# Patient Record
Sex: MALE | Race: WHITE | NOT HISPANIC OR LATINO | Employment: OTHER | URBAN - METROPOLITAN AREA
[De-identification: names, ages, dates, MRNs, and addresses within clinical notes are randomized per-mention and may not be internally consistent; named-entity substitution may affect disease eponyms.]

---

## 2017-02-03 ENCOUNTER — OFFICE VISIT (OUTPATIENT)
Dept: FAMILY MEDICINE | Facility: CLINIC | Age: 67
End: 2017-02-03
Payer: COMMERCIAL

## 2017-02-03 VITALS
BODY MASS INDEX: 35.81 KG/M2 | DIASTOLIC BLOOD PRESSURE: 72 MMHG | SYSTOLIC BLOOD PRESSURE: 130 MMHG | WEIGHT: 194.6 LBS | HEIGHT: 62 IN | TEMPERATURE: 97.1 F | HEART RATE: 72 BPM

## 2017-02-03 DIAGNOSIS — I10 BENIGN ESSENTIAL HYPERTENSION: Primary | ICD-10-CM

## 2017-02-03 DIAGNOSIS — R73.9 HYPERGLYCEMIA: ICD-10-CM

## 2017-02-03 DIAGNOSIS — R73.03 PRE-DIABETES: ICD-10-CM

## 2017-02-03 DIAGNOSIS — Z13.6 SCREENING FOR AAA (AORTIC ABDOMINAL ANEURYSM): ICD-10-CM

## 2017-02-03 LAB
ANION GAP SERPL CALCULATED.3IONS-SCNC: 5 MMOL/L (ref 3–14)
BUN SERPL-MCNC: 21 MG/DL (ref 7–30)
CALCIUM SERPL-MCNC: 9.5 MG/DL (ref 8.5–10.1)
CHLORIDE SERPL-SCNC: 102 MMOL/L (ref 94–109)
CO2 SERPL-SCNC: 30 MMOL/L (ref 20–32)
CREAT SERPL-MCNC: 0.94 MG/DL (ref 0.66–1.25)
GFR SERPL CREATININE-BSD FRML MDRD: 79 ML/MIN/1.7M2
GLUCOSE SERPL-MCNC: 130 MG/DL (ref 70–99)
HBA1C MFR BLD: 5.7 % (ref 4.3–6)
POTASSIUM SERPL-SCNC: 4.7 MMOL/L (ref 3.4–5.3)
SODIUM SERPL-SCNC: 137 MMOL/L (ref 133–144)

## 2017-02-03 PROCEDURE — 80048 BASIC METABOLIC PNL TOTAL CA: CPT | Performed by: FAMILY MEDICINE

## 2017-02-03 PROCEDURE — 36415 COLL VENOUS BLD VENIPUNCTURE: CPT | Performed by: FAMILY MEDICINE

## 2017-02-03 PROCEDURE — 99214 OFFICE O/P EST MOD 30 MIN: CPT | Performed by: FAMILY MEDICINE

## 2017-02-03 PROCEDURE — 83036 HEMOGLOBIN GLYCOSYLATED A1C: CPT | Performed by: FAMILY MEDICINE

## 2017-02-03 NOTE — PROGRESS NOTES
"  SUBJECTIVE:                                                    Bruno Baig is a 66 year old male who presents to clinic today for the following health issues:      Pre-Diabetes Follow-up      Patient is checking blood sugars: not at all    Diabetic concerns: None     Symptoms of hypoglycemia (low blood sugar): none     Paresthesias (numbness or burning in feet) or sores: No     Date of last diabetic eye exam: up to date     Hypertension Follow-up      Outpatient blood pressures are not being checked.    Low Salt Diet: no added salt  Patient presents for evaluation of hypertension.  He indicates that he is feeling well and denies any symptoms referable to his elevated blood pressure. Specifically denies chest pain, palpitations, dyspnea, orthopnea, PND or peripheral edema. Current medication regimen is as listed below. Patient denies any side effects of medication.         Amount of exercise or physical activity: None    Problems taking medications regularly: No    Medication side effects: none    Diet: low salt        Problem list and histories reviewed & adjusted, as indicated.  Additional history: as documented    Problem list, Medication list, Allergies, and Medical/Social/Surgical histories reviewed in EPIC and updated as appropriate.    ROS:  Constitutional, HEENT, cardiovascular, pulmonary, gi and gu systems are negative, except as otherwise noted.    OBJECTIVE:                                                    /72 mmHg  Pulse 72  Temp(Src) 97.1  F (36.2  C) (Tympanic)  Ht 5' 2\" (1.575 m)  Wt 194 lb 9.6 oz (88.27 kg)  BMI 35.58 kg/m2  Body mass index is 35.58 kg/(m^2).  GENERAL APPEARANCE: healthy, alert and no distress  RESP: lungs clear to auscultation - no rales, rhonchi or wheezes  CV: regular rates and rhythm, normal S1 S2, no S3 or S4 and no murmur, click or rub  SKIN: no suspicious lesions or rashes  PSYCH: mentation appears normal and affect normal/bright         ASSESSMENT/PLAN:      "                                               1. Benign essential hypertension  Stable no change in treatment plan.   - Basic metabolic panel    2. Pre-diabetes    3. Hyperglycemia  Has not really made any meaningful lifestyle changes   - Hemoglobin A1c    4. Screening for AAA (aortic abdominal aneurysm)    - US abdominal aorta limited; Future      Patient Instructions   Stay on all medications as you are taking them     Check Blood sugar 1-2 times per week 2 hours after your biggest meal <140 is goal     Labs today     Recheck in 6 months       Risks, benefits, side effects and rationale for treatment plan fully discussed with the patient and understanding expressed.     Charisse Lopez MD  [facility]

## 2017-02-03 NOTE — MR AVS SNAPSHOT
After Visit Summary   2/3/2017    Bruno Baig    MRN: [id number]           Patient Information     Date Of Birth          1950        Visit Information        Provider Department      2/3/2017 10:40 AM Charisse Lopez MD [facility]        Today's Diagnoses     Benign essential hypertension    -  1     Pre-diabetes         Hyperglycemia         Screening for AAA (aortic abdominal aneurysm)           Care Instructions    Stay on all medications as you are taking them     Check Blood sugar 1-2 times per week 2 hours after your biggest meal <140 is goal     Labs today     Recheck in 6 months         Follow-ups after your visit        Your next 10 appointments already scheduled     Feb 03, 2017 10:40 AM   Office Visit with Charisse Lopez MD   [facility]    [address] [place] [address] [phone]           Bring a current list of meds and any records pertaining to this visit.  For Physicals, please bring immunization records and any forms needing to be filled out.  Please arrive 10 minutes early to complete paperwork.              Future tests that were ordered for you today     Open Future Orders        Priority Expected Expires Ordered    US abdominal aorta limited Routine  2/3/2018 2/3/2017            Who to contact     If you have questions or need follow up information about today's clinic visit or your schedule please contact [facility] directly at [phone].  Normal or non-critical lab and imaging results will be communicated to you by MyChart, letter or phone within 4 business days after the clinic has received the results. If you do not hear from us within 7 days, please contact the clinic through MyChart or phone. If you have a critical or abnormal lab result, we will notify you by phone as soon as possible.  Submit refill requests through [employer] or call your  "pharmacy and they will forward the refill request to us. Please allow 3 business days for your refill to be completed.          Additional Information About Your Visit        MyChart Information     [employer] lets you send messages to your doctor, view your test results, renew your prescriptions, schedule appointments and more. To sign up, go to [email or web address] . Click on \"Log in\" on the left side of the screen, which will take you to the Welcome page. Then click on \"Sign up Now\" on the right side of the page.     You will be asked to enter the access code listed below, as well as some personal information. Please follow the directions to create your username and password.     Your access code is: [email or web address]  Expires: 2017 10:35 AM     Your access code will  in 90 days. If you need help or a new code, please call your [facility] clinic or [phone].        Care EveryWhere ID     This is your [facility] EveryWhere ID. This could be used by other organizations to access your [facility] medical records  [id number]        Your Vitals Were     Pulse Temperature Height BMI (Body Mass Index)          72 97.1  F (36.2  C) (Tympanic) 5' 2\" (1.575 m) 35.58 kg/m2         Blood Pressure from Last 3 Encounters:   17 130/72   10/18/16 138/60   10/04/16 142/60    Weight from Last 3 Encounters:   17 194 lb 9.6 oz (88.27 kg)   10/18/16 194 lb 9.6 oz (88.27 kg)   10/04/16 193 lb 3.2 oz (87.635 kg)              We Performed the Following     Basic metabolic panel     Hemoglobin A1c        Primary Care Provider Office Phone # Fax #    Charisse Lopez [phone]       [facility] [address] [address]        Thank you!     Thank you for choosing [facility]  for your care. Our goal is always to provide you with excellent care. Hearing back from our patients is one way we can continue to improve our services. Please take a few minutes to " complete the written survey that you may receive in the mail after your visit with us. Thank you!             Your Updated Medication List - Protect others around you: Learn how to safely use, store and throw away your medicines at www.disposemymeds.org.          This list is accurate as of: 2/3/17 10:35 AM.  Always use your most recent med list.                   Brand Name Dispense Instructions for use    aspirin 81 MG tablet      Take by mouth daily       blood glucose monitoring meter device kit    no brand specified    1 kit    1 kit by In Vitro route 2 times daily Use to test blood sugar 2 times daily or as directed.       blood glucose monitoring test strip    no brand specified    1 Box    Use to test blood sugars 2 times daily or as directed       fish oil-omega-3 fatty acids 1000 MG capsule      Take 2 g by mouth daily       glucosamine-chondroitin 500-400 MG Caps per capsule      Take 2 capsules by mouth daily       losartan-hydrochlorothiazide 100-12.5 MG per tablet    HYZAAR    90 tablet    Take 1 tablet by mouth daily       Vitamin D-3 1000 UNITS Caps

## 2017-02-03 NOTE — PATIENT INSTRUCTIONS
Stay on all medications as you are taking them     Check Blood sugar 1-2 times per week 2 hours after your biggest meal <140 is goal     Labs today     Recheck in 6 months

## 2017-02-03 NOTE — NURSING NOTE
"Chief Complaint   Patient presents with     Diabetes     pre diabetes       Initial /78 mmHg  Pulse 72  Temp(Src) 97.1  F (36.2  C) (Tympanic)  Ht 5' 2\" (1.575 m)  Wt 194 lb 9.6 oz (88.27 kg)  BMI 35.58 kg/m2 Estimated body mass index is 35.58 kg/(m^2) as calculated from the following:    Height as of this encounter: 5' 2\" (1.575 m).    Weight as of this encounter: 194 lb 9.6 oz (88.27 kg).  BP completed using cuff size: large    "

## 2017-02-27 ENCOUNTER — HOSPITAL ENCOUNTER (OUTPATIENT)
Dept: ULTRASOUND IMAGING | Facility: CLINIC | Age: 67
Discharge: HOME | End: 2017-02-27
Attending: FAMILY MEDICINE | Admitting: FAMILY MEDICINE
Payer: MEDICARE

## 2017-02-27 DIAGNOSIS — Z13.6 SCREENING FOR AAA (AORTIC ABDOMINAL ANEURYSM): ICD-10-CM

## 2017-02-27 PROCEDURE — 76775 US EXAM ABDO BACK WALL LIM: CPT

## 2017-03-10 PROBLEM — I71.40 ABDOMINAL AORTIC ANEURYSM (AAA) WITHOUT RUPTURE (H): Status: ACTIVE | Noted: 2017-03-10

## 2017-07-18 ENCOUNTER — TELEPHONE (OUTPATIENT)
Dept: FAMILY MEDICINE | Facility: CLINIC | Age: 67
End: 2017-07-18

## 2017-07-24 ENCOUNTER — OFFICE VISIT (OUTPATIENT)
Dept: FAMILY MEDICINE | Facility: CLINIC | Age: 67
End: 2017-07-24
Payer: COMMERCIAL

## 2017-07-24 VITALS
SYSTOLIC BLOOD PRESSURE: 130 MMHG | HEIGHT: 62 IN | TEMPERATURE: 97.4 F | WEIGHT: 190 LBS | BODY MASS INDEX: 34.96 KG/M2 | DIASTOLIC BLOOD PRESSURE: 70 MMHG | HEART RATE: 72 BPM

## 2017-07-24 DIAGNOSIS — E78.5 HYPERLIPIDEMIA LDL GOAL <100: ICD-10-CM

## 2017-07-24 DIAGNOSIS — I10 BENIGN ESSENTIAL HYPERTENSION: Primary | ICD-10-CM

## 2017-07-24 DIAGNOSIS — Z71.89 ADVANCED DIRECTIVES, COUNSELING/DISCUSSION: ICD-10-CM

## 2017-07-24 DIAGNOSIS — I71.40 ABDOMINAL AORTIC ANEURYSM (AAA) WITHOUT RUPTURE (H): ICD-10-CM

## 2017-07-24 DIAGNOSIS — Z12.11 SPECIAL SCREENING FOR MALIGNANT NEOPLASMS, COLON: ICD-10-CM

## 2017-07-24 DIAGNOSIS — R73.03 PRE-DIABETES: ICD-10-CM

## 2017-07-24 DIAGNOSIS — Z72.0 TOBACCO ABUSE: ICD-10-CM

## 2017-07-24 PROBLEM — Z28.21 REFUSES TETANUS, DIPHTHERIA, AND ACELLULAR PERTUSSIS (TDAP) VACCINATION: Status: ACTIVE | Noted: 2017-07-24

## 2017-07-24 PROBLEM — Z28.21 PNEUMOCOCCAL VACCINATION DECLINED BY PATIENT: Status: ACTIVE | Noted: 2017-07-24

## 2017-07-24 LAB
ALBUMIN SERPL-MCNC: 3.9 G/DL (ref 3.4–5)
ALP SERPL-CCNC: 61 U/L (ref 40–150)
ALT SERPL W P-5'-P-CCNC: 29 U/L (ref 0–70)
ANION GAP SERPL CALCULATED.3IONS-SCNC: 11 MMOL/L (ref 3–14)
AST SERPL W P-5'-P-CCNC: 31 U/L (ref 0–45)
BASOPHILS # BLD AUTO: 0 10E9/L (ref 0–0.2)
BASOPHILS NFR BLD AUTO: 0.4 %
BILIRUB DIRECT SERPL-MCNC: <0.1 MG/DL (ref 0–0.2)
BILIRUB SERPL-MCNC: 0.2 MG/DL (ref 0.2–1.3)
BUN SERPL-MCNC: 25 MG/DL (ref 7–30)
CALCIUM SERPL-MCNC: 9.6 MG/DL (ref 8.5–10.1)
CHLORIDE SERPL-SCNC: 104 MMOL/L (ref 94–109)
CHOLEST SERPL-MCNC: 225 MG/DL
CO2 SERPL-SCNC: 23 MMOL/L (ref 20–32)
CREAT SERPL-MCNC: 1.14 MG/DL (ref 0.66–1.25)
DIFFERENTIAL METHOD BLD: ABNORMAL
EOSINOPHIL # BLD AUTO: 0.3 10E9/L (ref 0–0.7)
EOSINOPHIL NFR BLD AUTO: 4.8 %
ERYTHROCYTE [DISTWIDTH] IN BLOOD BY AUTOMATED COUNT: 11.7 % (ref 10–15)
GFR SERPL CREATININE-BSD FRML MDRD: 64 ML/MIN/1.7M2
GLUCOSE SERPL-MCNC: 141 MG/DL (ref 70–99)
HBA1C MFR BLD: 5.6 % (ref 4.3–6)
HCT VFR BLD AUTO: 41.6 % (ref 40–53)
HDLC SERPL-MCNC: 28 MG/DL
HGB BLD-MCNC: 14.7 G/DL (ref 13.3–17.7)
LDLC SERPL CALC-MCNC: ABNORMAL MG/DL
LDLC SERPL DIRECT ASSAY-MCNC: 79 MG/DL
LYMPHOCYTES # BLD AUTO: 2 10E9/L (ref 0.8–5.3)
LYMPHOCYTES NFR BLD AUTO: 29.9 %
MCH RBC QN AUTO: 33.4 PG (ref 26.5–33)
MCHC RBC AUTO-ENTMCNC: 35.3 G/DL (ref 31.5–36.5)
MCV RBC AUTO: 95 FL (ref 78–100)
MONOCYTES # BLD AUTO: 0.8 10E9/L (ref 0–1.3)
MONOCYTES NFR BLD AUTO: 11.7 %
NEUTROPHILS # BLD AUTO: 3.6 10E9/L (ref 1.6–8.3)
NEUTROPHILS NFR BLD AUTO: 53.2 %
NONHDLC SERPL-MCNC: 197 MG/DL
PLATELET # BLD AUTO: 242 10E9/L (ref 150–450)
POTASSIUM SERPL-SCNC: 4.6 MMOL/L (ref 3.4–5.3)
PROT SERPL-MCNC: 7.8 G/DL (ref 6.8–8.8)
RBC # BLD AUTO: 4.4 10E12/L (ref 4.4–5.9)
SODIUM SERPL-SCNC: 138 MMOL/L (ref 133–144)
TRIGL SERPL-MCNC: 1404 MG/DL
TSH SERPL DL<=0.005 MIU/L-ACNC: 1.66 MU/L (ref 0.4–4)
WBC # BLD AUTO: 6.7 10E9/L (ref 4–11)

## 2017-07-24 PROCEDURE — 99214 OFFICE O/P EST MOD 30 MIN: CPT | Performed by: FAMILY MEDICINE

## 2017-07-24 PROCEDURE — 83036 HEMOGLOBIN GLYCOSYLATED A1C: CPT | Performed by: FAMILY MEDICINE

## 2017-07-24 PROCEDURE — 83721 ASSAY OF BLOOD LIPOPROTEIN: CPT | Mod: 59 | Performed by: FAMILY MEDICINE

## 2017-07-24 PROCEDURE — 80076 HEPATIC FUNCTION PANEL: CPT | Performed by: FAMILY MEDICINE

## 2017-07-24 PROCEDURE — 80061 LIPID PANEL: CPT | Performed by: FAMILY MEDICINE

## 2017-07-24 PROCEDURE — 84443 ASSAY THYROID STIM HORMONE: CPT | Performed by: FAMILY MEDICINE

## 2017-07-24 PROCEDURE — 85025 COMPLETE CBC W/AUTO DIFF WBC: CPT | Performed by: FAMILY MEDICINE

## 2017-07-24 PROCEDURE — 36415 COLL VENOUS BLD VENIPUNCTURE: CPT | Performed by: FAMILY MEDICINE

## 2017-07-24 PROCEDURE — 80048 BASIC METABOLIC PNL TOTAL CA: CPT | Performed by: FAMILY MEDICINE

## 2017-07-24 NOTE — PROGRESS NOTES
SUBJECTIVE:                                                    Bruno Baig is a 66 year old male who presents to clinic today for the following health issues:    Pre -Diabetes Follow-up      Patient is checking blood sugars: once a week, ranges from 125-155    Diabetic concerns: None     Symptoms of hypoglycemia (low blood sugar): none     Paresthesias (numbness or burning in feet) or sores: No     Date of last diabetic eye exam: about one year ago    Hypertension Follow-up      Outpatient blood pressures are not being checked.    Low Salt Diet: not monitoring salt  Patient presents for evaluation of hypertension.  He indicates that he is feeling well and denies any symptoms referable to his elevated blood pressure. Specifically denies chest pain, palpitations, dyspnea, orthopnea, PND or peripheral edema. Current medication regimen is as listed below. Patient denies any side effects of medication.              Amount of exercise or physical activity: a couple days a week- yardwork, stretching exercises    Problems taking medications regularly: No    Medication side effects: none  Diet: regular (no restrictions)      Hyperlipidemia Follow-Up      Rate your low fat/cholesterol diet?: good    Taking statin?  Yes, no muscle aches from statin    Other lipid medications/supplements?:  none    Tobacco abuse  Not ready/willing to quit has in the past and just is not wanting to now    AAA on screening US this winter plan to repeat in Sept to assess         Problem list and histories reviewed & adjusted, as indicated.  Additional history: as documented    Labs reviewed in EPIC    Reviewed and updated as needed this visit by clinical staffTobacco  Allergies  Meds  Problems  Med Hx  Surg Hx  Fam Hx  Soc Hx        Reviewed and updated as needed this visit by Provider  Allergies  Meds  Problems         ROS:  Constitutional, HEENT, cardiovascular, pulmonary, gi and gu systems are negative, except as otherwise  "noted.      OBJECTIVE:                                                    /70  Pulse 72  Temp 97.4  F (36.3  C) (Tympanic)  Ht 5' 2\" (1.575 m)  Wt 190 lb (86.2 kg)  BMI 34.75 kg/m2  Body mass index is 34.75 kg/(m^2).  GENERAL APPEARANCE: healthy, alert and no distress  NECK: no adenopathy, no asymmetry, masses, or scars and thyroid normal to palpation  RESP: lungs clear to auscultation - no rales, rhonchi or wheezes  CV: regular rates and rhythm, normal S1 S2, no S3 or S4 and no murmur, click or rub  MS: extremities normal- no gross deformities noted  PSYCH: mentation appears normal and affect normal/bright         ASSESSMENT/PLAN:                                                    1. Benign essential hypertension  Stable no change in treatment plan.   - Lipid panel reflex to direct LDL  - Basic metabolic panel  - CBC with platelets differential  - Hepatic panel  - TSH with free T4 reflex    2. Pre-diabetes    - Hemoglobin A1c    3. Abdominal aortic aneurysm (AAA) without rupture (H)  Will recheck in Sept   -  abdominal aorta limited; Future    4. Hyperlipidemia LDL goal <100  Adjust meds as indicated by above labs.     5. Special screening for malignant neoplasms, colon    - GASTROENTEROLOGY ADULT REF PROCEDURE ONLY    6. Advanced directives, counseling/discussion      7. Tobacco abuse  Not wanting to quit now offered help       Patient Instructions   Labs today     Set up US in September     Set up colonoscopy     See me in 6 months for recheck of BP     Continue to check BS a couple of times a week and if >170 let me know     Risks, benefits, side effects and rationale for treatment plan fully discussed with the patient and understanding expressed.   Charisse Lopez MD  [facility]  "

## 2017-07-24 NOTE — MR AVS SNAPSHOT
After Visit Summary   7/24/2017    Bruno Baig    MRN: [id number]           Patient Information     Date Of Birth          1950        Visit Information        Provider Department      7/24/2017 10:00 AM Charisse Lopez MD [facility]        Today's Diagnoses     Benign essential hypertension    -  1    Pre-diabetes        Abdominal aortic aneurysm (AAA) without rupture (H)        Hyperlipidemia LDL goal <100        Special screening for malignant neoplasms, colon        Advanced directives, counseling/discussion          Care Instructions    Labs today     Set up US in September     Set up colonoscopy     See me in 6 months for recheck of BP     Continue to check BS a couple of times a week and if >170 let me know           Follow-ups after your visit        Additional Services     GASTROENTEROLOGY ADULT REF PROCEDURE ONLY       Last Lab Result: Creatinine (mg/dL)       Date                     Value                 02/03/2017               0.94             ----------  Body mass index is 34.75 kg/(m^2).      Patient will be contacted to schedule procedure.     Please be aware that coverage of these services is subject to the terms and limitations of your health insurance plan.  Call member services at your health plan with any benefit or coverage questions.  Any procedures must be performed at a [facility] facility OR coordinated by your clinic's referral office.    Please bring the following with you to your appointment:    (1) Any X-Rays, CTs or MRIs which have been performed.  Contact the facility where they were done to arrange for  prior to your scheduled appointment.    (2) List of current medications   (3) This referral request   (4) Any documents/labs given to you for this referral                  Future tests that were ordered for you today     Open Future Orders        Priority Expected Expires Ordered    US abdominal aorta  "limited Routine  7/24/2018 7/24/2017            Who to contact     If you have questions or need follow up information about today's clinic visit or your schedule please contact [facility] directly at [phone].  Normal or non-critical lab and imaging results will be communicated to you by [employer], letter or phone within 4 business days after the clinic has received the results. If you do not hear from us within 7 days, please contact the clinic through [employer] or phone. If you have a critical or abnormal lab result, we will notify you by phone as soon as possible.  Submit refill requests through [employer] or call your pharmacy and they will forward the refill request to us. Please allow 3 business days for your refill to be completed.          Additional Information About Your Visit        [employer] Information     [employer] gives you secure access to your electronic health record. If you see a primary care provider, you can also send messages to your care team and make appointments. If you have questions, please call your primary care clinic.  If you do not have a primary care provider, please call [phone] and they will assist you.        Care EveryWhere ID     This is your Care EveryWhere ID. This could be used by other organizations to access your [facility] medical records  [id number]        Your Vitals Were     Pulse Temperature Height BMI (Body Mass Index)          72 97.4  F (36.3  C) (Tympanic) 5' 2\" (1.575 m) 34.75 kg/m2         Blood Pressure from Last 3 Encounters:   07/24/17 144/72   02/03/17 130/72   10/18/16 138/60    Weight from Last 3 Encounters:   07/24/17 190 lb (86.2 kg)   02/03/17 194 lb 9.6 oz (88.3 kg)   10/18/16 194 lb 9.6 oz (88.3 kg)              We Performed the Following     Basic metabolic panel     CBC with platelets differential     GASTROENTEROLOGY ADULT REF PROCEDURE ONLY     Hemoglobin A1c     Hepatic panel     Lipid panel reflex to direct LDL     TSH with free " T4 reflex        Primary Care Provider Office Phone # Fax #    Charisse Lopez [phone]       [facility] [address] [facility] [address]        Equal Access to Services     TIFFANI REED : Brayan aida atkinson diane Soshey, waaxda luqadaha, qaybta kaalmada adeegyada, handy lopez laDimaaleena gallardo. So [facility] [phone].    ATENCIÓN: Si habla español, tiene a mcintyre disposición servicios gratuitos de asistencia lingüística. Llame al [phone].    We comply with applicable federal civil rights laws and [place] laws. We do not discriminate on the basis of race, color, national origin, age, disability sex, sexual orientation or gender identity.            Thank you!     Thank you for choosing [facility]  for your care. Our goal is always to provide you with excellent care. Hearing back from our patients is one way we can continue to improve our services. Please take a few minutes to complete the written survey that you may receive in the mail after your visit with us. Thank you!             Your Updated Medication List - Protect others around you: Learn how to safely use, store and throw away your medicines at www.disposemymeds.org.          This list is accurate as of: 7/24/17 10:26 AM.  Always use your most recent med list.                   Brand Name Dispense Instructions for use Diagnosis    aspirin 81 MG tablet      Take by mouth daily        blood glucose monitoring meter device kit    no brand specified    1 kit    1 kit by In Vitro route 2 times daily Use to test blood sugar 2 times daily or as directed.    Prediabetes       blood glucose monitoring test strip    no brand specified    1 Box    Use to test blood sugars 2 times daily or as directed    Prediabetes       fish oil-omega-3 fatty acids 1000 MG capsule      Take 2 g by mouth daily        glucosamine-chondroitin 500-400 MG Caps per capsule      Take 2 capsules by mouth daily         losartan-hydrochlorothiazide 100-12.5 MG per tablet    HYZAAR    90 tablet    Take 1 tablet by mouth daily    Benign essential hypertension       Vitamin D-3 1000 UNITS Caps

## 2017-07-24 NOTE — NURSING NOTE
"Chief Complaint   Patient presents with     Chronic Disease Management       Initial /72 (BP Location: Right arm, Cuff Size: Adult Large)  Pulse 72  Temp 97.4  F (36.3  C) (Tympanic)  Ht 5' 2\" (1.575 m)  Wt 190 lb (86.2 kg)  BMI 34.75 kg/m2 Estimated body mass index is 34.75 kg/(m^2) as calculated from the following:    Height as of this encounter: 5' 2\" (1.575 m).    Weight as of this encounter: 190 lb (86.2 kg).  Medication Reconciliation: complete    Health Maintenance that is potentially due pending provider review:  Colon cancer screening, Tdap, pneumonia vaccines    Possibly completing today per provider review.    Is there anyone who you would like to be able to receive your results? No  If yes have patient fill out RADHA    Radha Corona MA     "

## 2017-07-24 NOTE — PATIENT INSTRUCTIONS
Labs today     Set up US in September     Set up colonoscopy     See me in 6 months for recheck of BP     Continue to check BS a couple of times a week and if >170 let me know

## 2017-07-27 ENCOUNTER — TELEPHONE (OUTPATIENT)
Dept: FAMILY MEDICINE | Facility: CLINIC | Age: 67
End: 2017-07-27

## 2017-07-27 DIAGNOSIS — E78.5 HYPERLIPIDEMIA LDL GOAL <100: Primary | ICD-10-CM

## 2017-07-27 NOTE — TELEPHONE ENCOUNTER
Please call pt and verify that this was fasting. His TG are stacey high and this is mainly reflective of diet and activity. High tg put him at significant risk of pancreatitis and DM worsening as well as stroke and heart attacks. He must cut down on alcohol consumption this will help.  If yes this was fasting then we need to start a statin med lipitor 20 mg daily and repeat FLP in 2 months. If no then we need to repeat flp.     Patient notified and states understands results and advice. Patient states he was not fasting.  Orders in epic.  Malgorzata Chamorro CMA

## 2017-08-01 DIAGNOSIS — E78.5 HYPERLIPIDEMIA LDL GOAL <100: ICD-10-CM

## 2017-08-01 LAB
CHOLEST SERPL-MCNC: 222 MG/DL
HDLC SERPL-MCNC: 33 MG/DL
LDLC SERPL CALC-MCNC: ABNORMAL MG/DL
LDLC SERPL DIRECT ASSAY-MCNC: 100 MG/DL
NONHDLC SERPL-MCNC: 189 MG/DL
TRIGL SERPL-MCNC: 823 MG/DL

## 2017-08-01 PROCEDURE — 83721 ASSAY OF BLOOD LIPOPROTEIN: CPT | Mod: 59 | Performed by: FAMILY MEDICINE

## 2017-08-01 PROCEDURE — 36415 COLL VENOUS BLD VENIPUNCTURE: CPT | Performed by: FAMILY MEDICINE

## 2017-08-01 PROCEDURE — 80061 LIPID PANEL: CPT | Performed by: FAMILY MEDICINE

## 2017-08-07 ENCOUNTER — TELEPHONE (OUTPATIENT)
Dept: FAMILY MEDICINE | Facility: CLINIC | Age: 67
End: 2017-08-07

## 2017-08-07 DIAGNOSIS — E78.5 HYPERLIPIDEMIA LDL GOAL <100: Primary | ICD-10-CM

## 2017-08-07 RX ORDER — GEMFIBROZIL 600 MG/1
600 TABLET, FILM COATED ORAL 2 TIMES DAILY
Qty: 60 TABLET | Refills: 1 | COMMUNITY
Start: 2017-08-07 | End: 2017-10-04

## 2017-08-07 NOTE — TELEPHONE ENCOUNTER
chol even fasting is elevated. Particularly his triglycerides. We need to start a medication for these we should start lopid 600 mg bid and repeat fasting chol panel in 2 months. The reason we need to lower these is he is at significant risk of pancreatitis if his TG remain this high. That is why I would also recommend consideration of rechecking with dietician to work on diet and cut out alcohol.    Patient notified and states understands results and advice. Orders in epic. Rx called to pharmacy  Malgorzata Chamorro CMA

## 2017-08-22 ENCOUNTER — DOCUMENTATION ONLY (OUTPATIENT)
Dept: LAB | Facility: CLINIC | Age: 67
End: 2017-08-22

## 2017-08-22 ENCOUNTER — TELEPHONE (OUTPATIENT)
Dept: FAMILY MEDICINE | Facility: CLINIC | Age: 67
End: 2017-08-22

## 2017-08-22 DIAGNOSIS — M10.9 GOUT: Primary | ICD-10-CM

## 2017-08-22 RX ORDER — PREDNISONE 20 MG/1
40 TABLET ORAL DAILY
Qty: 10 TABLET | Refills: 0 | Status: SHIPPED | OUTPATIENT
Start: 2017-08-22 | End: 2017-08-27

## 2017-08-22 NOTE — TELEPHONE ENCOUNTER
Reason for call:  Patient reporting a symptom    Symptom or request: Bruno says he has gout in his left great toe for about a week. He is asking to talk to Dr Lopez's nurse for advice. Please call    Duration (how long have symptoms been present): one week    Have you been treated for this before? Yes    Additional comments: none    Phone Number patient can be reached at:  Home number on file [phone] (home)    Best Time:  anytime    Can we leave a detailed message on this number:  YES    Call taken on 8/22/2017 at 9:20 AM by Lisa Wang

## 2017-08-22 NOTE — TELEPHONE ENCOUNTER
Has been having gout pain for about a week. He has had several episodes over the past year but it usually goes away within 3 days. This time pain is lingering and he is wondering what you would recommend, prednisone?

## 2017-08-22 NOTE — TELEPHONE ENCOUNTER
Lets get a prednisone burst and 40 mg daily for 5 days also would like him to come in and get a uric acid level

## 2017-08-23 DIAGNOSIS — M10.9 GOUT: ICD-10-CM

## 2017-08-23 LAB — URATE SERPL-MCNC: 7.7 MG/DL (ref 3.5–7.2)

## 2017-08-23 PROCEDURE — 36415 COLL VENOUS BLD VENIPUNCTURE: CPT | Performed by: FAMILY MEDICINE

## 2017-08-23 PROCEDURE — 84550 ASSAY OF BLOOD/URIC ACID: CPT | Performed by: FAMILY MEDICINE

## 2017-08-24 ENCOUNTER — TELEPHONE (OUTPATIENT)
Dept: FAMILY MEDICINE | Facility: CLINIC | Age: 67
End: 2017-08-24

## 2017-08-24 DIAGNOSIS — M10.9 ACUTE GOUT, UNSPECIFIED CAUSE, UNSPECIFIED SITE: Primary | ICD-10-CM

## 2017-08-24 RX ORDER — ALLOPURINOL 100 MG/1
100 TABLET ORAL DAILY
Qty: 30 TABLET | Refills: 1 | COMMUNITY
Start: 2017-08-24 | End: 2017-10-30

## 2017-08-24 NOTE — TELEPHONE ENCOUNTER
he is in the process of treating and acute gout flare with prednsione when the gout flare is treated so in 1-2 weeks and he has no more pain and no redness or swelling he needs to start allopurinol 100 mg 1 tab daina to lower he uric acid and prevent gout flares. After he starts this needs to check uric acid level in 4 weeks. Orders in epic.  Rx called to pharmacy.      Patient notified and states understands results and advice  Malgorzata Chamorro CMA

## 2017-09-11 ENCOUNTER — HOSPITAL ENCOUNTER (OUTPATIENT)
Dept: ULTRASOUND IMAGING | Facility: CLINIC | Age: 67
Discharge: HOME | End: 2017-09-11
Attending: FAMILY MEDICINE | Admitting: FAMILY MEDICINE
Payer: MEDICARE

## 2017-09-11 DIAGNOSIS — I71.40 ABDOMINAL AORTIC ANEURYSM (AAA) WITHOUT RUPTURE (H): ICD-10-CM

## 2017-09-11 PROCEDURE — 76775 US EXAM ABDO BACK WALL LIM: CPT

## 2017-10-02 DIAGNOSIS — M10.9 ACUTE GOUT, UNSPECIFIED CAUSE, UNSPECIFIED SITE: ICD-10-CM

## 2017-10-02 DIAGNOSIS — E78.5 HYPERLIPIDEMIA LDL GOAL <100: ICD-10-CM

## 2017-10-02 LAB
CHOLEST SERPL-MCNC: 236 MG/DL
HDLC SERPL-MCNC: 41 MG/DL
LDLC SERPL CALC-MCNC: ABNORMAL MG/DL
LDLC SERPL DIRECT ASSAY-MCNC: 133 MG/DL
NONHDLC SERPL-MCNC: 195 MG/DL
TRIGL SERPL-MCNC: 485 MG/DL
URATE SERPL-MCNC: 6.1 MG/DL (ref 3.5–7.2)

## 2017-10-02 PROCEDURE — 83721 ASSAY OF BLOOD LIPOPROTEIN: CPT | Mod: 59 | Performed by: FAMILY MEDICINE

## 2017-10-02 PROCEDURE — 36415 COLL VENOUS BLD VENIPUNCTURE: CPT | Performed by: FAMILY MEDICINE

## 2017-10-02 PROCEDURE — 80061 LIPID PANEL: CPT | Performed by: FAMILY MEDICINE

## 2017-10-02 PROCEDURE — 84550 ASSAY OF BLOOD/URIC ACID: CPT | Performed by: FAMILY MEDICINE

## 2017-10-04 ENCOUNTER — TELEPHONE (OUTPATIENT)
Dept: FAMILY MEDICINE | Facility: CLINIC | Age: 67
End: 2017-10-04

## 2017-10-04 DIAGNOSIS — M10.9 ACUTE GOUT, UNSPECIFIED CAUSE, UNSPECIFIED SITE: ICD-10-CM

## 2017-10-04 DIAGNOSIS — I10 BENIGN ESSENTIAL HYPERTENSION: ICD-10-CM

## 2017-10-04 DIAGNOSIS — E78.5 HYPERLIPIDEMIA LDL GOAL <100: Primary | ICD-10-CM

## 2017-10-04 NOTE — TELEPHONE ENCOUNTER
Gemfibozil 600 mg Tablet    Last Written Prescription Date: 08/17/2017  Last Fill Quantity:60 , # refills:1   Last Office Visit with G, P or [facility] prescribing provider:  07/24/21017  Future Office Visit:      Cholesterol   Date Value Ref Range Status   10/02/2017 236 (H) <200 mg/dL Final     Comment:     Desirable:       <200 mg/dl     HDL Cholesterol   Date Value Ref Range Status   10/02/2017 41 >39 mg/dL Final     LDL Cholesterol Calculated   Date Value Ref Range Status   10/02/2017  <100 mg/dL Final    Cannot estimate LDL when triglyceride exceeds 400 mg/dL     LDL Cholesterol Direct   Date Value Ref Range Status   10/02/2017 133 (H) <100 mg/dL Final     Comment:     Above desirable:  100-129 mg/dl  Borderline High:  130-159 mg/dL  High:             160-189 mg/dL  Very high:       >189 mg/dl       Triglycerides   Date Value Ref Range Status   10/02/2017 485 (H) <150 mg/dL Final     Comment:     Borderline high:  150-199 mg/dl  High:             200-499 mg/dl  Very high:       >499 mg/dl       ALT   Date Value Ref Range Status   07/24/2017 29 0 - 70 U/L Final

## 2017-10-04 NOTE — TELEPHONE ENCOUNTER
Patient notified of lab results.  Please put in future orders:    Notes Recorded by Charisse Lopez MD on 10/3/2017 at 1:47 PM  Please call anjali. Labs much better on meds. I would not change anything now other than continue to work on diet and more activity and repeat flp and uric acid in 3 months just to be sure this is continuing to go down

## 2017-10-05 DIAGNOSIS — I10 BENIGN ESSENTIAL HYPERTENSION: ICD-10-CM

## 2017-10-05 RX ORDER — GEMFIBROZIL 600 MG/1
600 TABLET, FILM COATED ORAL 2 TIMES DAILY
Qty: 60 TABLET | Refills: 3 | Status: SHIPPED | OUTPATIENT
Start: 2017-10-05 | End: 2017-10-05

## 2017-10-05 RX ORDER — LOSARTAN POTASSIUM AND HYDROCHLOROTHIAZIDE 12.5; 1 MG/1; MG/1
1 TABLET ORAL DAILY
Qty: 90 TABLET | Refills: 3 | Status: SHIPPED | OUTPATIENT
Start: 2017-10-05 | End: 2018-06-18

## 2017-10-05 RX ORDER — GEMFIBROZIL 600 MG/1
600 TABLET, FILM COATED ORAL 2 TIMES DAILY
Qty: 60 TABLET | Refills: 0 | Status: SHIPPED | OUTPATIENT
Start: 2017-10-05 | End: 2018-02-11

## 2017-10-05 NOTE — TELEPHONE ENCOUNTER
Losartan-HCTZ 100-12.5      Last Written Prescription Date: 10/4/16  Last Fill Quantity: 90, # refills: 3  Last Office Visit with [facility] [place] [place], [facility] or [facility] prescribing provider: 7/24/17       Potassium   Date Value Ref Range Status   07/24/2017 4.6 3.4 - 5.3 mmol/L Final     Creatinine   Date Value Ref Range Status   07/24/2017 1.14 0.66 - 1.25 mg/dL Final     BP Readings from Last 3 Encounters:   07/24/17 130/70   02/03/17 130/72   10/18/16 138/60

## 2017-10-05 NOTE — TELEPHONE ENCOUNTER
Gemfibrozil 600 mg tab       Last Written Prescription Date: 10/5/17  Last Fill Quantity: 60, # refills: 3    Last Office Visit with [facility], P or [facility] prescribing provider:  7/24/17   Future Office Visit:      Cholesterol   Date Value Ref Range Status   10/02/2017 236 (H) <200 mg/dL Final     Comment:     Desirable:       <200 mg/dl     HDL Cholesterol   Date Value Ref Range Status   10/02/2017 41 >39 mg/dL Final     LDL Cholesterol Calculated   Date Value Ref Range Status   10/02/2017  <100 mg/dL Final    Cannot estimate LDL when triglyceride exceeds 400 mg/dL     LDL Cholesterol Direct   Date Value Ref Range Status   10/02/2017 133 (H) <100 mg/dL Final     Comment:     Above desirable:  100-129 mg/dl  Borderline High:  130-159 mg/dL  High:             160-189 mg/dL  Very high:       >189 mg/dl       Triglycerides   Date Value Ref Range Status   10/02/2017 485 (H) <150 mg/dL Final     Comment:     Borderline high:  150-199 mg/dl  High:             200-499 mg/dl  Very high:       >499 mg/dl       ALT   Date Value Ref Range Status   07/24/2017 29 0 - 70 U/L Final

## 2017-10-30 ENCOUNTER — TELEPHONE (OUTPATIENT)
Dept: FAMILY MEDICINE | Facility: CLINIC | Age: 67
End: 2017-10-30

## 2017-10-30 DIAGNOSIS — M10.9 ACUTE GOUT, UNSPECIFIED CAUSE, UNSPECIFIED SITE: Primary | ICD-10-CM

## 2017-10-30 RX ORDER — ALLOPURINOL 100 MG/1
100 TABLET ORAL DAILY
Qty: 30 TABLET | Refills: 1 | Status: SHIPPED | OUTPATIENT
Start: 2017-10-30 | End: 2017-12-11

## 2017-10-30 NOTE — TELEPHONE ENCOUNTER
Yes he should stay on this and we should check uric acid level but he should stay on this indefinately for prevention of gout attacks

## 2017-10-30 NOTE — TELEPHONE ENCOUNTER
Reason for call:  Patient reporting a symptom    Symptom or request: Gout- Pt was given Allopurinol for his Gout x 1 month. Pt completed medication and should he stay on this medication     Duration (how long have symptoms been present): See OV notes    Have you been treated for this before? No      Phone Number patient can be reached at:  Home number on file [phone] (home)    Best Time:  Any Time      Can we leave a detailed message on this number:  YES    Call taken on 10/30/2017 at 10:15 AM by Octavia López

## 2017-11-08 ENCOUNTER — ANESTHESIA EVENT (OUTPATIENT)
Dept: GASTROENTEROLOGY | Facility: CLINIC | Age: 67
End: 2017-11-08
Payer: MEDICARE

## 2017-11-08 ASSESSMENT — LIFESTYLE VARIABLES: TOBACCO_USE: 1

## 2017-11-08 NOTE — ANESTHESIA PREPROCEDURE EVALUATION
Anesthesia Evaluation     . Pt has not had prior anesthetic            ROS/MED HX    ENT/Pulmonary:     (+)tobacco use, Current use .5 packs/day  , . .    Neurologic:  - neg neurologic ROS     Cardiovascular:     (+) Dyslipidemia, hypertension----. : . . . :. .       METS/Exercise Tolerance:     Hematologic:  - neg hematologic  ROS       Musculoskeletal:   (+) , , other musculoskeletal-       GI/Hepatic:  - neg GI/hepatic ROS       Renal/Genitourinary:  - ROS Renal section negative       Endo:     (+) Other Endocrine Disorder prediabetes.      Psychiatric:  - neg psychiatric ROS       Infectious Disease:  - neg infectious disease ROS       Malignancy:      - no malignancy   Other:    - neg other ROS                 Physical Exam  Normal systems: cardiovascular and pulmonary    Airway   Mallampati: II  TM distance: >3 FB  Neck ROM: full    Dental   (+) upper dentures    Cardiovascular       Pulmonary                     Anesthesia Plan      History & Physical Review  History and physical reviewed and following examination; no interval change.    ASA Status:  3 .    NPO Status:  > 4 hours    Plan for MAC Reason for MAC:  Deep or markedly invasive procedure (G8)         Postoperative Care      Consents  Anesthetic plan, risks, benefits and alternatives discussed with:  Patient..                          .

## 2017-11-09 ENCOUNTER — HOSPITAL ENCOUNTER (OUTPATIENT)
Facility: CLINIC | Age: 67
Discharge: HOME | End: 2017-11-09
Attending: SURGERY | Admitting: SURGERY
Payer: MEDICARE

## 2017-11-09 ENCOUNTER — ANESTHESIA (OUTPATIENT)
Dept: GASTROENTEROLOGY | Facility: CLINIC | Age: 67
End: 2017-11-09
Payer: MEDICARE

## 2017-11-09 ENCOUNTER — SURGERY (OUTPATIENT)
Age: 67
End: 2017-11-09

## 2017-11-09 VITALS
HEART RATE: 86 BPM | HEIGHT: 68 IN | DIASTOLIC BLOOD PRESSURE: 68 MMHG | RESPIRATION RATE: 14 BRPM | WEIGHT: 180 LBS | TEMPERATURE: 98.3 F | BODY MASS INDEX: 27.28 KG/M2 | SYSTOLIC BLOOD PRESSURE: 131 MMHG | OXYGEN SATURATION: 94 %

## 2017-11-09 LAB
COLONOSCOPY: NORMAL
GLUCOSE BLDC GLUCOMTR-MCNC: 121 MG/DL (ref 70–99)

## 2017-11-09 PROCEDURE — G0121 COLON CA SCRN NOT HI RSK IND: HCPCS | Performed by: SURGERY

## 2017-11-09 PROCEDURE — 82962 GLUCOSE BLOOD TEST: CPT

## 2017-11-09 PROCEDURE — 37000008 ZZH ANESTHESIA TECHNICAL FEE, 1ST 30 MIN: Performed by: SURGERY

## 2017-11-09 PROCEDURE — 45378 DIAGNOSTIC COLONOSCOPY: CPT | Performed by: SURGERY

## 2017-11-09 PROCEDURE — 25000128 H RX IP 250 OP 636: Performed by: NURSE ANESTHETIST, CERTIFIED REGISTERED

## 2017-11-09 PROCEDURE — 25000125 ZZHC RX 250: Performed by: NURSE ANESTHETIST, CERTIFIED REGISTERED

## 2017-11-09 PROCEDURE — 25000125 ZZHC RX 250: Performed by: SURGERY

## 2017-11-09 PROCEDURE — 25000128 H RX IP 250 OP 636: Performed by: SURGERY

## 2017-11-09 RX ORDER — PROPOFOL 10 MG/ML
INJECTION, EMULSION INTRAVENOUS PRN
Status: DISCONTINUED | OUTPATIENT
Start: 2017-11-09 | End: 2017-11-09

## 2017-11-09 RX ORDER — SODIUM CHLORIDE, SODIUM LACTATE, POTASSIUM CHLORIDE, CALCIUM CHLORIDE 600; 310; 30; 20 MG/100ML; MG/100ML; MG/100ML; MG/100ML
INJECTION, SOLUTION INTRAVENOUS CONTINUOUS
Status: DISCONTINUED | OUTPATIENT
Start: 2017-11-09 | End: 2017-11-09 | Stop reason: HOSPADM

## 2017-11-09 RX ORDER — LIDOCAINE HYDROCHLORIDE 10 MG/ML
INJECTION, SOLUTION INFILTRATION; PERINEURAL PRN
Status: DISCONTINUED | OUTPATIENT
Start: 2017-11-09 | End: 2017-11-09

## 2017-11-09 RX ORDER — GLYCOPYRROLATE 0.2 MG/ML
INJECTION, SOLUTION INTRAMUSCULAR; INTRAVENOUS PRN
Status: DISCONTINUED | OUTPATIENT
Start: 2017-11-09 | End: 2017-11-09

## 2017-11-09 RX ORDER — LIDOCAINE 40 MG/G
CREAM TOPICAL
Status: DISCONTINUED | OUTPATIENT
Start: 2017-11-09 | End: 2017-11-09 | Stop reason: HOSPADM

## 2017-11-09 RX ORDER — PROPOFOL 10 MG/ML
INJECTION, EMULSION INTRAVENOUS CONTINUOUS PRN
Status: DISCONTINUED | OUTPATIENT
Start: 2017-11-09 | End: 2017-11-09

## 2017-11-09 RX ORDER — ONDANSETRON 2 MG/ML
4 INJECTION INTRAMUSCULAR; INTRAVENOUS
Status: DISCONTINUED | OUTPATIENT
Start: 2017-11-09 | End: 2017-11-09 | Stop reason: HOSPADM

## 2017-11-09 RX ADMIN — PROPOFOL 50 MG: 10 INJECTION, EMULSION INTRAVENOUS at 10:39

## 2017-11-09 RX ADMIN — PROPOFOL 100 MG: 10 INJECTION, EMULSION INTRAVENOUS at 10:34

## 2017-11-09 RX ADMIN — GLYCOPYRROLATE 0.2 MG: 0.2 INJECTION, SOLUTION INTRAMUSCULAR; INTRAVENOUS at 10:32

## 2017-11-09 RX ADMIN — SODIUM CHLORIDE, POTASSIUM CHLORIDE, SODIUM LACTATE AND CALCIUM CHLORIDE: 600; 310; 30; 20 INJECTION, SOLUTION INTRAVENOUS at 10:06

## 2017-11-09 RX ADMIN — LIDOCAINE HYDROCHLORIDE 1 ML: 10 INJECTION, SOLUTION EPIDURAL; INFILTRATION; INTRACAUDAL; PERINEURAL at 10:07

## 2017-11-09 RX ADMIN — LIDOCAINE HYDROCHLORIDE 50 MG: 10 INJECTION, SOLUTION INFILTRATION; PERINEURAL at 10:34

## 2017-11-09 RX ADMIN — PROPOFOL 150 MCG/KG/MIN: 10 INJECTION, EMULSION INTRAVENOUS at 10:35

## 2017-11-09 NOTE — H&P
"66 year old year old male here for colonoscopy for screening.    Patient Active Problem List   Diagnosis     Acute gout, unspecified cause, unspecified site     Benign essential hypertension     Pre-diabetes     Abdominal aortic aneurysm (AAA) without rupture (H)     Advanced directives, counseling/discussion     Hyperlipidemia LDL goal <100     Tobacco abuse     Refuses tetanus, diphtheria, and acellular pertussis (TDaP) vaccination     Pneumococcal vaccination declined by patient       History reviewed. No pertinent past medical history.    History reviewed. No pertinent surgical history.    [facility]    No current outpatient prescriptions on file.       No Known Allergies    Pt reports that he has been smoking Cigarettes.  He has smoked for the past 25.00 years. He has never used smokeless tobacco. He reports that he drinks alcohol. He reports that he does not use illicit drugs.    Exam:  /70  Temp 98.3  F (36.8  C) (Oral)  Resp 18  Ht 1.727 m (5' 8\")  Wt 81.6 kg (180 lb)  SpO2 99%  BMI 27.37 kg/m2    Awake, Alert OX3  Lungs - CTA bilaterally  CV - RRR, no murmurs, distal pulses intact  Abd - soft, non-distended, non-tender, +BS  Extr - No cyanosis or edema    A/P 66 year old year old male in need of colonoscopy for screening. Risks, benefits, alternatives, and complications were discussed including the possibility of perforation and the patient agreed to proceed    Justice Horner MD     "

## 2017-11-09 NOTE — ANESTHESIA POSTPROCEDURE EVALUATION
Patient: Bruno Baig    Procedure(s):  colonoscopy - Wound Class: II-Clean Contaminated    Diagnosis:screening  Diagnosis Additional Information: No value filed.    Anesthesia Type:  MAC    Note:  Anesthesia Post Evaluation    Patient location during evaluation: Phase 2  Patient participation: Able to fully participate in evaluation  Level of consciousness: awake  Pain management: adequate  Airway patency: patent  Cardiovascular status: acceptable  Respiratory status: acceptable  Hydration status: acceptable  PONV: none     Anesthetic complications: None          Last vitals:  Vitals:    11/09/17 0942   BP: 125/70   Resp: 18   Temp: 36.8  C (98.3  F)   SpO2: 99%         Electronically Signed By: ILENE Hassan CRNA  November 9, 2017  10:48 AM

## 2017-11-09 NOTE — ANESTHESIA CARE TRANSFER NOTE
Patient: Bruno Baig    Procedure(s):  colonoscopy - Wound Class: II-Clean Contaminated    Diagnosis: screening  Diagnosis Additional Information: No value filed.    Anesthesia Type:   MAC     Note:  Airway :Room Air  Patient transferred to:Phase II  Handoff Report: Identifed the Patient, Identified the Reponsible Provider, Reviewed the pertinent medical history, Discussed the surgical course, Reviewed Intra-OP anesthesia mangement and issues during anesthesia, Set expectations for post-procedure period and Allowed opportunity for questions and acknowledgement of understanding      Vitals: (Last set prior to Anesthesia Care Transfer)    CRNA VITALS  11/9/2017 1014 - 11/9/2017 1045      11/9/2017             Pulse: 81    SpO2: 98 %                Electronically Signed By: ILENE Hassan CRNA  November 9, 2017  10:45 AM

## 2017-12-11 DIAGNOSIS — M10.9 ACUTE GOUT, UNSPECIFIED CAUSE, UNSPECIFIED SITE: ICD-10-CM

## 2017-12-11 RX ORDER — ALLOPURINOL 100 MG/1
100 TABLET ORAL DAILY
Qty: 30 TABLET | Refills: 0 | Status: SHIPPED | OUTPATIENT
Start: 2017-12-11 | End: 2018-01-02

## 2017-12-11 NOTE — TELEPHONE ENCOUNTER
Allopurinol       Last Written Prescription Date:  10/30/17  Last Fill Quantity: 30,   # refills: 1  Last Office Visit: 7/24/17  Future Office visit:       Routing refill request to provider for review/approval because:  Drug not on the G, P or [facility] refill protocol or controlled substance

## 2017-12-27 DIAGNOSIS — M10.9 ACUTE GOUT, UNSPECIFIED CAUSE, UNSPECIFIED SITE: ICD-10-CM

## 2017-12-27 DIAGNOSIS — E78.5 HYPERLIPIDEMIA LDL GOAL <100: ICD-10-CM

## 2017-12-27 LAB
CHOLEST SERPL-MCNC: 234 MG/DL
HDLC SERPL-MCNC: 37 MG/DL
LDLC SERPL CALC-MCNC: ABNORMAL MG/DL
LDLC SERPL DIRECT ASSAY-MCNC: 129 MG/DL
NONHDLC SERPL-MCNC: 197 MG/DL
TRIGL SERPL-MCNC: 469 MG/DL
URATE SERPL-MCNC: 5.7 MG/DL (ref 3.5–7.2)

## 2017-12-27 PROCEDURE — 83721 ASSAY OF BLOOD LIPOPROTEIN: CPT | Performed by: FAMILY MEDICINE

## 2017-12-27 PROCEDURE — 80061 LIPID PANEL: CPT | Performed by: FAMILY MEDICINE

## 2017-12-27 PROCEDURE — 36415 COLL VENOUS BLD VENIPUNCTURE: CPT | Performed by: FAMILY MEDICINE

## 2017-12-27 PROCEDURE — 84550 ASSAY OF BLOOD/URIC ACID: CPT | Performed by: FAMILY MEDICINE

## 2018-01-02 DIAGNOSIS — M10.9 ACUTE GOUT, UNSPECIFIED CAUSE, UNSPECIFIED SITE: ICD-10-CM

## 2018-01-02 RX ORDER — ALLOPURINOL 100 MG/1
100 TABLET ORAL DAILY
Qty: 90 TABLET | Refills: 3 | Status: SHIPPED | OUTPATIENT
Start: 2018-01-02 | End: 2018-12-22

## 2018-02-11 DIAGNOSIS — I10 BENIGN ESSENTIAL HYPERTENSION: ICD-10-CM

## 2018-02-11 NOTE — TELEPHONE ENCOUNTER
Requested Prescriptions   Pending Prescriptions Disp Refills     gemfibrozil (LOPID) 600 MG tablet  Last Written Prescription Date:  11/16/17  Last Fill Quantity: 60,  # refills: 3   Last office visit: 7/24/2017 with prescribing provider:  DANIELLE Lopez   Future Office Visit:     60 tablet 0     Sig: Take 1 tablet (600 mg) by mouth 2 times daily    There is no refill protocol information for this order

## 2018-02-12 RX ORDER — GEMFIBROZIL 600 MG/1
600 TABLET, FILM COATED ORAL 2 TIMES DAILY
Qty: 60 TABLET | Refills: 1 | Status: SHIPPED | OUTPATIENT
Start: 2018-02-12 | End: 2018-04-30

## 2018-04-30 DIAGNOSIS — I10 BENIGN ESSENTIAL HYPERTENSION: ICD-10-CM

## 2018-04-30 RX ORDER — GEMFIBROZIL 600 MG/1
600 TABLET, FILM COATED ORAL 2 TIMES DAILY
Qty: 60 TABLET | Refills: 5 | Status: SHIPPED | OUTPATIENT
Start: 2018-04-30 | End: 2018-11-26

## 2018-04-30 NOTE — TELEPHONE ENCOUNTER
"Requested Prescriptions   Pending Prescriptions Disp Refills     gemfibrozil (LOPID) 600 MG tablet 60 tablet 1     Sig: Take 1 tablet (600 mg) by mouth 2 times daily    Fibrates Passed    4/30/2018  8:03 AM       Passed - Lipid panel on file in past 12 months    Recent Labs   Lab Test  12/27/17   0904   CHOL  234*   TRIG  469*   HDL  37*   LDL  Cannot estimate LDL when triglyceride exceeds 400 mg/dL  129*   NHDL  197*              Passed - No abnormal creatine kinase in past 12 months    No lab results found.            Passed - Recent (12 mo) or future (30 days) visit within the authorizing provider's specialty    Patient had office visit in the last 12 months or has a visit in the next 30 days with authorizing provider or within the authorizing provider's specialty.  See \"Patient Info\" tab in inbasket, or \"Choose Columns\" in Meds & Orders section of the refill encounter.           Passed - Patient is age 18 or older        Last Written Prescription Date:  2/12/18  Last Fill Quantity: 60,  # refills: 1   Last office visit: 7/24/2017 with prescribing provider:  John   Future Office Visit:      "

## 2018-06-05 ENCOUNTER — TELEPHONE (OUTPATIENT)
Dept: FAMILY MEDICINE | Facility: CLINIC | Age: 68
End: 2018-06-05

## 2018-06-05 DIAGNOSIS — M10.9 ACUTE GOUTY ARTHRITIS: Primary | ICD-10-CM

## 2018-06-05 RX ORDER — INDOMETHACIN 50 MG/1
50 CAPSULE ORAL
Qty: 21 CAPSULE | Refills: 1 | Status: SHIPPED | OUTPATIENT
Start: 2018-06-05 | End: 2018-06-18

## 2018-06-05 NOTE — TELEPHONE ENCOUNTER
Reason for call:  Patient reporting a symptom    Symptom or request: Pt says he is on Allopurinol 100 mg daily and has a break through of GOUT in his left great toe.     Duration (how long have symptoms been present): Started yesterday    Have you been treated for this before? Yes    Additional comments: [facility] [place]    Phone Number patient can be reached at:  Home number on file [phone] (home)    Best Time:  anytime    Can we leave a detailed message on this number:  YES    Call taken on 6/5/2018 at 8:58 AM by Lisa Wang

## 2018-06-18 ENCOUNTER — OFFICE VISIT (OUTPATIENT)
Dept: FAMILY MEDICINE | Facility: CLINIC | Age: 68
End: 2018-06-18
Payer: COMMERCIAL

## 2018-06-18 VITALS
DIASTOLIC BLOOD PRESSURE: 66 MMHG | SYSTOLIC BLOOD PRESSURE: 138 MMHG | TEMPERATURE: 97.4 F | HEIGHT: 67 IN | HEART RATE: 72 BPM | WEIGHT: 181.4 LBS | BODY MASS INDEX: 28.47 KG/M2

## 2018-06-18 DIAGNOSIS — Z00.00 ENCOUNTER FOR WELL ADULT EXAM WITHOUT ABNORMAL FINDINGS: Primary | ICD-10-CM

## 2018-06-18 DIAGNOSIS — M10.9 ACUTE GOUT, UNSPECIFIED CAUSE, UNSPECIFIED SITE: ICD-10-CM

## 2018-06-18 DIAGNOSIS — I10 BENIGN ESSENTIAL HYPERTENSION: ICD-10-CM

## 2018-06-18 DIAGNOSIS — Z87.891 PERSONAL HISTORY OF TOBACCO USE: ICD-10-CM

## 2018-06-18 DIAGNOSIS — E78.5 HYPERLIPIDEMIA LDL GOAL <100: ICD-10-CM

## 2018-06-18 DIAGNOSIS — I71.40 ABDOMINAL AORTIC ANEURYSM (AAA) WITHOUT RUPTURE (H): ICD-10-CM

## 2018-06-18 DIAGNOSIS — Z72.0 TOBACCO ABUSE: ICD-10-CM

## 2018-06-18 LAB
ALBUMIN SERPL-MCNC: 3.6 G/DL (ref 3.4–5)
ALP SERPL-CCNC: 65 U/L (ref 40–150)
ALT SERPL W P-5'-P-CCNC: 14 U/L (ref 0–70)
ANION GAP SERPL CALCULATED.3IONS-SCNC: 7 MMOL/L (ref 3–14)
AST SERPL W P-5'-P-CCNC: 18 U/L (ref 0–45)
BASOPHILS # BLD AUTO: 0.1 10E9/L (ref 0–0.2)
BASOPHILS NFR BLD AUTO: 0.9 %
BILIRUB DIRECT SERPL-MCNC: 0.1 MG/DL (ref 0–0.2)
BILIRUB SERPL-MCNC: 0.2 MG/DL (ref 0.2–1.3)
BUN SERPL-MCNC: 27 MG/DL (ref 7–30)
CALCIUM SERPL-MCNC: 9.8 MG/DL (ref 8.5–10.1)
CHLORIDE SERPL-SCNC: 104 MMOL/L (ref 94–109)
CHOLEST SERPL-MCNC: 204 MG/DL
CO2 SERPL-SCNC: 25 MMOL/L (ref 20–32)
CREAT SERPL-MCNC: 1.2 MG/DL (ref 0.66–1.25)
DIFFERENTIAL METHOD BLD: ABNORMAL
EOSINOPHIL # BLD AUTO: 0.3 10E9/L (ref 0–0.7)
EOSINOPHIL NFR BLD AUTO: 4.8 %
ERYTHROCYTE [DISTWIDTH] IN BLOOD BY AUTOMATED COUNT: 11.7 % (ref 10–15)
GFR SERPL CREATININE-BSD FRML MDRD: 60 ML/MIN/1.7M2
GLUCOSE SERPL-MCNC: 110 MG/DL (ref 70–99)
HCT VFR BLD AUTO: 34.8 % (ref 40–53)
HDLC SERPL-MCNC: 34 MG/DL
HGB BLD-MCNC: 11.6 G/DL (ref 13.3–17.7)
LDLC SERPL CALC-MCNC: 134 MG/DL
LYMPHOCYTES # BLD AUTO: 2.2 10E9/L (ref 0.8–5.3)
LYMPHOCYTES NFR BLD AUTO: 32 %
MCH RBC QN AUTO: 32.2 PG (ref 26.5–33)
MCHC RBC AUTO-ENTMCNC: 33.3 G/DL (ref 31.5–36.5)
MCV RBC AUTO: 97 FL (ref 78–100)
MONOCYTES # BLD AUTO: 0.8 10E9/L (ref 0–1.3)
MONOCYTES NFR BLD AUTO: 12 %
NEUTROPHILS # BLD AUTO: 3.5 10E9/L (ref 1.6–8.3)
NEUTROPHILS NFR BLD AUTO: 50.3 %
NONHDLC SERPL-MCNC: 170 MG/DL
PLATELET # BLD AUTO: 487 10E9/L (ref 150–450)
POTASSIUM SERPL-SCNC: 4.6 MMOL/L (ref 3.4–5.3)
PROT SERPL-MCNC: 8.3 G/DL (ref 6.8–8.8)
RBC # BLD AUTO: 3.6 10E12/L (ref 4.4–5.9)
SODIUM SERPL-SCNC: 136 MMOL/L (ref 133–144)
TRIGL SERPL-MCNC: 180 MG/DL
TSH SERPL DL<=0.005 MIU/L-ACNC: 1.47 MU/L (ref 0.4–4)
URATE SERPL-MCNC: 5.4 MG/DL (ref 3.5–7.2)
WBC # BLD AUTO: 6.9 10E9/L (ref 4–11)

## 2018-06-18 PROCEDURE — 84550 ASSAY OF BLOOD/URIC ACID: CPT | Performed by: FAMILY MEDICINE

## 2018-06-18 PROCEDURE — 80061 LIPID PANEL: CPT | Performed by: FAMILY MEDICINE

## 2018-06-18 PROCEDURE — 85025 COMPLETE CBC W/AUTO DIFF WBC: CPT | Performed by: FAMILY MEDICINE

## 2018-06-18 PROCEDURE — 80048 BASIC METABOLIC PNL TOTAL CA: CPT | Performed by: FAMILY MEDICINE

## 2018-06-18 PROCEDURE — G0296 VISIT TO DETERM LDCT ELIG: HCPCS | Performed by: FAMILY MEDICINE

## 2018-06-18 PROCEDURE — 80076 HEPATIC FUNCTION PANEL: CPT | Performed by: FAMILY MEDICINE

## 2018-06-18 PROCEDURE — 84443 ASSAY THYROID STIM HORMONE: CPT | Performed by: FAMILY MEDICINE

## 2018-06-18 PROCEDURE — 36415 COLL VENOUS BLD VENIPUNCTURE: CPT | Performed by: FAMILY MEDICINE

## 2018-06-18 PROCEDURE — G0438 PPPS, INITIAL VISIT: HCPCS | Performed by: FAMILY MEDICINE

## 2018-06-18 RX ORDER — CHLORAL HYDRATE 500 MG
1 CAPSULE ORAL EVERY MORNING
COMMUNITY
Start: 2018-06-18 | End: 2023-07-11 | Stop reason: ALTCHOICE

## 2018-06-18 RX ORDER — ACETAMINOPHEN 160 MG
1 TABLET,DISINTEGRATING ORAL EVERY MORNING
COMMUNITY

## 2018-06-18 RX ORDER — LOSARTAN POTASSIUM AND HYDROCHLOROTHIAZIDE 12.5; 1 MG/1; MG/1
1 TABLET ORAL DAILY
Qty: 90 TABLET | Refills: 3 | Status: SHIPPED | OUTPATIENT
Start: 2018-06-18 | End: 2019-07-01

## 2018-06-18 ASSESSMENT — ACTIVITIES OF DAILY LIVING (ADL)
I_NEED_ASSISTANCE_FOR_THE_FOLLOWING_DAILY_ACTIVITIES:: NO ASSISTANCE IS NEEDED
CURRENT_FUNCTION: NO ASSISTANCE NEEDED

## 2018-06-18 NOTE — MR AVS SNAPSHOT
After Visit Summary   6/18/2018    Bruno Baig    MRN: [id number]           Patient Information     Date Of Birth          1950        Visit Information        Provider Department      6/18/2018 9:00 AM Charisse Lopez MD [facility]        Today's Diagnoses     Encounter for well adult exam without abnormal findings    -  1    Benign essential hypertension        Hyperlipidemia LDL goal <100        Tobacco abuse        Abdominal aortic aneurysm (AAA) without rupture (H)        Acute gout, unspecified cause, unspecified site          Care Instructions      Preventive Health Recommendations:   Male Ages 65 and over    Yearly exam:             See your health care provider every year in order to  o   Review health changes.   o   Discuss preventive care.    o   Review your medicines if your doctor has prescribed any.    Talk with your health care provider about whether you should have a test to screen for prostate cancer (PSA).    Every 3 years, have a diabetes test (fasting glucose). If you are at risk for diabetes, you should have this test more often.    Every 5 years, have a cholesterol test. Have this test more often if you are at risk for high cholesterol or heart disease.     Every 10 years, have a colonoscopy. Or, have a yearly FIT test (stool test). These exams will check for colon cancer.    Talk to with your health care provider about screening for Abdominal Aortic Aneurysm if you have a family history of AAA or have a history of smoking.    Shots:     Get a flu shot each year.     Get a tetanus shot every 10 years.     Talk to your doctor about your pneumonia vaccines. There are now two you should receive - Pneumovax (PPSV 23) and Prevnar (PCV 13).     Talk to your doctor about a shingles vaccine.     Talk to your doctor about the hepatitis B vaccine.  Nutrition:     Eat at least 5 servings of fruits and vegetables each day.     Eat whole-grain bread,  whole-wheat pasta and brown rice instead of white grains and rice.     Talk to your provider about Calcium and Vitamin D.   Lifestyle    Exercise for at least 150 minutes a week (30 minutes a day, 5 days a week). This will help you control your weight and prevent disease.     Limit alcohol to one drink per day.     No smoking.     Wear sunscreen to prevent skin cancer.     See your dentist every six months for an exam and cleaning.     See your eye doctor every 1 to 2 years to screen for conditions such as glaucoma, macular degeneration, cataracts, etc       Labs today   Medications refilled   Recheck in 6 months   You are eligible for lung cancer screening and can set up CT scan      Set up US to assess aorta, this is follow up from September           Follow-ups after your visit        Future tests that were ordered for you today     Open Future Orders        Priority Expected Expires Ordered    US abdominal aorta limited Routine  6/18/2019 6/18/2018            Who to contact     If you have questions or need follow up information about today's clinic visit or your schedule please contact [facility] directly at [phone].  Normal or non-critical lab and imaging results will be communicated to you by Agnitushart, letter or phone within 4 business days after the clinic has received the results. If you do not hear from us within 7 days, please contact the clinic through EnteGreat[employer] or phone. If you have a critical or abnormal lab result, we will notify you by phone as soon as possible.  Submit refill requests through [employer] or call your pharmacy and they will forward the refill request to us. Please allow 3 business days for your refill to be completed.          Additional Information About Your Visit        [employer] Information     [employer] gives you secure access to your electronic health record. If you see a primary care provider, you can also send messages to your care team and make  "appointments. If you have questions, please call your primary care clinic.  If you do not have a primary care provider, please call [phone] and they will assist you.        Care EveryWhere ID     This is your Care EveryWhere ID. This could be used by other organizations to access your [place] medical records  [id number]        Your Vitals Were     Pulse Temperature Height BMI (Body Mass Index)          72 97.4  F (36.3  C) (Tympanic) 5' 7.25\" (1.708 m) 28.2 kg/m2         Blood Pressure from Last 3 Encounters:   06/18/18 138/66   11/09/17 131/68   07/24/17 130/70    Weight from Last 3 Encounters:   06/18/18 181 lb 6.4 oz (82.3 kg)   11/09/17 180 lb (81.6 kg)   07/24/17 190 lb (86.2 kg)              We Performed the Following     Basic metabolic panel     CBC with platelets differential     Hepatic panel     Lipid panel reflex to direct LDL Fasting     TSH with free T4 reflex     Uric acid          Today's Medication Changes          These changes are accurate as of 6/18/18  9:39 AM.  If you have any questions, ask your nurse or doctor.               These medicines have changed or have updated prescriptions.        Dose/Directions    fish oil-omega-3 fatty acids 1000 MG capsule   This may have changed:  how much to take   Changed by:  Charisse Lopez MD        Dose:  1 g   Take 1 capsule (1 g) by mouth daily   Refills:  0       vitamin D3 2000 units Caps   This may have changed:  Another medication with the same name was removed. Continue taking this medication, and follow the directions you see here.   Changed by:  Charisse Lopez MD        Refills:  0            Where to get your medicines      These medications were sent to [facility] [id number] [place] [address] [address]    Hours:  M-F 9-7 SAT 9-6 SUN 11-3 Phone:  [phone]     losartan-hydrochlorothiazide 100-12.5 MG per tablet                Primary Care Provider Office Phone # " Fax #    Charisse Pedro Pablo Lopez [phone] [address] [place] [id number]        Equal Access to Services     TIFFANI REED : Brayan aida atkinson diane House, gera farheenroman, leeta kaayanda diane, handy dukegeneva paulina. So [facility] [phone].    ATENCIÓN: Si habla español, tiene a mcintyre disposición servicios gratuitos de asistencia lingüística. Llame al [phone].    We comply with applicable federal civil rights laws and [place] laws. We do not discriminate on the basis of race, color, national origin, age, disability, sex, sexual orientation, or gender identity.            Thank you!     Thank you for choosing [facility]  for your care. Our goal is always to provide you with excellent care. Hearing back from our patients is one way we can continue to improve our services. Please take a few minutes to complete the written survey that you may receive in the mail after your visit with us. Thank you!             Your Updated Medication List - Protect others around you: Learn how to safely use, store and throw away your medicines at www.disposemymeds.org.          This list is accurate as of 6/18/18  9:39 AM.  Always use your most recent med list.                   Brand Name Dispense Instructions for use Diagnosis    allopurinol 100 MG tablet    ZYLOPRIM    90 tablet    Take 1 tablet (100 mg) by mouth daily    Acute gout, unspecified cause, unspecified site       aspirin 81 MG tablet      Take by mouth daily        blood glucose monitoring meter device kit    no brand specified    1 kit    1 kit by In [employer] route 2 times daily Use to test blood sugar 2 times daily or as directed.    Prediabetes       blood glucose monitoring test strip    no brand specified    1 Box    Use to test blood sugars 2 times daily or as directed    Prediabetes       fish oil-omega-3 fatty acids 1000 MG capsule      Take 1 capsule (1 g) by mouth daily        gemfibrozil  600 MG tablet    LOPID    60 tablet    Take 1 tablet (600 mg) by mouth 2 times daily    Benign essential hypertension       glucosamine-chondroitin 500-400 MG Caps per capsule      Take 2 capsules by mouth daily        losartan-hydrochlorothiazide 100-12.5 MG per tablet    HYZAAR    90 tablet    Take 1 tablet by mouth daily    Benign essential hypertension       vitamin D3 2000 units Caps

## 2018-06-18 NOTE — PATIENT INSTRUCTIONS
Preventive Health Recommendations:   Male Ages 65 and over    Yearly exam:             See your health care provider every year in order to  o   Review health changes.   o   Discuss preventive care.    o   Review your medicines if your doctor has prescribed any.    Talk with your health care provider about whether you should have a test to screen for prostate cancer (PSA).    Every 3 years, have a diabetes test (fasting glucose). If you are at risk for diabetes, you should have this test more often.    Every 5 years, have a cholesterol test. Have this test more often if you are at risk for high cholesterol or heart disease.     Every 10 years, have a colonoscopy. Or, have a yearly FIT test (stool test). These exams will check for colon cancer.    Talk to with your health care provider about screening for Abdominal Aortic Aneurysm if you have a family history of AAA or have a history of smoking.    Shots:     Get a flu shot each year.     Get a tetanus shot every 10 years.     Talk to your doctor about your pneumonia vaccines. There are now two you should receive - Pneumovax (PPSV 23) and Prevnar (PCV 13).     Talk to your doctor about a shingles vaccine.     Talk to your doctor about the hepatitis B vaccine.  Nutrition:     Eat at least 5 servings of fruits and vegetables each day.     Eat whole-grain bread, whole-wheat pasta and brown rice instead of white grains and rice.     Talk to your provider about Calcium and Vitamin D.   Lifestyle    Exercise for at least 150 minutes a week (30 minutes a day, 5 days a week). This will help you control your weight and prevent disease.     Limit alcohol to one drink per day.     No smoking.     Wear sunscreen to prevent skin cancer.     See your dentist every six months for an exam and cleaning.     See your eye doctor every 1 to 2 years to screen for conditions such as glaucoma, macular degeneration, cataracts, etc       Labs today   Medications refilled   Recheck in 6  months   You are eligible for lung cancer screening and can set up CT scan      Set up US to assess aorta, this is follow up from September     Lung Cancer Screening   Frequently Asked Questions  If you are at high-risk for lung cancer, getting screened with low-dose computed tomography (LDCT) every year can help save your life. This handout offers answers to some of the most common questions about lung cancer screening. If you have other questions, please call [phone] ([phone]).     What is it?  Lung cancer screening uses special X-ray technology to create an image of your lung tissue. The exam is quick and easy and takes less than 10 seconds. We don t give you any medicine or use any needles. You can eat before and after the exam. You don t need to change your clothes as long as the clothing on your chest doesn t contain metal. But, you do need to be able to hold your breath for at least 6 seconds during the exam.    What is the goal of lung cancer screening?  The goal of lung cancer screening is to save lives. Many times, lung cancer is not found until a person starts having physical symptoms. Lung cancer screening can help detect lung cancer in the earliest stages when it may be easier to treat.    Who should be screened for lung cancer?  We suggest lung cancer screening for anyone who is at high-risk for lung cancer. You are in the high-risk group if you:      are between the ages of 55 and 79, and    have smoked at least 1 pack of cigarettes a day for 30 or more years, and    still smoke or have quit within the past 15 years.    However, if you have a new cough or shortness of breath, you should talk to your doctor before being screened.    Some national lung health advocacy groups also recommend screening for people ages 50 to 79 who have smoked an average of 1 pack of cigarettes a day for 20 years. They must also have at least 1 other risk factor for lung cancer, not including  exposure to secondhand smoke. Other risk factors are having had cancer in the past, emphysema, pulmonary fibrosis, COPD, a family history of lung cancer, or exposure to certain materials such as arsenic, asbestos, beryllium, cadmium, chromium, diesel fumes, nickel, radon or silica. Your care team can help you know if you have one of these risk factors.     Why does it matter if I have symptoms?  Certain symptoms can be a sign that you have a condition in your lungs that should be checked and treated by your doctor. These symptoms include fever, chest pain, a new or changing cough, shortness of breath that you have never felt before, coughing up blood or unexplained weight loss. Having any of these symptoms can greatly affect the results of lung cancer screening.       Should all smokers get an LDCT lung cancer screening exam?  It depends. Lung cancer screening is for a very specific group of men and women who have a history of heavy smoking over a long period of time (see  Who should be screened for lung cancer  above).  I am in the high-risk group, but have been diagnosed with cancer in the past. Is LDCT lung cancer screening right for me?  In some cases, you should not have LDCT lung screening, such as when your doctor is already following your cancer with CT scan studies. Your doctor will help you decide if LDCT lung screening is right for you.  Do I need to have a screening exam every year?  Yes. If you are in the high-risk group described earlier, you should get an LDCT lung cancer screening exam every year until you are 79, or are no longer willing or able to undergo screening and possible procedures to diagnose and treat lung cancer.  How effective is LDCT at preventing death from lung cancer?  Studies have shown that LDCT lung cancer screening can lower the risk of death from lung cancer by 20 percent in people who are at high-risk.  What are the risks?  There are some risks and limitations of LDCT lung  cancer screening. We want to make sure you understand the risks and benefits, so please let us know if you have any questions. Your doctor may want to talk with you more about these risks.    Radiation exposure: As with any exam that uses radiation, there is a very small increased risk of cancer. The amount of radiation in LDCT is small about the same amount a person would get from a mammogram. Your doctor orders the exam when he or she feels the potential benefits outweigh the risks.    False negatives: No test is perfect, including LDCT. It is possible that you may have a medical condition, including lung cancer, that is not found during your exam. This is called a false negative result.    False positives and more testing: LDCT very often finds something in the lung that could be cancer, but in fact is not. This is called a false positive result. False positive tests often cause anxiety. To make sure these findings are not cancer, you may need to have more tests. These tests will be done only if you give us permission. Sometimes patients need a treatment that can have side effects, such as a biopsy. For more information on false positives, see  What can I expect from the results?     Findings not related to lung cancer: Your LDCT exam also takes pictures of areas of your body next to your lungs. In a very small number of cases, the CT scan will show an abnormal finding in one of these areas, such as your kidneys, adrenal glands, liver or thyroid. This finding may not be serious, but you may need more tests. Your doctor can help you decide what other tests you may need, if any.  What can I expect from the results?  About 1 out of 4 LDCT exams will find something that may need more tests. Most of the time, these findings are lung nodules. Lung nodules are very small collections of tissue in the lung. These nodules are very common, and the vast majority more than 97 percent are not cancer (benign). Most are normal  lymph nodes or small areas of scarring from past infections.  But, if a small lung nodule is found to be cancer, the cancer can be cured more than 90 percent of the time. To know if the nodule is cancer, we may need to get more images before your next yearly screening exam. If the nodule has suspicious features (for example, it is large, has an odd shape or grows over time), we will refer you to a specialist for further testing.  Will my doctor also get the results?  Yes. Your doctor will get a copy of your results.  Is it okay to keep smoking now that there s a cancer screening exam?  No. Tobacco is one of the strongest cancer-causing agents. It causes not only lung cancer, but other cancers and cardiovascular (heart) diseases as well. The damage caused by smoking builds over time. This means that the longer you smoke, the higher your risk of disease. While it is never too late to quit, the sooner you quit, the better.  Where can I find help to quit smoking?  The best way to prevent lung cancer is to stop smoking. If you have already quit smoking, congratulations and keep it up! For help on quitting smoking, please call [employer] at 0-603-407-MEIT ([phone]) or the American Cancer Society at 1-346.772.7137 to find local resources near you.  One-on-one health coaching:  If you d prefer to work individually with a health care provider on tobacco cessation, we offer:      Medication Therapy Management:  Our specially trained pharmacists work closely with you and your doctor to help you quit smoking.  Call [phone] or [phone] (toll free).     Can Do: Health coaching offered by [facility].  [email or web address]

## 2018-06-18 NOTE — NURSING NOTE
"Chief Complaint   Patient presents with     Physical       Initial /66 (BP Location: Right arm, Patient Position: Chair, Cuff Size: Adult Large)  Pulse 72  Temp 97.4  F (36.3  C) (Tympanic)  Ht 5' 7.25\" (1.708 m)  Wt 181 lb 6.4 oz (82.3 kg)  BMI 28.2 kg/m2 Estimated body mass index is 28.2 kg/(m^2) as calculated from the following:    Height as of this encounter: 5' 7.25\" (1.708 m).    Weight as of this encounter: 181 lb 6.4 oz (82.3 kg).      Health Maintenance that is potentially due pending provider review:  NONE    n/a    Is there anyone who you would like to be able to receive your results? No  If yes have patient fill out [facility]    "

## 2018-06-18 NOTE — PROGRESS NOTES
Lung Cancer Screening Shared Decision Making Visit     Bruno Baig is eligible for lung cancer screening on the basis of the information provided in my signed lung cancer screening order.     I have discussed with patient the risks and benefits of screening for lung cancer with low-dose CT.     The risks include:   radiation exposure: one low dose chest CT has as much ionizing radiation as  about 15 chest x-rays or 6 months of background radiation living in [place]     false positives: 96% of positive findings/nodules are NOT cancer, but some  might still require additional diagnostic evaluation, including biopsy   over-diagnosis: some slow growing cancers that might never have been clinically  significant will be detected and treated unnecessarily     The benefit of early detection of lung cancer is contingent upon adherence to annual screening or more frequent follow up if indicated.     Furthermore, reaping the benefits of screening requires Bruno Baig to be willing and physically able to undergo diagnostic procedures, if indicated. Although no specific guide is available for determining severity of comorbidities, it is reasonable to withhold screening in patients who have greater mortality risk from other diseases.     We did discuss that the only way to prevent lung cancer is to not smoke. Smoking cessation assistance was offered.    I did not offer risk estimation using a calculator such as this one:    ShouldIScreen

## 2018-06-18 NOTE — PROGRESS NOTES
SUBJECTIVE:   Bruno Baig is a 67 year old male who presents for Preventive Visit.      Are you in the first 12 months of your Medicare Part B coverage?  No    Answers for HPI/ROS submitted by the patient on 6/18/2018   Annual Exam:  Getting at least 3 servings of Calcium per day:: NO  Bi-annual eye exam:: Yes  Dental care twice a year:: Yes  Sleep apnea or symptoms of sleep apnea:: None  Diet:: Regular (no restrictions)  Frequency of exercise:: 4-5 days/week  Taking medications regularly:: Yes  Medication side effects:: Not applicable  Additional concerns today:: No  Activities of Daily Living: no assistance needed  Home safety: lack of grab bars in the bathroom  Hearing Impairment:: difficulty following a conversation in a noisy restaurant or crowded room  PHQ-2 Score: 0  Duration of exercise:: Less than 15 minutes        Fall risk:  Fallen 2 or more times in the past year?: No  Any fall with injury in the past year?: No        COGNITIVE SCREEN  1) Repeat 3 items (Banana, Sunrise, Chair)    2) Clock draw: NORMAL  3) 3 item recall: Recalls 3 objects  Results: 3 items recalled: COGNITIVE IMPAIRMENT LESS LIKELY    Mini-CogTM Copyright [employer]. Licensed by the author for use in [employer]; reprinted with permission (nina@.[facility]). All rights reserved.            Hyperlipidemia Follow-Up      Rate your low fat/cholesterol diet?: good    Taking statin?  Yes, no muscle aches from statin    Other lipid medications/supplements?:  none    Hypertension Follow-up      Outpatient blood pressures are not being checked.    Low Salt Diet: no added salt    Patient presents for evaluation of hypertension.  He indicates that he is feeling well and denies any symptoms referable to his elevated blood pressure. Specifically denies chest pain, palpitations, dyspnea, orthopnea, PND or peripheral edema. Current medication regimen is as listed below. Patient denies any side effects of medication.          Reviewed and  updated as needed this visit by clinical staff  Tobacco  Allergies  Meds  Problems  Med Hx  Surg Hx  Fam Hx  Soc Hx          Reviewed and updated as needed this visit by Provider  Allergies  Meds  Problems        Social History   Substance Use Topics     Smoking status: Current Every Day Smoker     Packs/day: 1.00     Years: 30.00     Types: Cigarettes     Smokeless tobacco: Never Used     Alcohol use Yes      Comment: 5-6 daily       If you drink alcohol do you typically have >3 drinks per day or >7 drinks per week? Yes - AUDIT SCORE:  10  AUDIT - Alcohol Use Disorders Identification Test - Reproduced from the World Health Organization Audit 2001 (Second Edition) 6/18/2018   1.  How often do you have a drink containing alcohol? 4 or more times a week   2.  How many drinks containing alcohol do you have on a typical day when you are drinking? 5 or 6   3.  How often do you have five or more drinks on one occasion? Daily or almost daily   4.  How often during the last year have you found that you were not able to stop drinking once you had started? Never   5.  How often during the last year have you failed to do what was normally expected of you because of drinking? Never   6.  How often during the last year have you needed a first drink in the morning to get yourself going after a heavy drinking session? Never   7.  How often during the last year have you had a feeling of guilt or remorse after drinking? Never   8.  How often during the last year have you been unable to remember what happened the night before because of your drinking? Never   9.  Have you or someone else been injured because of your drinking? No   10. Has a relative, friend, doctor or other health care worker been concerned about your drinking or suggested you cut down? No   TOTAL SCORE 10         Discussed with pt and he is not interested in changing ETOH use                         Today's PHQ-2 Score:   PHQ-2 ( 1999 [employer]) 6/18/2018  "8/16/2016   Q1: Little interest or pleasure in doing things 0 0   Q2: Feeling down, depressed or hopeless 0 0   PHQ-2 Score 0 0   Q1: Little interest or pleasure in doing things Not at all -   Q2: Feeling down, depressed or hopeless Not at all -   PHQ-2 Score 0 -       Do you feel safe in your environment - Yes    Do you have a Health Care Directive?: No: Advance care planning was reviewed with patient; patient declined at this time.    Current providers sharing in care for this patient include:   Patient Care Team:  Charisse Lopez MD as PCP - General (Family Practice)    The following health maintenance items are reviewed in Epic and correct as of today:  Health Maintenance   Topic Date Due     TETANUS IMMUNIZATION (SYSTEM ASSIGNED)  11/19/1966     PNEUMOCOCCAL (1 of 2 - PCV13) 11/19/2013     INFLUENZA VACCINE (1) 09/01/2018     FALL RISK ASSESSMENT  06/18/2019     PHQ-2 Q1 YR  06/18/2019     LUNG CANCER SCREENING ANNUAL  06/27/2019     ADVANCE DIRECTIVE PLANNING Q5 YRS  07/24/2022     LIPID SCREEN Q5 YR MALE (SYSTEM ASSIGNED)  06/18/2023     COLON CANCER SCREEN (SYSTEM ASSIGNED)  11/09/2027     AORTIC ANEURYSM SCREENING (SYSTEM ASSIGNED)  Completed     HEPATITIS C SCREENING  Completed     Labs reviewed in EPIC    Pneumonia Vaccine:Adults age 65+ who received Pneumovax (PPSV23) at 65 years or older: Should be given PCV13 > 1 year after their most recent PPSV23    ROS:  Constitutional, HEENT, cardiovascular, pulmonary, GI, , musculoskeletal, neuro, skin, endocrine and psych systems are negative, except as otherwise noted.    OBJECTIVE:   /66 (BP Location: Right arm, Patient Position: Chair, Cuff Size: Adult Large)  Pulse 72  Temp 97.4  F (36.3  C) (Tympanic)  Ht 5' 7.25\" (1.708 m)  Wt 181 lb 6.4 oz (82.3 kg)  BMI 28.2 kg/m2 Estimated body mass index is 28.2 kg/(m^2) as calculated from the following:    Height as of this encounter: 5' 7.25\" (1.708 m).    Weight as of this encounter: 181 lb 6.4 oz " (82.3 kg).  EXAM:   GENERAL: healthy, alert and no distress  EYES: Eyes grossly normal to inspection, PERRL and conjunctivae and sclerae normal  HENT: ear canals and TM's normal, nose and mouth without ulcers or lesions  NECK: no adenopathy, no asymmetry, masses, or scars and thyroid normal to palpation  RESP: lungs clear to auscultation - no rales, rhonchi or wheezes  CV: regular rate and rhythm, normal S1 S2, no S3 or S4, no murmur, click or rub, no peripheral edema and peripheral pulses strong  ABDOMEN: soft, nontender, no hepatosplenomegaly, no masses and bowel sounds normal  MS: no gross musculoskeletal defects noted, no edema  SKIN: no suspicious lesions or rashes  NEURO: Normal strength and tone, mentation intact and speech normal  PSYCH: mentation appears normal, affect normal/bright    ASSESSMENT / PLAN:   1. Encounter for well adult exam without abnormal findings      2. Benign essential hypertension  Stable no change in treatment plan.   - losartan-hydrochlorothiazide (HYZAAR) 100-12.5 MG per tablet; Take 1 tablet by mouth daily  Dispense: 90 tablet; Refill: 3  - Hepatic panel  - Basic metabolic panel  - CBC with platelets differential  - TSH with free T4 reflex  - Lipid panel reflex to direct LDL Fasting    3. Hyperlipidemia LDL goal <100  Stable no change in treatment plan.     5. Abdominal aortic aneurysm (AAA) without rupture (H)    - US abdominal aorta limited; Future    6. Acute gout, unspecified cause, unspecified site    - Uric acid    7. Personal history of tobacco use    - Prof fee: Shared Decisionmaking for Lung Cancer Screening  - CT Chest Lung Cancer Scrn Low Dose wo; Future  - Okay for Smoking Cessation Study (PLUTO) to Contact Patient    End of Life Planning:  Patient currently has an advanced directive: Yes.  Practitioner is supportive of decision.    COUNSELING:  Reviewed preventive health counseling, as reflected in patient instructions        Estimated body mass index is 28.2 kg/(m^2)  "as calculated from the following:    Height as of this encounter: 5' 7.25\" (1.708 m).    Weight as of this encounter: 181 lb 6.4 oz (82.3 kg).       reports that he has been smoking Cigarettes.  He has a 30.00 pack-year smoking history. He has never used smokeless tobacco.  Tobacco Cessation Action Plan: Information offered: Patient not interested at this time    Appropriate preventive services were discussed with this patient, including applicable screening as appropriate for cardiovascular disease, diabetes, osteopenia/osteoporosis, and glaucoma.  As appropriate for age/gender, discussed screening for colorectal cancer, prostate cancer, breast cancer, and cervical cancer. Checklist reviewing preventive services available has been given to the patient.    Reviewed patients plan of care and provided an AVS. The Basic Care Plan (routine screening as documented in Health Maintenance) for Bruno meets the Care Plan requirement. This Care Plan has been established and reviewed with the Patient.    Counseling Resources:  ATP IV Guidelines  Pooled Cohorts Equation Calculator  Breast Cancer Risk Calculator  FRAX Risk Assessment  ICSI Preventive Guidelines  Dietary Guidelines for Americans, 2010  USDA's MyPlate  ASA Prophylaxis  Lung CA Screening    Charisse Lopez MD  [facility]  "

## 2018-06-19 DIAGNOSIS — D64.9 LOW HEMOGLOBIN: Primary | ICD-10-CM

## 2018-06-20 DIAGNOSIS — D64.9 LOW HEMOGLOBIN: ICD-10-CM

## 2018-06-20 LAB
BASOPHILS # BLD AUTO: 0 10E9/L (ref 0–0.2)
BASOPHILS NFR BLD AUTO: 0.6 %
DIFFERENTIAL METHOD BLD: ABNORMAL
EOSINOPHIL # BLD AUTO: 0.3 10E9/L (ref 0–0.7)
EOSINOPHIL NFR BLD AUTO: 4.2 %
ERYTHROCYTE [DISTWIDTH] IN BLOOD BY AUTOMATED COUNT: 11.8 % (ref 10–15)
FERRITIN SERPL-MCNC: 674 NG/ML (ref 26–388)
HCT VFR BLD AUTO: 34.6 % (ref 40–53)
HGB BLD-MCNC: 11.4 G/DL (ref 13.3–17.7)
IRON SATN MFR SERPL: 26 % (ref 15–46)
IRON SERPL-MCNC: 95 UG/DL (ref 35–180)
LYMPHOCYTES # BLD AUTO: 2.2 10E9/L (ref 0.8–5.3)
LYMPHOCYTES NFR BLD AUTO: 35.3 %
MCH RBC QN AUTO: 31.8 PG (ref 26.5–33)
MCHC RBC AUTO-ENTMCNC: 32.9 G/DL (ref 31.5–36.5)
MCV RBC AUTO: 96 FL (ref 78–100)
MONOCYTES # BLD AUTO: 0.7 10E9/L (ref 0–1.3)
MONOCYTES NFR BLD AUTO: 11.9 %
NEUTROPHILS # BLD AUTO: 3 10E9/L (ref 1.6–8.3)
NEUTROPHILS NFR BLD AUTO: 48 %
PLATELET # BLD AUTO: 446 10E9/L (ref 150–450)
RBC # BLD AUTO: 3.59 10E12/L (ref 4.4–5.9)
RETICS # AUTO: 55.4 10E9/L (ref 25–95)
RETICS/RBC NFR AUTO: 1.6 % (ref 0.5–2)
TIBC SERPL-MCNC: 366 UG/DL (ref 240–430)
WBC # BLD AUTO: 6.2 10E9/L (ref 4–11)

## 2018-06-20 PROCEDURE — 83550 IRON BINDING TEST: CPT | Performed by: FAMILY MEDICINE

## 2018-06-20 PROCEDURE — 82728 ASSAY OF FERRITIN: CPT | Performed by: FAMILY MEDICINE

## 2018-06-20 PROCEDURE — 85060 BLOOD SMEAR INTERPRETATION: CPT | Performed by: FAMILY MEDICINE

## 2018-06-20 PROCEDURE — 83540 ASSAY OF IRON: CPT | Performed by: FAMILY MEDICINE

## 2018-06-20 PROCEDURE — 85025 COMPLETE CBC W/AUTO DIFF WBC: CPT | Performed by: FAMILY MEDICINE

## 2018-06-20 PROCEDURE — 85045 AUTOMATED RETICULOCYTE COUNT: CPT | Performed by: FAMILY MEDICINE

## 2018-06-20 PROCEDURE — 36415 COLL VENOUS BLD VENIPUNCTURE: CPT | Performed by: FAMILY MEDICINE

## 2018-06-24 LAB — COPATH REPORT: NORMAL

## 2018-06-27 ENCOUNTER — HOSPITAL ENCOUNTER (OUTPATIENT)
Dept: CT IMAGING | Facility: CLINIC | Age: 68
End: 2018-06-27
Attending: FAMILY MEDICINE
Payer: MEDICARE

## 2018-06-27 ENCOUNTER — HOSPITAL ENCOUNTER (OUTPATIENT)
Dept: ULTRASOUND IMAGING | Facility: CLINIC | Age: 68
Discharge: HOME | End: 2018-06-27
Attending: FAMILY MEDICINE | Admitting: FAMILY MEDICINE
Payer: MEDICARE

## 2018-06-27 DIAGNOSIS — Z87.891 PERSONAL HISTORY OF TOBACCO USE: ICD-10-CM

## 2018-06-27 DIAGNOSIS — I71.40 ABDOMINAL AORTIC ANEURYSM (AAA) WITHOUT RUPTURE (H): ICD-10-CM

## 2018-06-27 PROCEDURE — G0297 LDCT FOR LUNG CA SCREEN: HCPCS

## 2018-06-27 PROCEDURE — 76775 US EXAM ABDO BACK WALL LIM: CPT

## 2018-06-29 DIAGNOSIS — D64.9 LOW HEMOGLOBIN: Primary | ICD-10-CM

## 2018-07-05 ENCOUNTER — OFFICE VISIT (OUTPATIENT)
Dept: FAMILY MEDICINE | Facility: CLINIC | Age: 68
End: 2018-07-05
Payer: COMMERCIAL

## 2018-07-05 VITALS
SYSTOLIC BLOOD PRESSURE: 112 MMHG | RESPIRATION RATE: 18 BRPM | WEIGHT: 182 LBS | BODY MASS INDEX: 28.29 KG/M2 | TEMPERATURE: 97.5 F | HEART RATE: 80 BPM | DIASTOLIC BLOOD PRESSURE: 66 MMHG

## 2018-07-05 DIAGNOSIS — K92.1 MELENA: Primary | ICD-10-CM

## 2018-07-05 LAB
ERYTHROCYTE [DISTWIDTH] IN BLOOD BY AUTOMATED COUNT: 11.6 % (ref 10–15)
HCT VFR BLD AUTO: 32.8 % (ref 40–53)
HGB BLD-MCNC: 10.9 G/DL (ref 13.3–17.7)
MCH RBC QN AUTO: 32 PG (ref 26.5–33)
MCHC RBC AUTO-ENTMCNC: 33.2 G/DL (ref 31.5–36.5)
MCV RBC AUTO: 96 FL (ref 78–100)
PLATELET # BLD AUTO: 453 10E9/L (ref 150–450)
RBC # BLD AUTO: 3.41 10E12/L (ref 4.4–5.9)
WBC # BLD AUTO: 6.6 10E9/L (ref 4–11)

## 2018-07-05 PROCEDURE — 99213 OFFICE O/P EST LOW 20 MIN: CPT | Performed by: PHYSICIAN ASSISTANT

## 2018-07-05 PROCEDURE — 36415 COLL VENOUS BLD VENIPUNCTURE: CPT | Performed by: PHYSICIAN ASSISTANT

## 2018-07-05 PROCEDURE — 85027 COMPLETE CBC AUTOMATED: CPT | Performed by: PHYSICIAN ASSISTANT

## 2018-07-05 ASSESSMENT — ENCOUNTER SYMPTOMS
FOCAL WEAKNESS: 0
DIZZINESS: 0
BLURRED VISION: 0
CHILLS: 0
HEMATURIA: 0
DYSURIA: 0
EYE PAIN: 0
SORE THROAT: 0
ABDOMINAL PAIN: 0
PALPITATIONS: 0
WHEEZING: 0
TINGLING: 0
SPUTUM PRODUCTION: 0
SHORTNESS OF BREATH: 0
NAUSEA: 0
HEADACHES: 0
FREQUENCY: 0
WEAKNESS: 0
FEVER: 0
DOUBLE VISION: 0
COUGH: 0
VOMITING: 0
DIARRHEA: 0
SENSORY CHANGE: 0

## 2018-07-05 ASSESSMENT — PAIN SCALES - GENERAL: PAINLEVEL: NO PAIN (0)

## 2018-07-05 NOTE — MR AVS SNAPSHOT
After Visit Summary   7/5/2018    Bruno Baig    MRN: [id number]           Patient Information     Date Of Birth          1950        Visit Information        Provider Department      7/5/2018 9:40 AM Joey Uriarte PA-C [facility]        Today's Diagnoses     Melena    -  1       Follow-ups after your visit        Follow-up notes from your care team     Return if symptoms worsen or fail to improve.      Your next 10 appointments already scheduled     Jul 12, 2018  1:00 PM CDT   New Visit with Guera Arnold MD   [facility] ([facility])    [facility]  [address]  [facility] [address] [phone]              Who to contact     If you have questions or need follow up information about today's clinic visit or your schedule please contact [facility] directly at [phone].  Normal or non-critical lab and imaging results will be communicated to you by MyChart, letter or phone within 4 business days after the clinic has received the results. If you do not hear from us within 7 days, please contact the clinic through MyChart or phone. If you have a critical or abnormal lab result, we will notify you by phone as soon as possible.  Submit refill requests through [employer] or call your pharmacy and they will forward the refill request to us. Please allow 3 business days for your refill to be completed.          Additional Information About Your Visit        MyChart Information     [employer] gives you secure access to your electronic health record. If you see a primary care provider, you can also send messages to your care team and make appointments. If you have questions, please call your primary care clinic.  If you do not have a primary care provider, please call [phone] and they will assist you.        Care EveryWhere ID     This is your Care EveryWhere ID. This could be used by other  organizations to access your [place] medical records  [id number]        Your Vitals Were     Pulse Temperature Respirations BMI (Body Mass Index)          80 97.5  F (36.4  C) (Tympanic) 18 28.29 kg/m2         Blood Pressure from Last 3 Encounters:   07/05/18 112/66   06/18/18 138/66   11/09/17 131/68    Weight from Last 3 Encounters:   07/05/18 182 lb (82.6 kg)   06/18/18 181 lb 6.4 oz (82.3 kg)   11/09/17 180 lb (81.6 kg)              We Performed the Following     CBC with platelets        Primary Care Provider Office Phone # Fax #    Charisse Lopez [phone] [address] [place] [address]        Equal Access to Services     TIFFANI REED : Brayan contreras Soshey, waaxda luqadaha, qaybta kaalmada wenceslaoyademarcus, handy majano . So [facility] [phone].    ATENCIÓN: Si habla español, tiene a mcintyre disposición servicios gratuitos de asistencia lingüística. Llame al [phone].    We comply with applicable federal civil rights laws and [place] laws. We do not discriminate on the basis of race, color, national origin, age, disability, sex, sexual orientation, or gender identity.            Thank you!     Thank you for choosing [facility]  for your care. Our goal is always to provide you with excellent care. Hearing back from our patients is one way we can continue to improve our services. Please take a few minutes to complete the written survey that you may receive in the mail after your visit with us. Thank you!             Your Updated Medication List - Protect others around you: Learn how to safely use, store and throw away your medicines at www.disposemymeds.org.          This list is accurate as of 7/5/18 12:27 PM.  Always use your most recent med list.                   Brand Name Dispense Instructions for use Diagnosis    allopurinol 100 MG tablet    ZYLOPRIM    90 tablet    Take 1 tablet (100 mg) by mouth daily    Acute  gout, unspecified cause, unspecified site       aspirin 81 MG tablet      Take by mouth daily        blood glucose monitoring meter device kit    no brand specified    1 kit    1 kit by In Vitro route 2 times daily Use to test blood sugar 2 times daily or as directed.    Prediabetes       blood glucose monitoring test strip    no brand specified    1 Box    Use to test blood sugars 2 times daily or as directed    Prediabetes       fish oil-omega-3 fatty acids 1000 MG capsule      Take 1 capsule (1 g) by mouth daily        gemfibrozil 600 MG tablet    LOPID    60 tablet    Take 1 tablet (600 mg) by mouth 2 times daily    Benign essential hypertension       glucosamine-chondroitin 500-400 MG Caps per capsule      Take 2 capsules by mouth daily        losartan-hydrochlorothiazide 100-12.5 MG per tablet    HYZAAR    90 tablet    Take 1 tablet by mouth daily    Benign essential hypertension       vitamin D3 2000 units Caps

## 2018-07-05 NOTE — PROGRESS NOTES
HPI  SUBJECTIVE:   Bruno Baig is a 67 year old male who presents to clinic today for dark black stools on 6/29/18 and 6/30/18. No family history of colon cancer. He did have mild cramping abdominal discomfort for 2 days leading up to the black stools. He has noticed some weight loss, 20 pounds in the last 1.5 years. No changes in diet or exercise. No fever or chills, no fatigue. Last Colonoscopy was done in fall of 2017. No history of hemorrhoids or GI ulcers. Patient is a smoker. He is seeing hematology on 7/12/18 for recent drop in hemoglobin.       Concern - Blood in Stool  Onset: 7/1/18    Description:   Noticed some blood in his stool over the weekend    Intensity: mild    Progression of Symptoms:  same    Accompanying Signs & Symptoms:  Dark Stool with blood, no issues with constipation. Did recently have a drop in hemoglobin on 6/20/18. Is currently scheduled with hematology on 7/12/18    Previous history of similar problem:   no    Precipitating factors:   Worsened by: NA    Alleviating factors:  Improved by: NA    Colonoscopy 11/9/17    Therapies Tried and outcome: none        Problem list and histories reviewed & adjusted, as indicated.  Additional history: as documented    Patient Active Problem List   Diagnosis     Acute gout, unspecified cause, unspecified site     Benign essential hypertension     Pre-diabetes     Abdominal aortic aneurysm (AAA) without rupture (H)     Advanced directives, counseling/discussion     Hyperlipidemia LDL goal <100     Tobacco abuse     Refuses tetanus, diphtheria, and acellular pertussis (TDaP) vaccination     Pneumococcal vaccination declined by patient     Past Surgical History:   Procedure Laterality Date     COLONOSCOPY N/A 11/9/2017    Procedure: COLONOSCOPY;  colonoscopy;  Surgeon: Justice Horner MD;  Location: [facility]       Social History   Substance Use Topics     Smoking status: Current Every Day Smoker     Packs/day: 1.00     Years: 30.00     Types: Cigarettes      Smokeless tobacco: Never Used     Alcohol use Yes      Comment: 5-6 daily     Family History   Problem Relation Age of Onset     Other Cancer Mother      lung     Heart Failure Father      Lung Cancer Sister          Current Outpatient Prescriptions   Medication Sig Dispense Refill     allopurinol (ZYLOPRIM) 100 MG tablet Take 1 tablet (100 mg) by mouth daily 90 tablet 3     aspirin 81 MG tablet Take by mouth daily       blood glucose monitoring (NO BRAND SPECIFIED) meter device kit 1 kit by In Vitro route 2 times daily Use to test blood sugar 2 times daily or as directed. 1 kit 0     blood glucose monitoring (NO BRAND SPECIFIED) test strip Use to test blood sugars 2 times daily or as directed 1 Box 1     Cholecalciferol (VITAMIN D3) 2000 units CAPS        fish oil-omega-3 fatty acids 1000 MG capsule Take 1 capsule (1 g) by mouth daily       gemfibrozil (LOPID) 600 MG tablet Take 1 tablet (600 mg) by mouth 2 times daily 60 tablet 5     glucosamine-chondroitin 500-400 MG CAPS Take 2 capsules by mouth daily       losartan-hydrochlorothiazide (HYZAAR) 100-12.5 MG per tablet Take 1 tablet by mouth daily 90 tablet 3     No Known Allergies    Reviewed and updated as needed this visit by clinical staff       Reviewed and updated as needed this visit by Provider          Review of Systems   Constitutional: Negative for chills, fever and malaise/fatigue. Weight loss:  lost 20 pounds in 1.5 years.   HENT: Negative for hearing loss, sore throat and tinnitus.    Eyes: Negative for blurred vision, double vision and pain.   Respiratory: Negative for cough, sputum production, shortness of breath and wheezing.    Cardiovascular: Negative for chest pain, palpitations and leg swelling.   Gastrointestinal: Negative for abdominal pain, diarrhea, nausea and vomiting.   Genitourinary: Negative for dysuria, frequency, hematuria and urgency.   Skin: Negative for itching and rash.   Neurological: Negative for dizziness, tingling,  sensory change, focal weakness, weakness and headaches.       OBJECTIVE:    /66 (BP Location: Right arm, Patient Position: Chair, Cuff Size: Adult Regular)  Pulse 80  Temp 97.5  F (36.4  C) (Tympanic)  Resp 18  Wt 182 lb (82.6 kg)  BMI 28.29 kg/m2      Physical Exam   Constitutional: He is well-developed, well-nourished, and in no distress. No distress.   HENT:   Head: Normocephalic and atraumatic.   Right Ear: External ear normal.   Left Ear: External ear normal.   Nose: Nose normal.   Eyes: Conjunctivae are normal. Pupils are equal, round, and reactive to light. Right eye exhibits no discharge. Left eye exhibits no discharge. No scleral icterus.   Neck: Neck supple. No JVD present. No tracheal deviation present.   Cardiovascular: Normal rate, regular rhythm, normal heart sounds and intact distal pulses.  Exam reveals no gallop and no friction rub.    No murmur heard.  Pulmonary/Chest: Effort normal and breath sounds normal. No respiratory distress. He has no wheezes. He has no rales. He exhibits no tenderness.   Abdominal: Soft. Bowel sounds are normal.   Neurological: He is alert. Gait normal.   Skin: Skin is warm and dry. He is not diaphoretic.     ASSESSMENT/PLAN:    (K92.1) Melena  (primary encounter diagnosis)  Plan: CBC with platelets    Discussed need for upper endoscopy. Patient refused for now, but will consider if he has continuing symptoms. He does have an upcoming hematology appointment next week for his low hemoglobin, and will discuss it then as well. Return to clinic if symptoms do not resolve or any new symptoms develop.       ROSARIO SawantS  July 5, 2018      Joey Uriarte PA-C  [facility]

## 2018-07-12 ENCOUNTER — HOSPITAL ENCOUNTER (OUTPATIENT)
Dept: LAB | Facility: CLINIC | Age: 68
Discharge: HOME | End: 2018-07-12
Attending: INTERNAL MEDICINE | Admitting: INTERNAL MEDICINE
Payer: MEDICARE

## 2018-07-12 ENCOUNTER — ONCOLOGY VISIT (OUTPATIENT)
Dept: ONCOLOGY | Facility: CLINIC | Age: 68
End: 2018-07-12
Attending: INTERNAL MEDICINE
Payer: COMMERCIAL

## 2018-07-12 VITALS
HEIGHT: 67 IN | TEMPERATURE: 97 F | DIASTOLIC BLOOD PRESSURE: 90 MMHG | SYSTOLIC BLOOD PRESSURE: 133 MMHG | BODY MASS INDEX: 28.39 KG/M2 | RESPIRATION RATE: 16 BRPM | HEART RATE: 60 BPM | WEIGHT: 180.9 LBS | OXYGEN SATURATION: 96 %

## 2018-07-12 DIAGNOSIS — Z72.0 TOBACCO ABUSE: ICD-10-CM

## 2018-07-12 DIAGNOSIS — E78.5 HYPERLIPIDEMIA LDL GOAL <100: ICD-10-CM

## 2018-07-12 DIAGNOSIS — D64.9 ANEMIA, UNSPECIFIED TYPE: ICD-10-CM

## 2018-07-12 DIAGNOSIS — M10.9 ACUTE GOUT, UNSPECIFIED CAUSE, UNSPECIFIED SITE: ICD-10-CM

## 2018-07-12 DIAGNOSIS — I10 BENIGN ESSENTIAL HYPERTENSION: Primary | ICD-10-CM

## 2018-07-12 LAB
BASOPHILS # BLD AUTO: 0 10E9/L (ref 0–0.2)
BASOPHILS NFR BLD AUTO: 0.5 %
DIFFERENTIAL METHOD BLD: ABNORMAL
EOSINOPHIL # BLD AUTO: 0.3 10E9/L (ref 0–0.7)
EOSINOPHIL NFR BLD AUTO: 4.1 %
ERYTHROCYTE [DISTWIDTH] IN BLOOD BY AUTOMATED COUNT: 12.6 % (ref 10–15)
FOLATE SERPL-MCNC: 13.3 NG/ML
HCT VFR BLD AUTO: 31.9 % (ref 40–53)
HGB BLD-MCNC: 10.6 G/DL (ref 13.3–17.7)
IMM GRANULOCYTES # BLD: 0 10E9/L (ref 0–0.4)
IMM GRANULOCYTES NFR BLD: 0.5 %
LDH SERPL L TO P-CCNC: 136 U/L (ref 85–227)
LYMPHOCYTES # BLD AUTO: 2.3 10E9/L (ref 0.8–5.3)
LYMPHOCYTES NFR BLD AUTO: 37.1 %
MCH RBC QN AUTO: 31.9 PG (ref 26.5–33)
MCHC RBC AUTO-ENTMCNC: 33.2 G/DL (ref 31.5–36.5)
MCV RBC AUTO: 96 FL (ref 78–100)
MONOCYTES # BLD AUTO: 0.8 10E9/L (ref 0–1.3)
MONOCYTES NFR BLD AUTO: 13 %
NEUTROPHILS # BLD AUTO: 2.7 10E9/L (ref 1.6–8.3)
NEUTROPHILS NFR BLD AUTO: 44.8 %
NRBC # BLD AUTO: 0 10*3/UL
NRBC BLD AUTO-RTO: 0 /100
PLATELET # BLD AUTO: 346 10E9/L (ref 150–450)
RBC # BLD AUTO: 3.32 10E12/L (ref 4.4–5.9)
VIT B12 SERPL-MCNC: 190 PG/ML (ref 193–986)
WBC # BLD AUTO: 6.1 10E9/L (ref 4–11)

## 2018-07-12 PROCEDURE — 82668 ASSAY OF ERYTHROPOIETIN: CPT | Performed by: INTERNAL MEDICINE

## 2018-07-12 PROCEDURE — 82746 ASSAY OF FOLIC ACID SERUM: CPT | Performed by: INTERNAL MEDICINE

## 2018-07-12 PROCEDURE — 82607 VITAMIN B-12: CPT | Performed by: INTERNAL MEDICINE

## 2018-07-12 PROCEDURE — 85025 COMPLETE CBC W/AUTO DIFF WBC: CPT | Performed by: INTERNAL MEDICINE

## 2018-07-12 PROCEDURE — 36415 COLL VENOUS BLD VENIPUNCTURE: CPT | Performed by: INTERNAL MEDICINE

## 2018-07-12 PROCEDURE — G0463 HOSPITAL OUTPT CLINIC VISIT: HCPCS

## 2018-07-12 PROCEDURE — 84165 PROTEIN E-PHORESIS SERUM: CPT | Performed by: INTERNAL MEDICINE

## 2018-07-12 PROCEDURE — 99204 OFFICE O/P NEW MOD 45 MIN: CPT | Performed by: INTERNAL MEDICINE

## 2018-07-12 PROCEDURE — 00000402 ZZHCL STATISTIC TOTAL PROTEIN: Performed by: INTERNAL MEDICINE

## 2018-07-12 PROCEDURE — 83615 LACTATE (LD) (LDH) ENZYME: CPT | Performed by: INTERNAL MEDICINE

## 2018-07-12 ASSESSMENT — PAIN SCALES - GENERAL: PAINLEVEL: NO PAIN (0)

## 2018-07-12 NOTE — PROGRESS NOTES
DATE OF VISIT: Jul 12, 2018    REASON FOR REFERRAL: Management of anemia    CHIEF COMPLAINT:   Chief Complaint   Patient presents with     Hematology     New Patient - Low hemoglobin       HISTORY OF PRESENT ILLNESS:   67-year-old male patient was on routine blood work found to have mild anemia.  Sequently workup was done showing hemoglobin of 10.9 with a normal white count and mildly elevated platelet count at 453.  Ferritin level of 674 serum iron of 25 with iron saturation index of 26.  TSH was 1.47.  Creatinine was normal at 1.2 liver function tests were all normal.  Blood smear showed mild isolated normochromic normocytic anemia without any evidence of hemolysis.  The patient had a colonoscopy in November of last November which came back was diverticulosis with no active bleeding.  Patient continues to feel well without any complaints of shortness of breath or cough or wheezing.  Denies any bruising or bleeding.  He denies any bone aches or pains.    REVIEW OF SYSTEMS:   Constitutional: Negative for fever, chills, and night sweats.  Skin: negative.  Eyes: negative.  Ears/Nose/Throat: negative.  Respiratory: No shortness of breath, dyspnea on exertion, cough, or hemoptysis.  Cardiovascular: negative.  Gastrointestinal: negative.  Genitourinary: negative.  Musculoskeletal: negative.  Neurologic: negative.  Psychiatric: negative.  Hematologic/Lymphatic/Immunologic: negative.  Endocrine: negative.    PAST MEDICAL HISTORY:   No past medical history on file.    PAST SURGICAL HISTORY:   Past Surgical History:   Procedure Laterality Date     COLONOSCOPY N/A 11/9/2017    Procedure: COLONOSCOPY;  colonoscopy;  Surgeon: Justice Horner MD;  Location: [facility]       ALLERGIES:   Allergies as of 07/12/2018     (No Known Allergies)       MEDICATIONS:   Current Outpatient Prescriptions   Medication Sig Dispense Refill     allopurinol (ZYLOPRIM) 100 MG tablet Take 1 tablet (100 mg) by mouth daily 90 tablet 3     aspirin 81 MG  "tablet Take by mouth daily       Cholecalciferol (VITAMIN D3) 2000 units CAPS        fish oil-omega-3 fatty acids 1000 MG capsule Take 1 capsule (1 g) by mouth daily       gemfibrozil (LOPID) 600 MG tablet Take 1 tablet (600 mg) by mouth 2 times daily 60 tablet 5     glucosamine-chondroitin 500-400 MG CAPS Take 2 capsules by mouth daily       losartan-hydrochlorothiazide (HYZAAR) 100-12.5 MG per tablet Take 1 tablet by mouth daily 90 tablet 3     blood glucose monitoring (NO BRAND SPECIFIED) meter device kit 1 kit by In Vitro route 2 times daily Use to test blood sugar 2 times daily or as directed. 1 kit 0     blood glucose monitoring (NO BRAND SPECIFIED) test strip Use to test blood sugars 2 times daily or as directed 1 Box 1        FAMILY HISTORY:   Family History   Problem Relation Age of Onset     Other Cancer Mother      lung     Heart Failure Father      Lung Cancer Sister       Family history was reviewed with the patient.    SOCIAL HISTORY:   Social History     Social History     Marital status:      Spouse name: N/A     Number of children: N/A     Years of education: N/A     Social History Main Topics     Smoking status: Current Every Day Smoker     Packs/day: 1.00     Years: 30.00     Types: Cigarettes     Smokeless tobacco: Never Used     Alcohol use Yes      Comment: 5-6 daily     Drug use: No     Sexual activity: Yes     Partners: Female     Other Topics Concern     Parent/Sibling W/ Cabg, Mi Or Angioplasty Before 65f 55m? No     Social History Narrative       PHYSICAL EXAMINATION:   /90 (BP Location: Right arm, Patient Position: Sitting, Cuff Size: Adult Regular)  Pulse 60  Temp 97  F (36.1  C) (Tympanic)  Resp 16  Ht 1.708 m (5' 7.24\")  Wt 82.1 kg (180 lb 14.4 oz)  SpO2 96%  BMI 28.13 kg/m2  Wt Readings from Last 10 Encounters:   07/12/18 82.1 kg (180 lb 14.4 oz)   07/05/18 82.6 kg (182 lb)   06/18/18 82.3 kg (181 lb 6.4 oz)   11/09/17 81.6 kg (180 lb)   07/24/17 86.2 kg (190 lb) "   02/03/17 88.3 kg (194 lb 9.6 oz)   10/18/16 88.3 kg (194 lb 9.6 oz)   10/04/16 87.6 kg (193 lb 3.2 oz)   09/06/16 86.5 kg (190 lb 9.6 oz)   08/16/16 85 kg (187 lb 6.4 oz)      GENERAL APPEARANCE: Healthy, alert and in no acute distress.  HEENT: Sclerae anicteric. PERRLA. Oropharynx without ulcers, lesions, or thrush.  NECK: Supple. No asymmetry or masses.  LYMPHATICS: No palpable cervical, supraclavicular, axillary, or inguinal lymphadenopathy.  RESP: Lungs clear to auscultation bilaterally without rales, rhonchi or wheezes.  CARDIOVASCULAR: Regular rate and rhythm. Normal S1, S2; no S3 or S4. No murmur, gallop, or rub.  ABDOMEN: Soft, nontender. Bowel sounds normal. No palpable organomegaly or masses.  MUSCULOSKELETAL: Extremities without gross deformities noted. No edema of bilateral lower extremities.  SKIN: No suspicious lesions or rashes.  NEURO: Alert and oriented x 3. Cranial nerves II-XII grossly intact.  PSYCHIATRIC: Mentation and affect appear normal.    LABORATORY RESULTS:  Office Visit on 07/05/2018   Component Date Value Ref Range Status     WBC 07/05/2018 6.6  4.0 - 11.0 10e9/L Final     RBC Count 07/05/2018 3.41* 4.4 - 5.9 10e12/L Final     Hemoglobin 07/05/2018 10.9* 13.3 - 17.7 g/dL Final     Hematocrit 07/05/2018 32.8* 40.0 - 53.0 % Final     MCV 07/05/2018 96  78 - 100 fl Final     MCH 07/05/2018 32.0  26.5 - 33.0 pg Final     MCHC 07/05/2018 33.2  31.5 - 36.5 g/dL Final     RDW 07/05/2018 11.6  10.0 - 15.0 % Final     Platelet Count 07/05/2018 453* 150 - 450 10e9/L Final       IMAGING RESULTS:  Recent Results (from the past 744 hour(s))   CT Chest Lung Cancer Scrn Low Dose wo    Narrative    CT CHEST LUNG CANCER  SCREEN LOW DOSE  WITHOUT  6/27/2018 12:22 PM    HISTORY:  Personal history of tobacco use.    TECHNIQUE:  Scans obtained from the apices through the diaphragm  without IV contrast. Low dose CT chest technique was utilized.    COMPARISON:  None.    FINDINGS:  Mild linear atelectasis is  suggested at the left lung base.  No enlarged lymph nodes are demonstrated. The thoracic aorta is normal  in caliber.      Impression    IMPRESSION:  No significant pulmonary nodules are demonstrated. ACR  Assessment Category:  Lung-RADS Category 1. Negative, continue annual  screening.    GERTRUDE SEAY MD   US abdominal aorta limited    Narrative    ULTRASOUND ABDOMINAL AORTA IMAGING  6/27/2018 12:52 PM    HISTORY:  Abdominal aortic aneurysm (AAA) without rupture (H).    COMPARISON: 9/11/2017.    FINDINGS: Aneurysmal dilatation of the infrarenal abdominal aorta.  Mild calcification and atherosclerotic changes are noted throughout  the abdominal aorta and iliac arteries. The abdominal aortic  measurements are as follows:    Proximal (suprarenal) aorta: 2.5 cm AP x 2.4 cm transverse.  Mid (infrarenal) aorta:  1.9 cm AP x 1.9 cm transverse.  Distal aorta: 3.3 cm AP x 3.9 cm transverse, previously 3.9 x 3.6 cm.  Right common iliac artery: 1.2 cm.  Left common iliac artery: 1.2 cm.       Impression    IMPRESSION: Aneurysmal dilatation of the infrarenal abdominal aorta  measuring 3.3 x 3.9 cm, not significantly changed.    JENNIFER KRAUSE,        ASSESSMENT AND PLAN:    (D64.9) Anemia, unspecified type  I reviewed with patient today causes of anemia.  Today I will check CBC with platelets differential, Lactate   Dehydrogenase, Vitamin B12, Folate, ELP reflex  to IELP, Erythropoietin. I explained to the patient that the bone marrow biopsy may be required to reach a diagnosis and to rule out myelodysplastic or marrow infiltrative process.  I reviewed with the patient the rationale of bone marrow biopsy and potential complications.    (I10) Benign essential hypertension  (primary encounter diagnosis)  Patient currently on Hyzaar 100-12 0.5 mg orally daily.    (E78.5) Hyperlipidemia LDL goal <100  Patient on Lopid 60 mg twice daily    (Z72.0) Tobacco abuse  Facets the importance of quitting smoking.    (M10.9) Acute gout,  unspecified cause, unspecified site  Currently on allopurinol 100 mg orally daily.    The patient is ready to learn, no apparent learning barriers were identified, Diagnosis and treatment plans were explained to the patient. The patient expressed understanding of the content. The patient questions were answered to his satisfaction.    Guera Arnold MD    Chart documentation with Dragon Voice recognition Software. Although reviewed after completion, some words and grammatical errors may remain.

## 2018-07-12 NOTE — MR AVS SNAPSHOT
After Visit Summary   7/12/2018    Bruno Baig    MRN: [id number]           Patient Information     Date Of Birth          1950        Visit Information        Provider Department      7/12/2018 1:00 PM Guera Arnold MD [facility] ONCOLOGY      Today's Diagnoses     Benign essential hypertension    -  1    Hyperlipidemia LDL goal <100        Tobacco abuse        Acute gout, unspecified cause, unspecified site        Anemia, unspecified type          Care Instructions    We would like to see you back in approximately 1 week, once all of your labs are resulted.           When you are in need of a refill, please call your pharmacy and they will send us a request.      Copy of appointments, and after visit summary (AVS) given to patient.      If you have any questions during business hours (M-F 8 AM- 4PM), please call Judy Chamorro RN, BSN, OCN Oncology Hematology /Breast Cancer Navigator at [facility] [phone].       For questions after business hours, or on holidays/weekends, please call our after hours Nurse Triage line [phone]. Thank you.            Follow-ups after your visit        Follow-up notes from your care team     Return for lab ordered today, Schedule patient when results are back.      Your next 10 appointments already scheduled     Jul 12, 2018  1:50 PM CDT   LAB [facility] ([facility])    [address] [place] [address] [phone]           Please do not eat 10-12 hours before your appointment if you are coming in fasting for labs on lipids, cholesterol, or glucose (sugar). This does not apply to pregnant women. Water, hot tea and black coffee (with nothing added) are okay. Do not drink other fluids, diet soda or chew gum.            Jul 20, 2018 10:30 AM CDT   Return Visit with Guera Arnold MD   [facility] ([facility]  "Hospital)    [facility]  [address]  [facility] [address]   [phone]              Who to contact     If you have questions or need follow up information about today's clinic visit or your schedule please contact [facility] directly at [phone].  Normal or non-critical lab and imaging results will be communicated to you by MyChart, letter or phone within 4 business days after the clinic has received the results. If you do not hear from us within 7 days, please contact the clinic through MyChart or phone. If you have a critical or abnormal lab result, we will notify you by phone as soon as possible.  Submit refill requests through MYOMO or call your pharmacy and they will forward the refill request to us. Please allow 3 business days for your refill to be completed.          Additional Information About Your Visit        MyChart Information     MYOMO gives you secure access to your electronic health record. If you see a primary care provider, you can also send messages to your care team and make appointments. If you have questions, please call your primary care clinic.  If you do not have a primary care provider, please call [phone] and they will assist you.        Care EveryWhere ID     This is your Care EveryWhere ID. This could be used by other organizations to access your [place] medical records  [id number]        Your Vitals Were     Pulse Temperature Respirations Height Pulse Oximetry BMI (Body Mass Index)    60 97  F (36.1  C) (Tympanic) 16 1.708 m (5' 7.24\") 96% 28.13 kg/m2       Blood Pressure from Last 3 Encounters:   07/12/18 133/90   07/05/18 112/66   06/18/18 138/66    Weight from Last 3 Encounters:   07/12/18 82.1 kg (180 lb 14.4 oz)   07/05/18 82.6 kg (182 lb)   06/18/18 82.3 kg (181 lb 6.4 oz)               Primary Care Provider Office Phone # Fax #    Charisse Lopez [phone] [address] [place] [address]" [facility] [address]        Equal Access to Services     [facility] DEREK : Hadii aida atkinson diane Fernandezali, waaxda luqadaha, qaybta kaalmada diane, handy gallardo. So [facility] [phone].    ATENCIÓN: Si habla español, tiene a mcintyre disposición servicios gratuitos de asistencia lingüística. Aydename al [phone].    We comply with applicable federal civil rights laws and [place] laws. We do not discriminate on the basis of race, color, national origin, age, disability, sex, sexual orientation, or gender identity.            Thank you!     Thank you for choosing [facility]  for your care. Our goal is always to provide you with excellent care. Hearing back from our patients is one way we can continue to improve our services. Please take a few minutes to complete the written survey that you may receive in the mail after your visit with us. Thank you!             Your Updated Medication List - Protect others around you: Learn how to safely use, store and throw away your medicines at www.disposemymeds.org.          This list is accurate as of 7/12/18  1:47 PM.  Always use your most recent med list.                   Brand Name Dispense Instructions for use Diagnosis    allopurinol 100 MG tablet    ZYLOPRIM    90 tablet    Take 1 tablet (100 mg) by mouth daily    Acute gout, unspecified cause, unspecified site       aspirin 81 MG tablet      Take by mouth daily        blood glucose monitoring meter device kit    no brand specified    1 kit    1 kit by In Vitro route 2 times daily Use to test blood sugar 2 times daily or as directed.    Prediabetes       blood glucose monitoring test strip    no brand specified    1 Box    Use to test blood sugars 2 times daily or as directed    Prediabetes       fish oil-omega-3 fatty acids 1000 MG capsule      Take 1 capsule (1 g) by mouth daily        gemfibrozil 600 MG tablet    LOPID    60 tablet    Take 1 tablet (600 mg) by mouth 2 times daily    Benign  essential hypertension       glucosamine-chondroitin 500-400 MG Caps per capsule      Take 2 capsules by mouth daily        losartan-hydrochlorothiazide 100-12.5 MG per tablet    HYZAAR    90 tablet    Take 1 tablet by mouth daily    Benign essential hypertension       vitamin D3 2000 units Caps

## 2018-07-12 NOTE — NURSING NOTE
"Oncology Rooming Note    July 12, 2018 1:20 PM   Bruno Baig is a 67 year old male who presents for:    Chief Complaint   Patient presents with     Hematology     New Patient - Low hemoglobin     Initial Vitals: /90 (BP Location: Right arm, Patient Position: Sitting, Cuff Size: Adult Regular)  Pulse 60  Temp 97  F (36.1  C) (Tympanic)  Resp 16  Ht 1.708 m (5' 7.24\")  Wt 82.1 kg (180 lb 14.4 oz)  SpO2 96%  BMI 28.13 kg/m2 Estimated body mass index is 28.13 kg/(m^2) as calculated from the following:    Height as of this encounter: 1.708 m (5' 7.24\").    Weight as of this encounter: 82.1 kg (180 lb 14.4 oz). Body surface area is 1.97 meters squared.  No Pain (0) Comment: Data Unavailable   No LMP for male patient.  Allergies reviewed: Yes  Medications reviewed: Yes    Medications: Medication refills not needed today.  Pharmacy name entered into Innohat: [facility] [id number] [place] [address]    Clinical concerns: New Patient - Low hemoglobin.     7  minutes for nursing intake (face to face time)     Lexie Wells CMA              "

## 2018-07-12 NOTE — PATIENT INSTRUCTIONS
We would like to see you back in approximately 1 week, once all of your labs are resulted.           When you are in need of a refill, please call your pharmacy and they will send us a request.      Copy of appointments, and after visit summary (AVS) given to patient.      If you have any questions during business hours (M-F 8 AM- 4PM), please call Judy Chamorro RN, BSN, OCN Oncology Hematology /Breast Cancer Navigator at [facility] [phone].       For questions after business hours, or on holidays/weekends, please call our after hours Nurse Triage line [phone]. Thank you.

## 2018-07-12 NOTE — LETTER
7/12/2018         RE: Bruno Baig  [address] [facility] [address]        Dear Colleague,    Thank you for referring your patient, Bruno Baig, to the [facility]. Please see a copy of my visit note below.    DATE OF VISIT: Jul 12, 2018    REASON FOR REFERRAL: Management of anemia    CHIEF COMPLAINT:   Chief Complaint   Patient presents with     Hematology     New Patient - Low hemoglobin       HISTORY OF PRESENT ILLNESS:   67-year-old male patient was on routine blood work found to have mild anemia.  Sequently workup was done showing hemoglobin of 10.9 with a normal white count and mildly elevated platelet count at 453.  Ferritin level of 674 serum iron of 25 with iron saturation index of 26.  TSH was 1.47.  Creatinine was normal at 1.2 liver function tests were all normal.  Blood smear showed mild isolated normochromic normocytic anemia without any evidence of hemolysis.  The patient had a colonoscopy in November of last November which came back was diverticulosis with no active bleeding.  Patient continues to feel well without any complaints of shortness of breath or cough or wheezing.  Denies any bruising or bleeding.  He denies any bone aches or pains.    REVIEW OF SYSTEMS:   Constitutional: Negative for fever, chills, and night sweats.  Skin: negative.  Eyes: negative.  Ears/Nose/Throat: negative.  Respiratory: No shortness of breath, dyspnea on exertion, cough, or hemoptysis.  Cardiovascular: negative.  Gastrointestinal: negative.  Genitourinary: negative.  Musculoskeletal: negative.  Neurologic: negative.  Psychiatric: negative.  Hematologic/Lymphatic/Immunologic: negative.  Endocrine: negative.    PAST MEDICAL HISTORY:   No past medical history on file.    PAST SURGICAL HISTORY:   Past Surgical History:   Procedure Laterality Date     COLONOSCOPY N/A 11/9/2017    Procedure: COLONOSCOPY;  colonoscopy;  Surgeon: Justice Horner MD;  Location: [facility]       ALLERGIES:   Allergies as of  "07/12/2018     (No Known Allergies)       MEDICATIONS:   Current Outpatient Prescriptions   Medication Sig Dispense Refill     allopurinol (ZYLOPRIM) 100 MG tablet Take 1 tablet (100 mg) by mouth daily 90 tablet 3     aspirin 81 MG tablet Take by mouth daily       Cholecalciferol (VITAMIN D3) 2000 units CAPS        fish oil-omega-3 fatty acids 1000 MG capsule Take 1 capsule (1 g) by mouth daily       gemfibrozil (LOPID) 600 MG tablet Take 1 tablet (600 mg) by mouth 2 times daily 60 tablet 5     glucosamine-chondroitin 500-400 MG CAPS Take 2 capsules by mouth daily       losartan-hydrochlorothiazide (HYZAAR) 100-12.5 MG per tablet Take 1 tablet by mouth daily 90 tablet 3     blood glucose monitoring (NO BRAND SPECIFIED) meter device kit 1 kit by In Vitro route 2 times daily Use to test blood sugar 2 times daily or as directed. 1 kit 0     blood glucose monitoring (NO BRAND SPECIFIED) test strip Use to test blood sugars 2 times daily or as directed 1 Box 1        FAMILY HISTORY:   Family History   Problem Relation Age of Onset     Other Cancer Mother      lung     Heart Failure Father      Lung Cancer Sister       Family history was reviewed with the patient.    SOCIAL HISTORY:   Social History     Social History     Marital status:      Spouse name: N/A     Number of children: N/A     Years of education: N/A     Social History Main Topics     Smoking status: Current Every Day Smoker     Packs/day: 1.00     Years: 30.00     Types: Cigarettes     Smokeless tobacco: Never Used     Alcohol use Yes      Comment: 5-6 daily     Drug use: No     Sexual activity: Yes     Partners: Female     Other Topics Concern     Parent/Sibling W/ Cabg, Mi Or Angioplasty Before 65f 55m? No     Social History Narrative       PHYSICAL EXAMINATION:   /90 (BP Location: Right arm, Patient Position: Sitting, Cuff Size: Adult Regular)  Pulse 60  Temp 97  F (36.1  C) (Tympanic)  Resp 16  Ht 1.708 m (5' 7.24\")  Wt 82.1 kg (180 lb " 14.4 oz)  SpO2 96%  BMI 28.13 kg/m2  Wt Readings from Last 10 Encounters:   07/12/18 82.1 kg (180 lb 14.4 oz)   07/05/18 82.6 kg (182 lb)   06/18/18 82.3 kg (181 lb 6.4 oz)   11/09/17 81.6 kg (180 lb)   07/24/17 86.2 kg (190 lb)   02/03/17 88.3 kg (194 lb 9.6 oz)   10/18/16 88.3 kg (194 lb 9.6 oz)   10/04/16 87.6 kg (193 lb 3.2 oz)   09/06/16 86.5 kg (190 lb 9.6 oz)   08/16/16 85 kg (187 lb 6.4 oz)      GENERAL APPEARANCE: Healthy, alert and in no acute distress.  HEENT: Sclerae anicteric. PERRLA. Oropharynx without ulcers, lesions, or thrush.  NECK: Supple. No asymmetry or masses.  LYMPHATICS: No palpable cervical, supraclavicular, axillary, or inguinal lymphadenopathy.  RESP: Lungs clear to auscultation bilaterally without rales, rhonchi or wheezes.  CARDIOVASCULAR: Regular rate and rhythm. Normal S1, S2; no S3 or S4. No murmur, gallop, or rub.  ABDOMEN: Soft, nontender. Bowel sounds normal. No palpable organomegaly or masses.  MUSCULOSKELETAL: Extremities without gross deformities noted. No edema of bilateral lower extremities.  SKIN: No suspicious lesions or rashes.  NEURO: Alert and oriented x 3. Cranial nerves II-XII grossly intact.  PSYCHIATRIC: Mentation and affect appear normal.    LABORATORY RESULTS:  Office Visit on 07/05/2018   Component Date Value Ref Range Status     WBC 07/05/2018 6.6  4.0 - 11.0 10e9/L Final     RBC Count 07/05/2018 3.41* 4.4 - 5.9 10e12/L Final     Hemoglobin 07/05/2018 10.9* 13.3 - 17.7 g/dL Final     Hematocrit 07/05/2018 32.8* 40.0 - 53.0 % Final     MCV 07/05/2018 96  78 - 100 fl Final     MCH 07/05/2018 32.0  26.5 - 33.0 pg Final     MCHC 07/05/2018 33.2  31.5 - 36.5 g/dL Final     RDW 07/05/2018 11.6  10.0 - 15.0 % Final     Platelet Count 07/05/2018 453* 150 - 450 10e9/L Final       IMAGING RESULTS:  Recent Results (from the past 744 hour(s))   CT Chest Lung Cancer Scrn Low Dose wo    Narrative    CT CHEST LUNG CANCER  SCREEN LOW DOSE  WITHOUT  6/27/2018 12:22  PM    HISTORY:  Personal history of tobacco use.    TECHNIQUE:  Scans obtained from the apices through the diaphragm  without IV contrast. Low dose CT chest technique was utilized.    COMPARISON:  None.    FINDINGS:  Mild linear atelectasis is suggested at the left lung base.  No enlarged lymph nodes are demonstrated. The thoracic aorta is normal  in caliber.      Impression    IMPRESSION:  No significant pulmonary nodules are demonstrated. ACR  Assessment Category:  Lung-RADS Category 1. Negative, continue annual  screening.    GERTRUDE SEAY MD   US abdominal aorta limited    Narrative    ULTRASOUND ABDOMINAL AORTA IMAGING  6/27/2018 12:52 PM    HISTORY:  Abdominal aortic aneurysm (AAA) without rupture (H).    COMPARISON: 9/11/2017.    FINDINGS: Aneurysmal dilatation of the infrarenal abdominal aorta.  Mild calcification and atherosclerotic changes are noted throughout  the abdominal aorta and iliac arteries. The abdominal aortic  measurements are as follows:    Proximal (suprarenal) aorta: 2.5 cm AP x 2.4 cm transverse.  Mid (infrarenal) aorta:  1.9 cm AP x 1.9 cm transverse.  Distal aorta: 3.3 cm AP x 3.9 cm transverse, previously 3.9 x 3.6 cm.  Right common iliac artery: 1.2 cm.  Left common iliac artery: 1.2 cm.       Impression    IMPRESSION: Aneurysmal dilatation of the infrarenal abdominal aorta  measuring 3.3 x 3.9 cm, not significantly changed.    JENNIFER KRAUSE DO       ASSESSMENT AND PLAN:    (D64.9) Anemia, unspecified type  I reviewed with patient today causes of anemia.  Today I will check CBC with platelets differential, Lactate   Dehydrogenase, Vitamin B12, Folate, ELP reflex  to IELP, Erythropoietin. I explained to the patient that the bone marrow biopsy may be required to reach a diagnosis and to rule out myelodysplastic or marrow infiltrative process.  I reviewed with the patient the rationale of bone marrow biopsy and potential complications.    (I10) Benign essential hypertension  (primary  encounter diagnosis)  Patient currently on Hyzaar 100-12 0.5 mg orally daily.    (E78.5) Hyperlipidemia LDL goal <100  Patient on Lopid 60 mg twice daily    (Z72.0) Tobacco abuse  Facets the importance of quitting smoking.    (M10.9) Acute gout, unspecified cause, unspecified site  Currently on allopurinol 100 mg orally daily.    The patient is ready to learn, no apparent learning barriers were identified, Diagnosis and treatment plans were explained to the patient. The patient expressed understanding of the content. The patient questions were answered to his satisfaction.    Guera Arnold MD    Chart documentation with Dragon Voice recognition Software. Although reviewed after completion, some words and grammatical errors may remain.    Again, thank you for allowing me to participate in the care of your patient.        Sincerely,        Guera Arnold MD

## 2018-07-13 LAB
ALBUMIN SERPL ELPH-MCNC: 4.5 G/DL (ref 3.7–5.1)
ALPHA1 GLOB SERPL ELPH-MCNC: 0.3 G/DL (ref 0.2–0.4)
ALPHA2 GLOB SERPL ELPH-MCNC: 0.9 G/DL (ref 0.5–0.9)
B-GLOBULIN SERPL ELPH-MCNC: 1 G/DL (ref 0.6–1)
EPO SERPL-ACNC: 20 MU/ML (ref 4–27)
GAMMA GLOB SERPL ELPH-MCNC: 0.9 G/DL (ref 0.7–1.6)
M PROTEIN SERPL ELPH-MCNC: 0 G/DL
PROT PATTERN SERPL ELPH-IMP: NORMAL

## 2018-07-20 ENCOUNTER — ONCOLOGY VISIT (OUTPATIENT)
Dept: ONCOLOGY | Facility: CLINIC | Age: 68
End: 2018-07-20
Attending: INTERNAL MEDICINE
Payer: MEDICARE

## 2018-07-20 VITALS
TEMPERATURE: 97.4 F | RESPIRATION RATE: 20 BRPM | SYSTOLIC BLOOD PRESSURE: 134 MMHG | WEIGHT: 179.8 LBS | HEIGHT: 68 IN | OXYGEN SATURATION: 97 % | HEART RATE: 63 BPM | DIASTOLIC BLOOD PRESSURE: 57 MMHG | BODY MASS INDEX: 27.25 KG/M2

## 2018-07-20 DIAGNOSIS — D51.9 ANEMIA DUE TO VITAMIN B12 DEFICIENCY, UNSPECIFIED B12 DEFICIENCY TYPE: Primary | ICD-10-CM

## 2018-07-20 PROCEDURE — 99213 OFFICE O/P EST LOW 20 MIN: CPT | Performed by: INTERNAL MEDICINE

## 2018-07-20 PROCEDURE — G0463 HOSPITAL OUTPT CLINIC VISIT: HCPCS

## 2018-07-20 ASSESSMENT — PAIN SCALES - GENERAL: PAINLEVEL: NO PAIN (0)

## 2018-07-20 NOTE — MR AVS SNAPSHOT
After Visit Summary   7/20/2018    Bruno Baig    MRN: [id number]           Patient Information     Date Of Birth          1950        Visit Information        Provider Department      7/20/2018 10:30 AM Guera Arnold MD [facility] ONCOLOGY      Today's Diagnoses     Anemia due to vitamin B12 deficiency, unspecified B12 deficiency type    -  1      Care Instructions    Dr. Arnold recommends you start taking folic acid (2 mg daily) and vitamin B12 (1000 mcg daily).  These are not prescriptions sent to your pharmacy.  They can be bought over the counter at any pharmacy/department store. We would like to see you back in clinic with Dr. Arnold in 6 weeks with labs prior.      Copy of appointments, and after visit summary (AVS) given to patient.      If you have any questions during business hours (M-F 8 AM- 4PM), please call Judy Chamorro RN, BSN, OCN Oncology Hematology /Breast Cancer Navigator at [facility] [phone].       For questions after business hours, or on holidays/weekends, please call our after hours Nurse Triage line [phone]. Thank you.            Follow-ups after your visit        Follow-up notes from your care team     Return in about 6 weeks (around 8/31/2018) for Blood work before next appointment.      Your next 10 appointments already scheduled     Aug 27, 2018  8:45 AM CDT   LAB with [facility]   [facility] ([facility])    [address] [place] [address] [phone]           Please do not eat 10-12 hours before your appointment if you are coming in fasting for labs on lipids, cholesterol, or glucose (sugar). This does not apply to pregnant women. Water, hot tea and black coffee (with nothing added) are okay. Do not drink other fluids, diet soda or chew gum.            Aug 31, 2018 10:30 AM CDT   Return Visit with Guera Arnold MD   [facility]  "Clinic ([facility])    [facility]  [address] [facility] [address]  [facility] [id number] [phone]              Future tests that were ordered for you today     Open Future Orders        Priority Expected Expires Ordered    CBC with platelets differential Routine 9/5/2018 7/20/2019 7/20/2018    Vitamin B12 Routine 9/5/2018 7/20/2019 7/20/2018    Reticulocyte count Routine 9/5/2018 7/20/2019 7/20/2018            Who to contact     If you have questions or need follow up information about today's clinic visit or your schedule please contact [facility] directly at [phone].  Normal or non-critical lab and imaging results will be communicated to you by SouthPeakhart, letter or phone within 4 business days after the clinic has received the results. If you do not hear from us within 7 days, please contact the clinic through SouthPeakhart or phone. If you have a critical or abnormal lab result, we will notify you by phone as soon as possible.  Submit refill requests through Parametric or call your pharmacy and they will forward the refill request to us. Please allow 3 business days for your refill to be completed.          Additional Information About Your Visit        [facility] Information     Parametric gives you secure access to your electronic health record. If you see a primary care provider, you can also send messages to your care team and make appointments. If you have questions, please call your primary care clinic.  If you do not have a primary care provider, please call [phone] and they will assist you.        Care EveryWhere ID     This is your Care EveryWhere ID. This could be used by other organizations to access your [place] medical records  [id number]        Your Vitals Were     Pulse Temperature Respirations Height Pulse Oximetry BMI (Body Mass Index)    63 97.4  F (36.3  C) (Tympanic) 20 1.721 m (5' 7.75\") 97% 27.54 kg/m2       Blood Pressure from Last 3 Encounters: "   07/20/18 134/57   07/12/18 133/90   07/05/18 112/66    Weight from Last 3 Encounters:   07/20/18 81.6 kg (179 lb 12.8 oz)   07/12/18 82.1 kg (180 lb 14.4 oz)   07/05/18 82.6 kg (182 lb)               Primary Care Provider Office Phone # Fax #    Charisse Lopez [phone] [address] [place] [address]        Equal Access to Services     TIFFANI REED : Hadii aad ku hadasho Soomaali, waaxda luqadaha, qaybta kaalmada adeegyada, waxay idiin hayaan adecharles majano . So [facility] [phone].    ATENCIÓN: Si habla español, tiene a mcintyre disposición servicios gratuitos de asistencia lingüística. [facility] [phone].    We comply with applicable federal civil rights laws and [place] laws. We do not discriminate on the basis of race, color, national origin, age, disability, sex, sexual orientation, or gender identity.            Thank you!     Thank you for choosing [facility] CANCER CLINIC  for your care. Our goal is always to provide you with excellent care. Hearing back from our patients is one way we can continue to improve our services. Please take a few minutes to complete the written survey that you may receive in the mail after your visit with us. Thank you!             Your Updated Medication List - Protect others around you: Learn how to safely use, store and throw away your medicines at www.disposemymeds.org.          This list is accurate as of 7/20/18  1:38 PM.  Always use your most recent med list.                   Brand Name Dispense Instructions for use Diagnosis    allopurinol 100 MG tablet    ZYLOPRIM    90 tablet    Take 1 tablet (100 mg) by mouth daily    Acute gout, unspecified cause, unspecified site       aspirin 81 MG tablet      Take by mouth daily        blood glucose monitoring meter device kit    no brand specified    1 kit    1 kit by In Vitro route 2 times daily Use to test blood sugar 2 times daily or as directed.    Prediabetes       blood glucose  monitoring test strip    no brand specified    1 Box    Use to test blood sugars 2 times daily or as directed    Prediabetes       fish oil-omega-3 fatty acids 1000 MG capsule      Take 1 capsule (1 g) by mouth daily        gemfibrozil 600 MG tablet    LOPID    60 tablet    Take 1 tablet (600 mg) by mouth 2 times daily    Benign essential hypertension       glucosamine-chondroitin 500-400 MG Caps per capsule      Take 2 capsules by mouth daily        losartan-hydrochlorothiazide 100-12.5 MG per tablet    HYZAAR    90 tablet    Take 1 tablet by mouth daily    Benign essential hypertension       vitamin D3 2000 units Caps

## 2018-07-20 NOTE — NURSING NOTE
"Oncology Rooming Note    July 20, 2018 10:51 AM   Bruno Baig is a 67 year old male who presents for:    Chief Complaint   Patient presents with     Hematology     Follow up HGB. Review lab results.      Initial Vitals: /57 (BP Location: Right arm, Patient Position: Sitting, Cuff Size: Adult Regular)  Pulse 63  Temp 97.4  F (36.3  C) (Tympanic)  Resp 20  Ht 1.721 m (5' 7.75\")  Wt 81.6 kg (179 lb 12.8 oz)  SpO2 97%  BMI 27.54 kg/m2 Estimated body mass index is 27.54 kg/(m^2) as calculated from the following:    Height as of this encounter: 1.721 m (5' 7.75\").    Weight as of this encounter: 81.6 kg (179 lb 12.8 oz). Body surface area is 1.97 meters squared.  No Pain (0) Comment: Data Unavailable   No LMP for male patient.  Allergies reviewed: Yes  Medications reviewed: Yes    Medications: Medication refills not needed today.  Pharmacy name entered into [employer]: [id number] [place] [address]    Clinical concerns: Follow up low HGB. Review lab results.     8 minutes for nursing intake (face to face time)     Caitie Sellers CMA            "

## 2018-07-20 NOTE — PATIENT INSTRUCTIONS
Dr. Arnold recommends you start taking folic acid (2 mg daily) and vitamin B12 (1000 mcg daily).  These are not prescriptions sent to your pharmacy.  They can be bought over the counter at any pharmacy/department store. We would like to see you back in clinic with Dr. Arnold in 6 weeks with labs prior.      Copy of appointments, and after visit summary (AVS) given to patient.      If you have any questions during business hours (M-F 8 AM- 4PM), please call Judy Chamorro RN, BSN, OCN Oncology Hematology /Breast Cancer Navigator at [facility] [phone].       For questions after business hours, or on holidays/weekends, please call our after hours Nurse Triage line [phone]. Thank you.

## 2018-07-20 NOTE — LETTER
7/20/2018         RE: Bruno Baig  [address] [facility] [address]        Dear Colleague,    Thank you for referring your patient, Bruno Baig, to the [facility] CANCER [facility]. Please see a copy of my visit note below.    DATE OF VISIT: Jul 20, 2018    Bruno Baig is a 67 year old male is seen today for   Chief Complaint   Patient presents with     Hematology     Follow up HGB. Review lab results.    .       (D51.9) Anemia due to vitamin B12 deficiency, unspecified B12 deficiency type  (primary encounter diagnosis)  I reviewed with the patient today the results of most recent laboratory test.  Patient has mild normocytic anemia with low vitamin B12 levels.  I would recommend patient to start B12 supplements and folic acid 2 mg orally daily.  I am going to see the patient again in 6 weeks time with a repeat lab work.    The patient is ready to learn, no apparent learning barriers were identified.  Diagnosis and treatment plans were explained to the patient. The patient expressed understanding of the content. The patient asked appropriate questions. The patient questions were answered to his satisfaction.  Time spent 15 minutes more than 50% of the time in counseling and coordination of care.  Chart documentation with Dragon Voice recognition Software. Although reviewed after completion, some words and grammatical errors may remain.    Again, thank you for allowing me to participate in the care of your patient.        Sincerely,        Guera Arnold MD

## 2018-07-20 NOTE — PROGRESS NOTES
DATE OF VISIT: Jul 20, 2018    Bruno Baig is a 67 year old male is seen today for   Chief Complaint   Patient presents with     Hematology     Follow up HGB. Review lab results.    .       (D51.9) Anemia due to vitamin B12 deficiency, unspecified B12 deficiency type  (primary encounter diagnosis)  I reviewed with the patient today the results of most recent laboratory test.  Patient has mild normocytic anemia with low vitamin B12 levels.  I would recommend patient to start B12 supplements and folic acid 2 mg orally daily.  I am going to see the patient again in 6 weeks time with a repeat lab work.    The patient is ready to learn, no apparent learning barriers were identified.  Diagnosis and treatment plans were explained to the patient. The patient expressed understanding of the content. The patient asked appropriate questions. The patient questions were answered to his satisfaction.  Time spent 15 minutes more than 50% of the time in counseling and coordination of care.  Chart documentation with Dragon Voice recognition Software. Although reviewed after completion, some words and grammatical errors may remain.

## 2018-08-27 DIAGNOSIS — D51.9 ANEMIA DUE TO VITAMIN B12 DEFICIENCY, UNSPECIFIED B12 DEFICIENCY TYPE: ICD-10-CM

## 2018-08-27 LAB
BASOPHILS # BLD AUTO: 0 10E9/L (ref 0–0.2)
BASOPHILS NFR BLD AUTO: 0.5 %
DIFFERENTIAL METHOD BLD: ABNORMAL
EOSINOPHIL # BLD AUTO: 0.3 10E9/L (ref 0–0.7)
EOSINOPHIL NFR BLD AUTO: 5.5 %
ERYTHROCYTE [DISTWIDTH] IN BLOOD BY AUTOMATED COUNT: 12.2 % (ref 10–15)
HCT VFR BLD AUTO: 36.5 % (ref 40–53)
HGB BLD-MCNC: 12.4 G/DL (ref 13.3–17.7)
LYMPHOCYTES # BLD AUTO: 2.2 10E9/L (ref 0.8–5.3)
LYMPHOCYTES NFR BLD AUTO: 38.7 %
MCH RBC QN AUTO: 32.9 PG (ref 26.5–33)
MCHC RBC AUTO-ENTMCNC: 34 G/DL (ref 31.5–36.5)
MCV RBC AUTO: 97 FL (ref 78–100)
MONOCYTES # BLD AUTO: 0.9 10E9/L (ref 0–1.3)
MONOCYTES NFR BLD AUTO: 15.6 %
NEUTROPHILS # BLD AUTO: 2.2 10E9/L (ref 1.6–8.3)
NEUTROPHILS NFR BLD AUTO: 39.7 %
PLATELET # BLD AUTO: 355 10E9/L (ref 150–450)
RBC # BLD AUTO: 3.77 10E12/L (ref 4.4–5.9)
RETICS # AUTO: 72.2 10E9/L (ref 25–95)
RETICS/RBC NFR AUTO: 1.9 % (ref 0.5–2)
VIT B12 SERPL-MCNC: 332 PG/ML (ref 193–986)
WBC # BLD AUTO: 5.6 10E9/L (ref 4–11)

## 2018-08-27 PROCEDURE — 85025 COMPLETE CBC W/AUTO DIFF WBC: CPT | Performed by: INTERNAL MEDICINE

## 2018-08-27 PROCEDURE — 85045 AUTOMATED RETICULOCYTE COUNT: CPT | Performed by: INTERNAL MEDICINE

## 2018-08-27 PROCEDURE — 36415 COLL VENOUS BLD VENIPUNCTURE: CPT | Performed by: INTERNAL MEDICINE

## 2018-08-27 PROCEDURE — 82607 VITAMIN B-12: CPT | Performed by: INTERNAL MEDICINE

## 2018-08-31 ENCOUNTER — ONCOLOGY VISIT (OUTPATIENT)
Dept: ONCOLOGY | Facility: CLINIC | Age: 68
End: 2018-08-31
Attending: INTERNAL MEDICINE
Payer: MEDICARE

## 2018-08-31 VITALS
SYSTOLIC BLOOD PRESSURE: 154 MMHG | HEIGHT: 67 IN | WEIGHT: 185.2 LBS | BODY MASS INDEX: 29.07 KG/M2 | HEART RATE: 67 BPM | RESPIRATION RATE: 16 BRPM | OXYGEN SATURATION: 97 % | TEMPERATURE: 98.2 F | DIASTOLIC BLOOD PRESSURE: 68 MMHG

## 2018-08-31 DIAGNOSIS — D51.9 ANEMIA DUE TO VITAMIN B12 DEFICIENCY, UNSPECIFIED B12 DEFICIENCY TYPE: Primary | ICD-10-CM

## 2018-08-31 PROCEDURE — 99213 OFFICE O/P EST LOW 20 MIN: CPT | Performed by: INTERNAL MEDICINE

## 2018-08-31 PROCEDURE — G0463 HOSPITAL OUTPT CLINIC VISIT: HCPCS

## 2018-08-31 ASSESSMENT — PAIN SCALES - GENERAL: PAINLEVEL: NO PAIN (0)

## 2018-08-31 NOTE — PROGRESS NOTES
DATE OF VISIT: Aug 31, 2018    Bruno Baig is a 67 year old male is seen today for   Chief Complaint   Patient presents with     Hematology     6 week follow up on low HGB. Review lab results.    .       (D51.9) Anemia due to vitamin B12 deficiency, unspecified B12 deficiency type  (primary encounter diagnosis)  I reviewed with the patient today the results from most recent CBC.  Hemoglobin is gradually improving as well as B12 levels.  The patient symptoms has been improving as well.  I would recommend patient to continue on B12 1000 mcg orally daily definitely.  I have not scheduled the patient for any further appointments I will see him in the future if there are new developments or concerns.    The patient is ready to learn, no apparent learning barriers were identified.  Diagnosis and treatment plans were explained to the patient. The patient expressed understanding of the content. The patient asked appropriate questions. The patient questions were answered to his satisfaction.  Time spent 15 minutes more than 50% of the time in counseling and coordination of care  Chart documentation with Dragon Voice recognition Software. Although reviewed after completion, some words and grammatical errors may remain.

## 2018-08-31 NOTE — NURSING NOTE
"Oncology Rooming Note    August 31, 2018 10:38 AM   Bruno Baig is a 67 year old male who presents for:    Chief Complaint   Patient presents with     Hematology     6 week follow up on low HGB. Review lab results.      Initial Vitals: /68 (BP Location: Right arm, Patient Position: Sitting, Cuff Size: Adult Regular)  Pulse 67  Temp 98.2  F (36.8  C) (Tympanic)  Resp 16  Ht 1.708 m (5' 7.24\")  Wt 84 kg (185 lb 3.2 oz)  SpO2 97%  BMI 28.8 kg/m2 Estimated body mass index is 28.8 kg/(m^2) as calculated from the following:    Height as of this encounter: 1.708 m (5' 7.24\").    Weight as of this encounter: 84 kg (185 lb 3.2 oz). Body surface area is 2 meters squared.  No Pain (0) Comment: Data Unavailable   No LMP for male patient.  Allergies reviewed: Yes  Medications reviewed: Yes    Medications: Medication refills not needed today.  Pharm[facility] into [id number] [place] [address]    Clinical concerns: 6 week follow up on low HGB. Review lab results.     8 minutes for nursing intake (face to face time)     Caitie Sellers CMA            "

## 2018-08-31 NOTE — LETTER
8/31/2018         RE: Bruno Baig  [address] [facility] [address]        Dear Colleague,    Thank you for referring your patient, Bruno Baig, to the [facility]INIC. Please see a copy of my visit note below.    DATE OF VISIT: Aug 31, 2018    Bruno Baig is a 67 year old male is seen today for   Chief Complaint   Patient presents with     Hematology     6 week follow up on low HGB. Review lab results.    .       (D51.9) Anemia due to vitamin B12 deficiency, unspecified B12 deficiency type  (primary encounter diagnosis)  I reviewed with the patient today the results from most recent CBC.  Hemoglobin is gradually improving as well as B12 levels.  The patient symptoms has been improving as well.  I would recommend patient to continue on B12 1000 mcg orally daily definitely.  I have not scheduled the patient for any further appointments I will see him in the future if there are new developments or concerns.    The patient is ready to learn, no apparent learning barriers were identified.  Diagnosis and treatment plans were explained to the patient. The patient expressed understanding of the content. The patient asked appropriate questions. The patient questions were answered to his satisfaction.  Time spent 15 minutes more than 50% of the time in counseling and coordination of care  Chart documentation with Dragon Voice recognition Software. Although reviewed after completion, some words and grammatical errors may remain.    Again, thank you for allowing me to participate in the care of your patient.        Sincerely,        Guera Arnold MD

## 2018-08-31 NOTE — MR AVS SNAPSHOT
After Visit Summary   8/31/2018    Bruno Baig    MRN: [id number]           Patient Information     Date Of Birth          1950        Visit Information        Provider Department      8/31/2018 10:30 AM Guera Arnold MD [facility] ONCOLOGY      Care Instructions    We would like you to follow up with Dr. Arnold if you have worsening symptoms or fail to improve. No future appointments are scheduled at this time.     Copy of appointments, and after visit summary (AVS) given to patient.      If you have any questions during business hours (M-F 8 AM- 4PM), please call Nasima Vogel RN Oncology Hematology  at [facility] [phone].       For questions after business hours, or on holidays/weekends, please call our after hours Nurse Triage line [phone]. Thank you.]            Follow-ups after your visit        Follow-up notes from your care team     Return if symptoms worsen or fail to improve.      Who to contact     If you have questions or need follow up information about today's clinic visit or your schedule please contact [facility] directly at [phone].  Normal or non-critical lab and imaging results will be communicated to you by [employer], letter or phone within 4 business days after the clinic has received the results. If you do not hear from us within 7 days, please contact the clinic through [employer] or phone. If you have a critical or abnormal lab result, we will notify you by phone as soon as possible.  Submit refill requests through [employer] or call your pharmacy and they will forward the refill request to us. Please allow 3 business days for your refill to be completed.          Additional Information About Your Visit        MyChart Information     [employer] gives you secure access to your electronic health record. If you see a primary care provider, you can also send messages to your care team and make  "appointments. If you have questions, please call your primary care clinic.  If you do not have a primary care provider, please call [phone] and they will assist you.        Care EveryWhere ID     This is your Care EveryWhere ID. This could be used by other organizations to access your [place] medical records  [id number]        Your Vitals Were     Pulse Temperature Respirations Height Pulse Oximetry BMI (Body Mass Index)    67 98.2  F (36.8  C) (Tympanic) 16 1.708 m (5' 7.24\") 97% 28.8 kg/m2       Blood Pressure from Last 3 Encounters:   08/31/18 154/68   07/20/18 134/57   07/12/18 133/90    Weight from Last 3 Encounters:   08/31/18 84 kg (185 lb 3.2 oz)   07/20/18 81.6 kg (179 lb 12.8 oz)   07/12/18 82.1 kg (180 lb 14.4 oz)              Today, you had the following     No orders found for display       Primary Care Provider Office Phone # Fax #    Charisse Lopez [phone] [address] [place], [place] [address]        Equal Access to Services     [facility] : Hadii aida atkinson hadloo Soshey, waaxda luqadaha, qaybta kaalmada leorada, handy gallardo. So [facility] [phone].    ATENCIÓN: Si habla español, tiene a mcintyre disposición servicios gratuitos de asistencia lingüística. [facility] [phone].    We comply with applicable federal civil rights laws and [place] laws. We do not discriminate on the basis of race, color, national origin, age, disability, sex, sexual orientation, or gender identity.            Thank you!     Thank you for choosing [facility] CANCER [facility]  for your care. Our goal is always to provide you with excellent care. Hearing back from our patients is one way we can continue to improve our services. Please take a few minutes to complete the written survey that you may receive in the mail after your visit with us. Thank you!             Your Updated Medication List - Protect others around you: Learn how to safely use, store and throw away " your medicines at www.disposemymeds.org.          This list is accurate as of 8/31/18 10:56 AM.  Always use your most recent med list.                   Brand Name Dispense Instructions for use Diagnosis    allopurinol 100 MG tablet    ZYLOPRIM    90 tablet    Take 1 tablet (100 mg) by mouth daily    Acute gout, unspecified cause, unspecified site       aspirin 81 MG tablet      Take by mouth daily        B-12 1000 MCG Caps      Take 1 capsule by mouth        blood glucose monitoring meter device kit    no brand specified    1 kit    1 kit by In Vitro route 2 times daily Use to test blood sugar 2 times daily or as directed.    Prediabetes       blood glucose monitoring test strip    no brand specified    1 Box    Use to test blood sugars 2 times daily or as directed    Prediabetes       fish oil-omega-3 fatty acids 1000 MG capsule      Take 1 capsule (1 g) by mouth daily        FOLIC ACID PO      Take 2,000 mcg by mouth daily        gemfibrozil 600 MG tablet    LOPID    60 tablet    Take 1 tablet (600 mg) by mouth 2 times daily    Benign essential hypertension       glucosamine-chondroitin 500-400 MG Caps per capsule      Take 2 capsules by mouth daily        losartan-hydrochlorothiazide 100-12.5 MG per tablet    HYZAAR    90 tablet    Take 1 tablet by mouth daily    Benign essential hypertension       vitamin D3 2000 units Caps

## 2018-08-31 NOTE — PATIENT INSTRUCTIONS
We would like you to follow up with Dr. Arnold if you have worsening symptoms or fail to improve. No future appointments are scheduled at this time.     Copy of appointments, and after visit summary (AVS) given to patient.      If you have any questions during business hours (M-F 8 AM- 4PM), please call Nasima Vogel RN Oncology Hematology  at [facility] [phone].       For questions after business hours, or on holidays/weekends, please call our after hours Nurse Triage line [phone]. Thank you.]

## 2018-11-05 ENCOUNTER — TELEPHONE (OUTPATIENT)
Dept: FAMILY MEDICINE | Facility: CLINIC | Age: 68
End: 2018-11-05

## 2018-11-05 NOTE — TELEPHONE ENCOUNTER
Patient is calling as he had an assessment done by his medical plan [employer] and the report he got says they had some concerns about poor circulation in his legs. He states the report was suppose to be sent to Dr. Lopez as well and he is calling now to see if she got the report and what follow up is needed. Please advise.    Kenia [facility] [place]

## 2018-11-05 NOTE — TELEPHONE ENCOUNTER
Spoke with Bruno, I do not see a copy of the report here. He will drop it off for Dr. Lopez to review and advise

## 2018-12-22 DIAGNOSIS — M10.9 ACUTE GOUT, UNSPECIFIED CAUSE, UNSPECIFIED SITE: ICD-10-CM

## 2018-12-23 NOTE — TELEPHONE ENCOUNTER
"Requested Prescriptions   Pending Prescriptions Disp Refills     allopurinol (ZYLOPRIM) 100 MG tablet   Last Written Prescription Date:  1/2/18  Last Fill Quantity: 90,  # refills: 3   Last office visit: 6/18/2018 with prescribing provider:  RADHA Lopez   Future Office Visit:     90 tablet 3     Sig: TAKE 1 TABLET (100 MG) BY MOUTH DAILY    Gout Agents Protocol Passed - 12/22/2018  8:39 AM       Passed - CBC on file in past 12 months    Recent Labs   Lab Test 08/27/18  0842   WBC 5.6   RBC 3.77*   HGB 12.4*   HCT 36.5*                   Passed - ALT on file in past 12 months    Recent Labs   Lab Test 06/18/18  0948   ALT 14            Passed - Has Uric Acid on file in past 12 months and value is less than 6    Recent Labs   Lab Test 06/18/18  0948   URIC 5.4     If level is 6mg/dL or greater, ok to refill one time and refer to provider.          Passed - Recent (12 mo) or future (30 days) visit within the authorizing provider's specialty    Patient had office visit in the last 12 months or has a visit in the next 30 days with authorizing provider or within the authorizing provider's specialty.  See \"Patient Info\" tab in inbasket, or \"Choose Columns\" in Meds & Orders section of the refill encounter.             Passed - Patient is age 18 or older       Passed - Normal serum creatinine on file in the past 12 months    Recent Labs   Lab Test 06/18/18  0948   CR 1.20               "

## 2018-12-24 RX ORDER — ALLOPURINOL 100 MG/1
100 TABLET ORAL DAILY
Qty: 90 TABLET | Refills: 3 | Status: SHIPPED | OUTPATIENT
Start: 2018-12-24 | End: 2019-06-05

## 2019-01-22 ENCOUNTER — OFFICE VISIT (OUTPATIENT)
Dept: FAMILY MEDICINE | Facility: CLINIC | Age: 69
End: 2019-01-22
Payer: COMMERCIAL

## 2019-01-22 VITALS
HEART RATE: 63 BPM | WEIGHT: 186.6 LBS | TEMPERATURE: 97 F | OXYGEN SATURATION: 97 % | BODY MASS INDEX: 28.28 KG/M2 | SYSTOLIC BLOOD PRESSURE: 138 MMHG | HEIGHT: 68 IN | DIASTOLIC BLOOD PRESSURE: 70 MMHG

## 2019-01-22 DIAGNOSIS — R73.03 PRE-DIABETES: ICD-10-CM

## 2019-01-22 DIAGNOSIS — D51.9 ANEMIA DUE TO VITAMIN B12 DEFICIENCY, UNSPECIFIED B12 DEFICIENCY TYPE: ICD-10-CM

## 2019-01-22 DIAGNOSIS — M79.661 PAIN IN BOTH LOWER LEGS: ICD-10-CM

## 2019-01-22 DIAGNOSIS — I10 BENIGN ESSENTIAL HYPERTENSION: Primary | ICD-10-CM

## 2019-01-22 DIAGNOSIS — M79.662 PAIN IN BOTH LOWER LEGS: ICD-10-CM

## 2019-01-22 LAB
ANION GAP SERPL CALCULATED.3IONS-SCNC: 5 MMOL/L (ref 3–14)
BASOPHILS # BLD AUTO: 0 10E9/L (ref 0–0.2)
BASOPHILS NFR BLD AUTO: 0.4 %
BUN SERPL-MCNC: 34 MG/DL (ref 7–30)
CALCIUM SERPL-MCNC: 10.1 MG/DL (ref 8.5–10.1)
CHLORIDE SERPL-SCNC: 104 MMOL/L (ref 94–109)
CO2 SERPL-SCNC: 26 MMOL/L (ref 20–32)
CREAT SERPL-MCNC: 1.36 MG/DL (ref 0.66–1.25)
DIFFERENTIAL METHOD BLD: ABNORMAL
EOSINOPHIL # BLD AUTO: 0.3 10E9/L (ref 0–0.7)
EOSINOPHIL NFR BLD AUTO: 4.1 %
ERYTHROCYTE [DISTWIDTH] IN BLOOD BY AUTOMATED COUNT: 11.9 % (ref 10–15)
GFR SERPL CREATININE-BSD FRML MDRD: 52 ML/MIN/{1.73_M2}
GLUCOSE SERPL-MCNC: 126 MG/DL (ref 70–99)
HBA1C MFR BLD: 5.7 % (ref 0–5.6)
HCT VFR BLD AUTO: 37 % (ref 40–53)
HGB BLD-MCNC: 12.6 G/DL (ref 13.3–17.7)
LYMPHOCYTES # BLD AUTO: 2 10E9/L (ref 0.8–5.3)
LYMPHOCYTES NFR BLD AUTO: 28.7 %
MCH RBC QN AUTO: 32.6 PG (ref 26.5–33)
MCHC RBC AUTO-ENTMCNC: 34.1 G/DL (ref 31.5–36.5)
MCV RBC AUTO: 96 FL (ref 78–100)
MONOCYTES # BLD AUTO: 0.9 10E9/L (ref 0–1.3)
MONOCYTES NFR BLD AUTO: 12.7 %
NEUTROPHILS # BLD AUTO: 3.8 10E9/L (ref 1.6–8.3)
NEUTROPHILS NFR BLD AUTO: 54.1 %
PLATELET # BLD AUTO: 312 10E9/L (ref 150–450)
POTASSIUM SERPL-SCNC: 4.6 MMOL/L (ref 3.4–5.3)
RBC # BLD AUTO: 3.87 10E12/L (ref 4.4–5.9)
SODIUM SERPL-SCNC: 135 MMOL/L (ref 133–144)
VIT B12 SERPL-MCNC: 502 PG/ML (ref 193–986)
WBC # BLD AUTO: 7.1 10E9/L (ref 4–11)

## 2019-01-22 PROCEDURE — 99214 OFFICE O/P EST MOD 30 MIN: CPT | Performed by: FAMILY MEDICINE

## 2019-01-22 PROCEDURE — 80048 BASIC METABOLIC PNL TOTAL CA: CPT | Performed by: FAMILY MEDICINE

## 2019-01-22 PROCEDURE — 36415 COLL VENOUS BLD VENIPUNCTURE: CPT | Performed by: FAMILY MEDICINE

## 2019-01-22 PROCEDURE — 85025 COMPLETE CBC W/AUTO DIFF WBC: CPT | Performed by: FAMILY MEDICINE

## 2019-01-22 PROCEDURE — 83036 HEMOGLOBIN GLYCOSYLATED A1C: CPT | Performed by: FAMILY MEDICINE

## 2019-01-22 PROCEDURE — 82607 VITAMIN B-12: CPT | Performed by: FAMILY MEDICINE

## 2019-01-22 RX ORDER — GEMFIBROZIL 600 MG/1
TABLET, FILM COATED ORAL
Qty: 180 TABLET | Refills: 3 | Status: CANCELLED | OUTPATIENT
Start: 2019-01-22

## 2019-01-22 RX ORDER — LOSARTAN POTASSIUM AND HYDROCHLOROTHIAZIDE 12.5; 1 MG/1; MG/1
1 TABLET ORAL DAILY
Qty: 90 TABLET | Refills: 3 | Status: CANCELLED | OUTPATIENT
Start: 2019-01-22

## 2019-01-22 ASSESSMENT — ENCOUNTER SYMPTOMS
PALPITATIONS: 0
ABDOMINAL PAIN: 0
HEMATURIA: 0
MYALGIAS: 0
COUGH: 0
FEVER: 0
CONSTIPATION: 0
CHILLS: 0
PARESTHESIAS: 0
HEARTBURN: 0
HEADACHES: 0
FREQUENCY: 0
ARTHRALGIAS: 0
SORE THROAT: 0
JOINT SWELLING: 0
DYSURIA: 0
HEMATOCHEZIA: 0
NERVOUS/ANXIOUS: 0
WEAKNESS: 0
DIZZINESS: 0
DIARRHEA: 0
SHORTNESS OF BREATH: 1
EYE PAIN: 0
NAUSEA: 0

## 2019-01-22 ASSESSMENT — MIFFLIN-ST. JEOR: SCORE: 1572.97

## 2019-01-22 ASSESSMENT — ACTIVITIES OF DAILY LIVING (ADL): CURRENT_FUNCTION: NO ASSISTANCE NEEDED

## 2019-01-22 NOTE — PROGRESS NOTES
"  SUBJECTIVE:3   Bruno Baig is a 68 year old male who presents to clinic today for the following health issues:      Hypertension Follow-up      Outpatient blood pressures are not being checked.    Low Salt Diet: no added salt    Pre diabetes  Checks BS 2 hours after meals and highest has been 160s usually in the 110s      Musculoskeletal problem/pain      Duration: years and getting worse     Description  Location: bilateral leg pain worse with activity and then rests and is better in minutes  He notes this with more strenuous activity     Intensity:  moderate    Accompanying signs and symptoms: none    History  Previous similar problem: no   Previous evaluation:  None, he did have a screening vascular test that was abnl     Precipitating or alleviating factors:  Trauma or overuse: no   Aggravating factors include: strenuous activity     Therapies tried and outcome: rest/inactivity      Anemia   On B12 and feels good     Problem list and histories reviewed & adjusted, as indicated.  Additional history: as documented    Labs reviewed in EPIC    Reviewed and updated as needed this visit by clinical staff  Tobacco  Allergies  Meds  Problems  Med Hx  Surg Hx  Fam Hx  Soc Hx        Reviewed and updated as needed this visit by Provider  Tobacco  Allergies  Meds  Problems  Med Hx  Surg Hx  Fam Hx         ROS:  Constitutional, HEENT, cardiovascular, pulmonary, gi and gu systems are negative, except as otherwise noted.    OBJECTIVE:                                                    /70 (BP Location: Right arm, Patient Position: Chair, Cuff Size: Adult Large)   Pulse 63   Temp 97  F (36.1  C) (Tympanic)   Ht 1.715 m (5' 7.5\")   Wt 84.6 kg (186 lb 9.6 oz)   SpO2 97%   BMI 28.79 kg/m     Body mass index is 28.79 kg/m .  GENERAL APPEARANCE: healthy, alert and no distress  RESP: lungs clear to auscultation - no rales, rhonchi or wheezes  CV: regular rates and rhythm, normal S1 S2, no S3 or S4 and " no murmur, click or rub  MS: extremities normal- no gross deformities noted  Good DP pulse and cap refill is normal   No edema   PSYCH: mentation appears normal and affect normal/bright         ASSESSMENT/PLAN:                                                    1. Benign essential hypertension  Stable no change in treatment plan.     2. Pre-diabetes  Continue with diet and resources discussed   - Basic metabolic panel  - Hemoglobin A1c    3. Anemia due to vitamin B12 deficiency, unspecified B12 deficiency type  Adjust meds as indicated by above labs.   - CBC with platelets differential  - Vitamin B12    4. Pain in both lower legs    - US ANDREY Doppler with Exercise Bilateral; Future      Patient Instructions   Set up US to assess blood flow in the legs   Labs today   Goal for blood sugars after you eat is <140  May consider [facility] website for diet info on prediabetes or lo carb diet   Recheck with me in June for wellness visit and I will be in touch with the above results   Risks, benefits, side effects and rationale for treatment plan fully discussed with the patient and understanding expressed.     Charisse Lopez MD  [facility]

## 2019-01-22 NOTE — NURSING NOTE
"Chief Complaint   Patient presents with     Physical       Initial /70 (BP Location: Right arm, Patient Position: Chair, Cuff Size: Adult Large)   Pulse 63   Temp 97  F (36.1  C) (Tympanic)   Ht 1.715 m (5' 7.5\")   Wt 84.6 kg (186 lb 9.6 oz)   SpO2 97%   BMI 28.79 kg/m   Estimated body mass index is 28.79 kg/m  as calculated from the following:    Height as of this encounter: 1.715 m (5' 7.5\").    Weight as of this encounter: 84.6 kg (186 lb 9.6 oz).    Patient presents to the clinic using No DME    Health Maintenance that is potentially due pending provider review:  NONE    n/a    Is there anyone who you would like to be able to receive your results? No  If yes have patient fill out RADHA    "

## 2019-01-22 NOTE — PATIENT INSTRUCTIONS
Set up US to assess blood flow in the legs   Labs today   Goal for blood sugars after you eat is <140  May consider [facility] website for diet info on prediabetes or lo carb diet   Recheck with me in June for wellness visit and I will be in touch with the above results

## 2019-01-24 ENCOUNTER — HOSPITAL ENCOUNTER (OUTPATIENT)
Dept: ULTRASOUND IMAGING | Facility: CLINIC | Age: 69
Discharge: HOME | End: 2019-01-24
Attending: FAMILY MEDICINE | Admitting: FAMILY MEDICINE
Payer: COMMERCIAL

## 2019-01-24 DIAGNOSIS — M79.662 PAIN IN BOTH LOWER LEGS: ICD-10-CM

## 2019-01-24 DIAGNOSIS — I10 BENIGN ESSENTIAL HYPERTENSION: Primary | ICD-10-CM

## 2019-01-24 DIAGNOSIS — I73.9 PAD (PERIPHERAL ARTERY DISEASE) (H): ICD-10-CM

## 2019-01-24 DIAGNOSIS — M79.661 PAIN IN BOTH LOWER LEGS: ICD-10-CM

## 2019-01-24 PROCEDURE — 93924 LWR XTR VASC STDY BILAT: CPT

## 2019-01-29 DIAGNOSIS — I10 BENIGN ESSENTIAL HYPERTENSION: ICD-10-CM

## 2019-01-29 LAB
ANION GAP SERPL CALCULATED.3IONS-SCNC: 6 MMOL/L (ref 3–14)
BUN SERPL-MCNC: 31 MG/DL (ref 7–30)
CALCIUM SERPL-MCNC: 10.4 MG/DL (ref 8.5–10.1)
CHLORIDE SERPL-SCNC: 102 MMOL/L (ref 94–109)
CO2 SERPL-SCNC: 29 MMOL/L (ref 20–32)
CREAT SERPL-MCNC: 1.51 MG/DL (ref 0.66–1.25)
GFR SERPL CREATININE-BSD FRML MDRD: 46 ML/MIN/{1.73_M2}
GLUCOSE SERPL-MCNC: 120 MG/DL (ref 70–99)
POTASSIUM SERPL-SCNC: 4.4 MMOL/L (ref 3.4–5.3)
SODIUM SERPL-SCNC: 137 MMOL/L (ref 133–144)

## 2019-01-29 PROCEDURE — 36415 COLL VENOUS BLD VENIPUNCTURE: CPT | Performed by: FAMILY MEDICINE

## 2019-01-29 PROCEDURE — 80048 BASIC METABOLIC PNL TOTAL CA: CPT | Performed by: FAMILY MEDICINE

## 2019-01-30 ENCOUNTER — TELEPHONE (OUTPATIENT)
Dept: OTHER | Facility: CLINIC | Age: 69
End: 2019-01-30

## 2019-01-30 NOTE — TELEPHONE ENCOUNTER
Pt referred to [facility] by Charisse Lopez MD  for PAD and claudication.     Patient has ANDREY in EPIC on 1/24/19.     Pt needs to be scheduled for consult with vascular medicine.  Will route to scheduling to coordinate an appointment at next available.    JAILYN VillarrealN, RN

## 2019-02-06 DIAGNOSIS — I10 BENIGN ESSENTIAL HYPERTENSION: ICD-10-CM

## 2019-02-06 DIAGNOSIS — R79.89 ELEVATED SERUM CREATININE: Primary | ICD-10-CM

## 2019-02-06 LAB
ANION GAP SERPL CALCULATED.3IONS-SCNC: 5 MMOL/L (ref 3–14)
BUN SERPL-MCNC: 31 MG/DL (ref 7–30)
CALCIUM SERPL-MCNC: 10 MG/DL (ref 8.5–10.1)
CHLORIDE SERPL-SCNC: 106 MMOL/L (ref 94–109)
CO2 SERPL-SCNC: 26 MMOL/L (ref 20–32)
CREAT SERPL-MCNC: 1.34 MG/DL (ref 0.66–1.25)
GFR SERPL CREATININE-BSD FRML MDRD: 53 ML/MIN/{1.73_M2}
GLUCOSE SERPL-MCNC: 126 MG/DL (ref 70–99)
POTASSIUM SERPL-SCNC: 4.6 MMOL/L (ref 3.4–5.3)
SODIUM SERPL-SCNC: 137 MMOL/L (ref 133–144)

## 2019-02-06 PROCEDURE — 36415 COLL VENOUS BLD VENIPUNCTURE: CPT | Performed by: FAMILY MEDICINE

## 2019-02-06 PROCEDURE — 80048 BASIC METABOLIC PNL TOTAL CA: CPT | Performed by: FAMILY MEDICINE

## 2019-02-19 ENCOUNTER — OFFICE VISIT (OUTPATIENT)
Dept: VASCULAR SURGERY | Facility: CLINIC | Age: 69
End: 2019-02-19
Payer: COMMERCIAL

## 2019-02-19 VITALS — SYSTOLIC BLOOD PRESSURE: 142 MMHG | HEART RATE: 64 BPM | RESPIRATION RATE: 16 BRPM | DIASTOLIC BLOOD PRESSURE: 67 MMHG

## 2019-02-19 DIAGNOSIS — I73.9 PAD (PERIPHERAL ARTERY DISEASE) (H): Primary | ICD-10-CM

## 2019-02-19 PROCEDURE — 99204 OFFICE O/P NEW MOD 45 MIN: CPT | Performed by: SURGERY

## 2019-02-19 NOTE — NURSING NOTE
"Initial /67 (BP Location: Left arm, Patient Position: Chair, Cuff Size: Adult Regular)   Pulse 64   Resp 16  Estimated body mass index is 28.79 kg/m  as calculated from the following:    Height as of 1/22/19: 1.715 m (5' 7.5\").    Weight as of 1/22/19: 84.6 kg (186 lb 9.6 oz). .    Patient is here for a consult for PAD.  josie del toro LPN    "

## 2019-02-19 NOTE — PROGRESS NOTES
Mr. Baig is a 68-year-old male who we had been asked to see in consultation for lower extremity pain.  He reports that the pain started around his hip joints and goes down to his thighs.  He denies any typical symptoms of arterial claudication in terms of involvement of feet or calves.  He does not report any nocturnal pain.  Pain is always associated with walking and relieved by standing.    Past medical history is positive for anemia due to vitamin B12 deficiency, history of gout, hyperlipidemia, hypertension and a known small abdominal aortic aneurysm.    Patient has not had any major surgeries.    He smokes about half a pack of cigarettes per day.    Review of systems is not as noted in history of presenting illness.    On examination: He appears comfortable and is in no acute distress.  Vital signs are reviewed.  HEENT: Head is Normocephalic, mucosa pink.  Eyes: Extraocular motions are intact, sclerae are anicteric.  Mental: Alert and oriented x4, judgment insight are good.  Cardiac: Irregular rate and rhythm, S1 plus S2 +0.  Chest: Clear to auscultation bilaterally.  Abdomen: Soft and nontender.    Abdomen obese.  Vascular: No carotid bruits, 3+ bilateral radial pulses, 3+ bilateral femoral pulses.    Both posterior tibial signals are triphasic.  Right dorsalis pedis is monophasic.  Left dorsalis pedis is triphasic.    ULTRASOUND ANDREY DOPPLER WITH EXERCISE BILATERAL   1/24/2019 4:56 PM      HISTORY: Pain in both lower legs.     COMPARISON: None.     FINDINGS:  Right ANDREY: 0.94.  Left ANDREY: 0.84.  Right first digital pressure: 0.46  Left first digital pressure: 0.34     Waveforms: Triphasic bilaterally in the femoral, popliteal and  posterior tibial arteries. Left dorsalis pedis artery is triphasic.  Right dorsalis pedis waveform is monophasic. Digital waveforms are  slightly dampened.     Exercise: Patient walked on a treadmill for 5 minutes at a 12% incline  at 0.8 miles per hour with no symptoms.     Right  exercise ANDREY: 0.97.  Left exercise ANDREY: 0.80.                                                                      IMPRESSION:   1. Right ANDREY shows mild arterial insufficiency which normalizes with  exercise.  2. Left resting and exercise ABIs show moderate arterial  insufficiency.  3. Digital pressures are abnormal bilaterally.     ANDREY CRITERIA:  >0.95 Normal  0.90 - 0.94 Mild  0.5 - 0.89 Moderate  0.2 - 0.49 Severe  <0.2 Critical     JENNIFER KRAUSE DO    Diagnosis: Asymptomatic bilateral lower extremity mild peripheral arterial disease.    Plan: I explained to him that his clinical history, physical exam and noninvasive studies do not suggest that the lower extremity discomfort that is coming from his hips and into his thigh is due to arterial insufficiency.  I have close counseled him on smoking cessation.  For his small abdominal aortic aneurysm he can undergo aortic ultrasound every 2 years.  He can follow-up as needed

## 2019-02-20 DIAGNOSIS — R79.89 ELEVATED SERUM CREATININE: ICD-10-CM

## 2019-02-20 LAB
ANION GAP SERPL CALCULATED.3IONS-SCNC: 4 MMOL/L (ref 3–14)
BUN SERPL-MCNC: 23 MG/DL (ref 7–30)
CALCIUM SERPL-MCNC: 9.9 MG/DL (ref 8.5–10.1)
CHLORIDE SERPL-SCNC: 104 MMOL/L (ref 94–109)
CO2 SERPL-SCNC: 27 MMOL/L (ref 20–32)
CREAT SERPL-MCNC: 1.14 MG/DL (ref 0.66–1.25)
GFR SERPL CREATININE-BSD FRML MDRD: 65 ML/MIN/{1.73_M2}
GLUCOSE SERPL-MCNC: 98 MG/DL (ref 70–99)
POTASSIUM SERPL-SCNC: 4.3 MMOL/L (ref 3.4–5.3)
SODIUM SERPL-SCNC: 135 MMOL/L (ref 133–144)

## 2019-02-20 PROCEDURE — 36415 COLL VENOUS BLD VENIPUNCTURE: CPT | Performed by: FAMILY MEDICINE

## 2019-02-20 PROCEDURE — 80048 BASIC METABOLIC PNL TOTAL CA: CPT | Performed by: FAMILY MEDICINE

## 2019-06-04 DIAGNOSIS — I10 BENIGN ESSENTIAL HYPERTENSION: ICD-10-CM

## 2019-06-04 RX ORDER — GEMFIBROZIL 600 MG/1
TABLET, FILM COATED ORAL
Qty: 180 TABLET | Refills: 1 | Status: SHIPPED | OUTPATIENT
Start: 2019-06-04 | End: 2019-07-01

## 2019-06-04 NOTE — TELEPHONE ENCOUNTER
"Requested Prescriptions   Pending Prescriptions Disp Refills     gemfibrozil (LOPID) 600 MG tablet 180 tablet 1       Fibrates Passed - 6/4/2019 10:09 AM        Passed - Lipid panel on file in past 12 months     Recent Labs   Lab Test 06/18/18  0948   CHOL 204*   TRIG 180*   HDL 34*   *   NHDL 170*               Passed - No abnormal creatine kinase in past 12 months     No lab results found.             Passed - Recent (12 mo) or future (30 days) visit within the authorizing provider's specialty     Patient had office visit in the last 12 months or has a visit in the next 30 days with authorizing provider or within the authorizing provider's specialty.  See \"Patient Info\" tab in inbasket, or \"Choose Columns\" in Meds & Orders section of the refill encounter.              Passed - Medication is active on med list        Passed - Patient is age 18 or older        Last Written Prescription Date:  11/26/18  Last Fill Quantity: 180,  # refills: 1   Last office visit: 1/22/2019 with prescribing provider:  John   Future Office Visit:      "

## 2019-06-05 ENCOUNTER — APPOINTMENT (OUTPATIENT)
Dept: CT IMAGING | Facility: CLINIC | Age: 69
End: 2019-06-05
Attending: EMERGENCY MEDICINE
Payer: COMMERCIAL

## 2019-06-05 ENCOUNTER — HOSPITAL ENCOUNTER (EMERGENCY)
Facility: CLINIC | Age: 69
Discharge: HOME | End: 2019-06-05
Attending: EMERGENCY MEDICINE | Admitting: EMERGENCY MEDICINE
Payer: COMMERCIAL

## 2019-06-05 VITALS
HEART RATE: 63 BPM | OXYGEN SATURATION: 96 % | RESPIRATION RATE: 11 BRPM | WEIGHT: 179 LBS | DIASTOLIC BLOOD PRESSURE: 72 MMHG | BODY MASS INDEX: 28.09 KG/M2 | TEMPERATURE: 98.2 F | SYSTOLIC BLOOD PRESSURE: 137 MMHG | HEIGHT: 67 IN

## 2019-06-05 DIAGNOSIS — R10.32 ABDOMINAL PAIN, LEFT LOWER QUADRANT: ICD-10-CM

## 2019-06-05 DIAGNOSIS — I71.40 ABDOMINAL AORTIC ANEURYSM (AAA) WITHOUT RUPTURE (H): ICD-10-CM

## 2019-06-05 DIAGNOSIS — K92.1 BLACK TARRY STOOLS: ICD-10-CM

## 2019-06-05 DIAGNOSIS — K57.32 DIVERTICULITIS OF COLON: ICD-10-CM

## 2019-06-05 LAB
ABO + RH BLD: NORMAL
ABO + RH BLD: NORMAL
ALBUMIN SERPL-MCNC: 4.2 G/DL (ref 3.4–5)
ALP SERPL-CCNC: 59 U/L (ref 40–150)
ALT SERPL W P-5'-P-CCNC: 14 U/L (ref 0–70)
ANION GAP SERPL CALCULATED.3IONS-SCNC: 4 MMOL/L (ref 3–14)
AST SERPL W P-5'-P-CCNC: 18 U/L (ref 0–45)
BASE DEFICIT BLDV-SCNC: 1.1 MMOL/L
BASOPHILS # BLD AUTO: 0 10E9/L (ref 0–0.2)
BASOPHILS NFR BLD AUTO: 0.7 %
BILIRUB SERPL-MCNC: 0.4 MG/DL (ref 0.2–1.3)
BLD GP AB SCN SERPL QL: NORMAL
BLOOD BANK CMNT PATIENT-IMP: NORMAL
BUN SERPL-MCNC: 35 MG/DL (ref 7–30)
CALCIUM SERPL-MCNC: 10.3 MG/DL (ref 8.5–10.1)
CHLORIDE SERPL-SCNC: 106 MMOL/L (ref 94–109)
CO2 SERPL-SCNC: 26 MMOL/L (ref 20–32)
CREAT SERPL-MCNC: 1.51 MG/DL (ref 0.66–1.25)
DIFFERENTIAL METHOD BLD: ABNORMAL
EOSINOPHIL # BLD AUTO: 0.2 10E9/L (ref 0–0.7)
EOSINOPHIL NFR BLD AUTO: 3.8 %
ERYTHROCYTE [DISTWIDTH] IN BLOOD BY AUTOMATED COUNT: 11.7 % (ref 10–15)
GFR SERPL CREATININE-BSD FRML MDRD: 46 ML/MIN/{1.73_M2}
GLUCOSE SERPL-MCNC: 102 MG/DL (ref 70–99)
HCO3 BLDV-SCNC: 24 MMOL/L (ref 21–28)
HCT VFR BLD AUTO: 36.5 % (ref 40–53)
HEMOCCULT STL QL: NORMAL
HGB BLD-MCNC: 12.1 G/DL (ref 13.3–17.7)
IMM GRANULOCYTES # BLD: 0 10E9/L (ref 0–0.4)
IMM GRANULOCYTES NFR BLD: 0.2 %
INTERNAL QC OK POCT: YES
LACTATE BLD-SCNC: 0.7 MMOL/L (ref 0.7–2)
LYMPHOCYTES # BLD AUTO: 2.2 10E9/L (ref 0.8–5.3)
LYMPHOCYTES NFR BLD AUTO: 37.3 %
MCH RBC QN AUTO: 31.6 PG (ref 26.5–33)
MCHC RBC AUTO-ENTMCNC: 33.2 G/DL (ref 31.5–36.5)
MCV RBC AUTO: 95 FL (ref 78–100)
MONOCYTES # BLD AUTO: 0.9 10E9/L (ref 0–1.3)
MONOCYTES NFR BLD AUTO: 14.5 %
NEUTROPHILS # BLD AUTO: 2.6 10E9/L (ref 1.6–8.3)
NEUTROPHILS NFR BLD AUTO: 43.5 %
NRBC # BLD AUTO: 0 10*3/UL
NRBC BLD AUTO-RTO: 0 /100
O2/TOTAL GAS SETTING VFR VENT: ABNORMAL %
PCO2 BLDV: 39 MM HG (ref 40–50)
PH BLDV: 7.39 PH (ref 7.32–7.43)
PLATELET # BLD AUTO: 398 10E9/L (ref 150–450)
PO2 BLDV: 39 MM HG (ref 25–47)
POTASSIUM SERPL-SCNC: 4.4 MMOL/L (ref 3.4–5.3)
PROT SERPL-MCNC: 8.5 G/DL (ref 6.8–8.8)
RBC # BLD AUTO: 3.83 10E12/L (ref 4.4–5.9)
SODIUM SERPL-SCNC: 136 MMOL/L (ref 133–144)
SPECIMEN EXP DATE BLD: NORMAL
TEST CARD LOT NUMBER: NORMAL
WBC # BLD AUTO: 6 10E9/L (ref 4–11)

## 2019-06-05 PROCEDURE — 83605 ASSAY OF LACTIC ACID: CPT | Performed by: EMERGENCY MEDICINE

## 2019-06-05 PROCEDURE — 85025 COMPLETE CBC W/AUTO DIFF WBC: CPT | Performed by: EMERGENCY MEDICINE

## 2019-06-05 PROCEDURE — 96360 HYDRATION IV INFUSION INIT: CPT | Mod: 59 | Performed by: EMERGENCY MEDICINE

## 2019-06-05 PROCEDURE — 80053 COMPREHEN METABOLIC PANEL: CPT | Performed by: EMERGENCY MEDICINE

## 2019-06-05 PROCEDURE — 25000128 H RX IP 250 OP 636: Performed by: EMERGENCY MEDICINE

## 2019-06-05 PROCEDURE — 86850 RBC ANTIBODY SCREEN: CPT | Performed by: EMERGENCY MEDICINE

## 2019-06-05 PROCEDURE — 86900 BLOOD TYPING SEROLOGIC ABO: CPT | Performed by: EMERGENCY MEDICINE

## 2019-06-05 PROCEDURE — 86901 BLOOD TYPING SEROLOGIC RH(D): CPT | Performed by: EMERGENCY MEDICINE

## 2019-06-05 PROCEDURE — 99285 EMERGENCY DEPT VISIT HI MDM: CPT | Mod: Z6 | Performed by: EMERGENCY MEDICINE

## 2019-06-05 PROCEDURE — 82272 OCCULT BLD FECES 1-3 TESTS: CPT | Performed by: EMERGENCY MEDICINE

## 2019-06-05 PROCEDURE — 82803 BLOOD GASES ANY COMBINATION: CPT | Performed by: EMERGENCY MEDICINE

## 2019-06-05 PROCEDURE — 96361 HYDRATE IV INFUSION ADD-ON: CPT | Performed by: EMERGENCY MEDICINE

## 2019-06-05 PROCEDURE — 74177 CT ABD & PELVIS W/CONTRAST: CPT

## 2019-06-05 PROCEDURE — 25000125 ZZHC RX 250: Performed by: EMERGENCY MEDICINE

## 2019-06-05 PROCEDURE — 99285 EMERGENCY DEPT VISIT HI MDM: CPT | Mod: 25 | Performed by: EMERGENCY MEDICINE

## 2019-06-05 RX ORDER — IOPAMIDOL 755 MG/ML
88 INJECTION, SOLUTION INTRAVASCULAR ONCE
Status: COMPLETED | OUTPATIENT
Start: 2019-06-05 | End: 2019-06-05

## 2019-06-05 RX ORDER — SODIUM CHLORIDE 9 MG/ML
1000 INJECTION, SOLUTION INTRAVENOUS CONTINUOUS
Status: DISCONTINUED | OUTPATIENT
Start: 2019-06-05 | End: 2019-06-05 | Stop reason: HOSPADM

## 2019-06-05 RX ORDER — SODIUM CHLORIDE, SODIUM LACTATE, POTASSIUM CHLORIDE, CALCIUM CHLORIDE 600; 310; 30; 20 MG/100ML; MG/100ML; MG/100ML; MG/100ML
1000 INJECTION, SOLUTION INTRAVENOUS CONTINUOUS
Status: DISCONTINUED | OUTPATIENT
Start: 2019-06-05 | End: 2019-06-05 | Stop reason: HOSPADM

## 2019-06-05 RX ADMIN — SODIUM CHLORIDE 1000 ML: 9 INJECTION, SOLUTION INTRAVENOUS at 14:11

## 2019-06-05 RX ADMIN — IOPAMIDOL 88 ML: 755 INJECTION, SOLUTION INTRAVENOUS at 15:59

## 2019-06-05 RX ADMIN — SODIUM CHLORIDE 62 ML: 9 INJECTION, SOLUTION INTRAVENOUS at 15:59

## 2019-06-05 ASSESSMENT — ENCOUNTER SYMPTOMS
CONSTITUTIONAL NEGATIVE: 1
PSYCHIATRIC NEGATIVE: 1
CARDIOVASCULAR NEGATIVE: 1
RESPIRATORY NEGATIVE: 1
NEUROLOGICAL NEGATIVE: 1
MUSCULOSKELETAL NEGATIVE: 1
EYES NEGATIVE: 1
ENDOCRINE NEGATIVE: 1
HEMATOLOGIC/LYMPHATIC NEGATIVE: 1
BLOOD IN STOOL: 1
ALLERGIC/IMMUNOLOGIC NEGATIVE: 1
ABDOMINAL PAIN: 1

## 2019-06-05 ASSESSMENT — MIFFLIN-ST. JEOR: SCORE: 1530.57

## 2019-06-05 NOTE — ED AVS SNAPSHOT
[facility] Department  [address] [address]  Phone:  [phone]  Fax:  [phone]                                    Bruno Baig   MRN: [id number]    Department:  [facility] Emergency Department   Date of Visit:  6/5/2019           After Visit Summary Signature Page    I have received my discharge instructions, and my questions have been answered. I have discussed any challenges I see with this plan with the nurse or doctor.    ..........................................................................................................................................  Patient/Patient Representative Signature      ..........................................................................................................................................  Patient Representative Print Name and Relationship to Patient    ..................................................               ................................................  Date                                   Time    ..........................................................................................................................................  Reviewed by Signature/Title    ...................................................              ..............................................  Date                                               Time          22EPIC Rev 08/18

## 2019-06-05 NOTE — DISCHARGE INSTRUCTIONS
1) Your presentation and evaluation in the emergency department revealed mild diverticulitis involving the descending colon.  Your work-up did not suggest a life-threatening bleeding process however it is important to be watchful for this.  If your stools become grossly bloody or you pass large clots you should return to the emergency department for further care.     2) your case and evaluation was reviewed with the general surgeon who also performed you colonoscopy in November 2017.  You are discharged home with oral antibiotics to treat mild diverticulitis over the next 1 week    3) Common and serious side effects with the antibiotics prescribed has been reviewed. Please see additional details in the handout provided.    4) if your symptoms worsen including fever, increasing abdominal pain or bloody stools please return to the emergency department for reevaluation and additional care.    5) We have also reviewed your history of abdominal aortic aneurysm and follow-up care.

## 2019-06-05 NOTE — ED PROVIDER NOTES
History     Chief Complaint   Patient presents with     Abdominal Pain     llq pain, no n/v/d, no fevers, dark/black stools     HPI  Bruno Baig is a 68 year old male arrived by private car with his wife for fashion for 3-day history of left-sided abdominal pain more prominent in the left mid and left lower abdomen.  Reports he has had black tarry stools in the last 2 to 3 days.  He is reporting no fever no chills.  No vomiting.  No recent foreign travel no recent antibiotic use in the last 3 months by his report.  He is on a baby aspirin and takes gemfibrozil and Hyzaar.  Because of black stools and increasing abdominal pain he arrived for further care.  He had a colonoscopy in 2017.  No other sick household contacts and his spouse reports no symptoms.    Allergies:  No Known Allergies    Problem List:    Patient Active Problem List    Diagnosis Date Noted     Anemia due to vitamin B12 deficiency, unspecified B12 deficiency type 07/20/2018     Priority: Medium     Advanced directives, counseling/discussion 07/24/2017     Priority: Medium     Advance Care Planning 7/24/2017: ACP Review of Chart / Resources Provided:  Reviewed chart for advance care plan.  Bruno Baig has no plan or code status on file. Discussed available resources. Patient declines information at this time.   Added by Radha Corona             Hyperlipidemia LDL goal <100 07/24/2017     Priority: Medium     Tobacco abuse 07/24/2017     Priority: Medium     Refuses tetanus, diphtheria, and acellular pertussis (Tdap) vaccination 07/24/2017     Priority: Medium     Pneumococcal vaccination declined by patient 07/24/2017     Priority: Medium     Abdominal aortic aneurysm (AAA) without rupture (H) 03/10/2017     Priority: Medium     3.9 cm on US March 10, 2017        Pre-diabetes 10/18/2016     Priority: Medium     Benign essential hypertension 09/06/2016     Priority: Medium     Acute gout, unspecified cause, unspecified site 08/16/2016      Priority: Medium        Past Medical History:    No past medical history on file.    Past Surgical History:    Past Surgical History:   Procedure Laterality Date     COLONOSCOPY N/A 11/9/2017    Procedure: COLONOSCOPY;  colonoscopy;  Surgeon: Justice Horner MD;  Location: [facility]       Family History:    Family History   Problem Relation Age of Onset     Other Cancer Mother         lung     Heart Failure Father      Lung Cancer Sister        Social History:  Marital Status:   [2]  Social History     Tobacco Use     Smoking status: Current Every Day Smoker     Packs/day: 1.00     Years: 30.00     Pack years: 30.00     Types: Cigarettes     Smokeless tobacco: Never Used     Tobacco comment: 1/2 pack daily    Substance Use Topics     Alcohol use: Yes     Comment: 5-6 daily     Drug use: No        Medications:      amoxicillin-clavulanate (AUGMENTIN) 875-125 MG tablet   aspirin 81 MG tablet   Cholecalciferol (VITAMIN D3) 2000 units CAPS   Cyanocobalamin (B-12) 1000 MCG CAPS   fish oil-omega-3 fatty acids 1000 MG capsule   Garlic 1000 MG CAPS   gemfibrozil (LOPID) 600 MG tablet   glucosamine-chondroitin 500-400 MG CAPS   losartan-hydrochlorothiazide (HYZAAR) 100-12.5 MG per tablet   blood glucose monitoring (NO BRAND SPECIFIED) meter device kit   blood glucose monitoring (NO BRAND SPECIFIED) test strip         Review of Systems   Constitutional: Negative.    HENT: Negative.    Eyes: Negative.    Respiratory: Negative.    Cardiovascular: Negative.    Gastrointestinal: Positive for abdominal pain (left sided over the last 3 days) and blood in stool (black tarry stools).   Endocrine: Negative.    Genitourinary: Negative.    Musculoskeletal: Negative.    Skin: Negative.    Allergic/Immunologic: Negative.    Neurological: Negative.    Hematological: Negative.    Psychiatric/Behavioral: Negative.    All other systems reviewed and are negative.      Physical Exam   BP: 133/72  Pulse: 64  Heart Rate: 60  Temp: 98.2  F (36.8  " C)  Resp: 15  Height: 170.2 cm (5' 7\")  Weight: 81.2 kg (179 lb)  SpO2: 97 %      Physical Exam   Constitutional: He is oriented to person, place, and time. He appears well-developed and well-nourished. No distress.   HENT:   Head: Normocephalic and atraumatic.   Eyes: Pupils are equal, round, and reactive to light. EOM are normal. Right eye exhibits no discharge. Left eye exhibits no discharge. No scleral icterus.   Neck: Normal range of motion. Neck supple. No JVD present. No tracheal deviation present. No thyromegaly present.   Cardiovascular: Normal rate. Exam reveals no gallop and no friction rub.   No murmur heard.  Pulmonary/Chest: Effort normal and breath sounds normal. No stridor. No respiratory distress. He has no wheezes. He has no rales. He exhibits no tenderness.   Abdominal: Soft. Bowel sounds are normal. There is tenderness (voluntary) in the left lower quadrant. There is guarding.       Genitourinary: Rectal exam shows guaiac negative stool (negative).   Musculoskeletal: Normal range of motion. He exhibits no edema, tenderness or deformity.   Lymphadenopathy:     He has no cervical adenopathy.   Neurological: He is alert and oriented to person, place, and time. He displays normal reflexes. No cranial nerve deficit or sensory deficit. He exhibits normal muscle tone. Coordination normal.   Skin: Skin is warm. Capillary refill takes less than 2 seconds. No rash noted. He is not diaphoretic. No pallor.   Psychiatric: He has a normal mood and affect. His behavior is normal. Judgment and thought content normal.       ED Course        Procedures               Critical Care time:  none                 ED medications:  Medications   0.9% sodium chloride BOLUS (1,000 mLs Intravenous New Bag 6/5/19 1411)     Followed by   sodium chloride 0.9% infusion (has no administration in time range)   lactated ringers BOLUS 1,000 mL (has no administration in time range)   lactated ringers infusion (has no administration " in time range)   iopamidol (ISOVUE-370) solution 88 mL (88 mLs Intravenous Given 6/5/19 1559)   sodium chloride 0.9 % bag 500mL for CT scan flush use (62 mLs Intravenous Given 6/5/19 1559)       ED labs and imaging:  Results for orders placed or performed during the hospital encounter of 06/05/19   CT Abdomen Pelvis w Contrast    Narrative    CT ABDOMEN AND PELVIS WITH CONTRAST  6/5/2019 4:08 PM    HISTORY: Three day history of left lower quadrant abdominal pain with  a history of diverticulosis. The current concern is for diverticulitis  or other acute abnormality.    COMPARISON: None.    TECHNIQUE: Routine transverse CT imaging of the abdomen and pelvis was  performed following the uneventful administration of 88 mL Isovue -370  intravenous contrast. Radiation dose for this scan was reduced using  automated exposure control, adjustment of the mA and/or kV according  to patient size, or iterative reconstruction technique.    FINDINGS: The visualized lung bases are clear. The liver, spleen,  pancreas, gallbladder, and adrenal glands are normal. There is a 1.2  cm cyst in the anterior aspect of the right kidney. The kidneys are  otherwise unremarkable. No bladder pathology is seen. No enlarged  lymph node or other abnormal mass is demonstrated. No free fluid is  seen. No free intraperitoneal gas is identified. There are numerous  diverticula of the sigmoid and descending colon. There is mild  inflammation of the mesenteric fat adjacent to the junction of the  descending and sigmoid colon and also in the midportion of the  descending colon. No other gastrointestinal tract abnormality is  demonstrated. There is a normal-appearing appendix. There is  calcification in the vascular structures. There is a 3.7 cm diameter  infrarenal abdominal aortic aneurysm. There are degenerative changes  in the spine. No other osseous abnormality is seen. No abdominal or  pelvic wall pathology is demonstrated.       Impression     IMPRESSION:   1. Mild diverticulitis of the descending colon. There is no evidence  of perforation or an abscess.  2. There is a 3.7 cm diameter infrarenal abdominal aortic aneurysm.    SIM VERONICA MD   CBC with platelets differential   Result Value Ref Range    WBC 6.0 4.0 - 11.0 10e9/L    RBC Count 3.83 (L) 4.4 - 5.9 10e12/L    Hemoglobin 12.1 (L) 13.3 - 17.7 g/dL    Hematocrit 36.5 (L) 40.0 - 53.0 %    MCV 95 78 - 100 fl    MCH 31.6 26.5 - 33.0 pg    MCHC 33.2 31.5 - 36.5 g/dL    RDW 11.7 10.0 - 15.0 %    Platelet Count 398 150 - 450 10e9/L    Diff Method Automated Method     % Neutrophils 43.5 %    % Lymphocytes 37.3 %    % Monocytes 14.5 %    % Eosinophils 3.8 %    % Basophils 0.7 %    % Immature Granulocytes 0.2 %    Nucleated RBCs 0 0 /100    Absolute Neutrophil 2.6 1.6 - 8.3 10e9/L    Absolute Lymphocytes 2.2 0.8 - 5.3 10e9/L    Absolute Monocytes 0.9 0.0 - 1.3 10e9/L    Absolute Eosinophils 0.2 0.0 - 0.7 10e9/L    Absolute Basophils 0.0 0.0 - 0.2 10e9/L    Abs Immature Granulocytes 0.0 0 - 0.4 10e9/L    Absolute Nucleated RBC 0.0    Comprehensive metabolic panel   Result Value Ref Range    Sodium 136 133 - 144 mmol/L    Potassium 4.4 3.4 - 5.3 mmol/L    Chloride 106 94 - 109 mmol/L    Carbon Dioxide 26 20 - 32 mmol/L    Anion Gap 4 3 - 14 mmol/L    Glucose 102 (H) 70 - 99 mg/dL    Urea Nitrogen 35 (H) 7 - 30 mg/dL    Creatinine 1.51 (H) 0.66 - 1.25 mg/dL    GFR Estimate 46 (L) >60 mL/min/[1.73_m2]    GFR Estimate If Black 53 (L) >60 mL/min/[1.73_m2]    Calcium 10.3 (H) 8.5 - 10.1 mg/dL    Bilirubin Total 0.4 0.2 - 1.3 mg/dL    Albumin 4.2 3.4 - 5.0 g/dL    Protein Total 8.5 6.8 - 8.8 g/dL    Alkaline Phosphatase 59 40 - 150 U/L    ALT 14 0 - 70 U/L    AST 18 0 - 45 U/L   Lactic acid whole blood   Result Value Ref Range    Lactic Acid 0.7 0.7 - 2.0 mmol/L   Blood gas venous   Result Value Ref Range    Ph Venous 7.39 7.32 - 7.43 pH    PCO2 Venous 39 (L) 40 - 50 mm Hg    PO2 Venous 39 25 - 47 mm Hg     "Bicarbonate Venous 24 21 - 28 mmol/L    Base Deficit Venous 1.1 mmol/L    FIO2 96RA    Occult blood stool POCT   Result Value Ref Range    Occult Blood neg neg    Internal QC OK Yes     Test Card Lot Number [id number]    ABO/Rh type and screen   Result Value Ref Range    ABO A     RH(D) Pos     Antibody Screen Neg     Test Valid Only At [facility]        Specimen Expires 06/08/2019          ED vitals:  Vitals:    06/05/19 1353 06/05/19 1430 06/05/19 1445 06/05/19 1500   BP: 133/72 131/61 126/63 121/57   Pulse: 64 59 57 55   Resp:  15 13    Temp: 98.2  F (36.8  C)      TempSrc: Oral      SpO2: 97% 96% 95% 94%   Weight: 81.2 kg (179 lb)      Height: 1.702 m (5' 7\")        Prior or recent diagnostic imaging and work-up:  Colonoscopy- 11/9/2017  Findings:        The perianal and digital rectal examinations were normal.        Multiple medium-mouthed diverticula were found in the entire colon.        The exam was otherwise normal throughout the examined colon.        There is no endoscopic evidence of mass or polyps in the entire colon.        The retroflexed view of the distal rectum and anal verge was normal and        showed no anal or rectal abnormalities.                                                                                     Impression:          - Diverticulosis in the entire examined colon.                        - The distal rectum and anal verge are normal on                        retroflexion view.                        - No specimens collected.   Recommendation:      - Discharge patient to home.                        - High fiber diet.                        - Repeat colonoscopy in 10 years for screening purposes.                                                                                       Signed electronically by Justice Horner M.D.   _________________   Justice Horner MD   11/9/2017 10:45:23 AM   I was physically present for the entire viewing portion of the exam. "   [id number] Horner MD   Number of Addenda: 0     ULTRASOUND ABDOMINAL AORTA IMAGING  6/27/2018 12:52 PM     HISTORY:  Abdominal aortic aneurysm (AAA) without rupture (H).     COMPARISON: 9/11/2017.     FINDINGS: Aneurysmal dilatation of the infrarenal abdominal aorta.  Mild calcification and atherosclerotic changes are noted throughout  the abdominal aorta and iliac arteries. The abdominal aortic  measurements are as follows:     Proximal (suprarenal) aorta: 2.5 cm AP x 2.4 cm transverse.  Mid (infrarenal) aorta:  1.9 cm AP x 1.9 cm transverse.  Distal aorta: 3.3 cm AP x 3.9 cm transverse, previously 3.9 x 3.6 cm.  Right common iliac artery: 1.2 cm.  Left common iliac artery: 1.2 cm.                                                                       IMPRESSION: Aneurysmal dilatation of the infrarenal abdominal aorta  measuring 3.3 x 3.9 cm, not significantly changed.     JENNIFER KRAUSE, DO  Assessments & Plan (with Medical Decision Making)   Clinical impression: 68-year-old male who arrives for a 3-day history of left lower quadrant abdominal pain and report of black stools .  Symptoms suspicious for acute diverticulitis.  Stable GI bleed is not completely excluded,  Follow-up care is suggested.    Patient has multiple medical problems including a history of hyperlipidemia, hypertension, gout, tobacco abuse.  He reports similar history about 5 years ago he admits to alcohol intake at least 4-5 times per week.  Patient arrived by private car with his wife stating over the last 3 days is developed increasing pain in the left mid and left lower abdomen with episodes of black tarry stool.  No vomiting.  No nausea.  Last meal was yesterday.  Did have blueberry toast this morning.  No fever or chills.  No report of recent antibiotic use in the last 3 months.  Currently on a baby aspirin, omeprazole, Hyzaar.  On my exam he appeared in no acute distress he was hemodynamically normal.  Localized tenderness in the  left mid and left lower abdomen with some rebound but no guarding.  Normal bowel sounds in all 4 quadrants.  Protuberant abdomen.  Chaperoned rectal exam with Carla Chavarria RN-normal rectal tone, no no fissure, no external hemorrhoids.  Green stool in the vault.  Guaiac negative.      ED course and Plan:  I reviewed his medical records including prior diagnostic evaluation and work-up.  He had a colonoscopy in November 2017 that showed diverticulosis throughout the entire colon.  We discussed and reviewed differential diagnosis for his symptoms as reported with 3 days of left-sided abdominal pain and black tarry stool by report.  Reviewed colitis ischemic versus infectious versus inflammatory, we also discussed diverticulitis versus diverticulosis and discussed and reviewed GI bleeding.  Blood work was obtained he was given fluids and CT imaging was obtained to exclude acute intra-abdominal catastrophe including colitis or diverticulitis.    Work-up today revealed normal lactate.  Patient is not acidotic.  Normal white count.  There was a delay in results of lab studies due to a glitch in the system.  CT imaging today revealed mild diverticulitis involving the descending colon.  Incidentally noted by the interpreting radiologist is a 3.7 cm infrarenal aortic aneurysm.  See detailed report above. (No significant change from Imaging (Ultrasound from 6/27/18)     With patient's report of black tarry stool although he is guaiac negative here in the emergency department I reviewed his presentation with the general surgeon on duty.  I spoke with Dr. SERENA Horner- on-duty general surgeon at 4.30PM.  Dr. Horner performed patient's last colonoscopy.  He felt he was appropriate to discharge the patient home for diverticulitis with plan to consider an upper endoscopy with black tarry stools continue.    Imaging results were reviewed with the patient and spouse.  He is discharged home with Augmentin twice daily for 7 days-to aid  with ease of compliance with monotherapy.    I reviewed reasons to return to the emergency department for care with the patient and his wife including but not limited to fever, with persistent black tarry stools that become grossly bloody or passage of blood clots, fainting or any new concerns.    Patient  is to call the general surgery clinic to schedule an upper endoscopy as a follow-up based on recommendation and discussion with the general surgeon on duty.    During patient's visit in the emergency department he remained hemodynamically stable was afebrile and his pain and discomfort was minimal.        Disclaimer: This note consists of symbols derived from keyboarding, dictation and/or voice recognition software. As a result, there may be errors in the script that have gone undetected. Please consider this when interpreting information found in this chart.  I have reviewed the nursing notes.    I have reviewed the findings, diagnosis, plan and need for follow up with the patient.          Medication List      Started    amoxicillin-clavulanate 875-125 MG tablet  Commonly known as:  AUGMENTIN  1 tablet, Oral, 2 TIMES DAILY            Final diagnoses:   Abdominal pain, left lower quadrant - Over the last 3 days likely due to mild diverticulitis based on imaging today   Black tarry stools - With report of 3-day history of left mid and lower quadrant abdominal pain.  Guaiac negative in the emergency department   Diverticulitis of colon - Mild disease noted on CT imaging today   Abdominal aortic aneurysm (AAA) without rupture (H) - Imaging today obtained to evaluate for diverticulitis showed an aneurysm (3.7cm). Known aneursym- Stable (last done 6/27/18)       6/5/2019   [facility] EMERGENCY DEPARTMENT     Tomi Hernandez MD  06/05/19 3518

## 2019-06-13 ENCOUNTER — OFFICE VISIT (OUTPATIENT)
Dept: FAMILY MEDICINE | Facility: CLINIC | Age: 69
End: 2019-06-13
Payer: COMMERCIAL

## 2019-06-13 VITALS
HEART RATE: 69 BPM | BODY MASS INDEX: 29.29 KG/M2 | WEIGHT: 186.6 LBS | OXYGEN SATURATION: 96 % | TEMPERATURE: 96.9 F | RESPIRATION RATE: 16 BRPM | DIASTOLIC BLOOD PRESSURE: 60 MMHG | HEIGHT: 67 IN | SYSTOLIC BLOOD PRESSURE: 112 MMHG

## 2019-06-13 DIAGNOSIS — K57.32 DIVERTICULITIS OF COLON: Primary | ICD-10-CM

## 2019-06-13 PROCEDURE — 99213 OFFICE O/P EST LOW 20 MIN: CPT | Performed by: FAMILY MEDICINE

## 2019-06-13 ASSESSMENT — MIFFLIN-ST. JEOR: SCORE: 1565.04

## 2019-06-13 NOTE — PROGRESS NOTES
"Subjective     Bruno Baig is a 68 year old male who presents to clinic today for the following health issues:    HPI   ED/UC Followup:    Facility:  [place]  Date of visit: 6/5/2019  Reason for visit: Diverticulitis  Current Status: doing much better today       s :Bruno Baig is a 68 year old male with diverticulitis.  Improved on augmentin.  Mild on CT scan.     No fever, no pain, no malaise.  Back to normal    Thinks maybe he had t his in past as well    Problem list, med list, additional histories reviewed and updated, as indicated.        1.5 yrs ago colonoscopy fine.     O:/60 (BP Location: Right arm, Patient Position: Chair, Cuff Size: Adult Large)   Pulse 69   Temp 96.9  F (36.1  C) (Tympanic)   Resp 16   Ht 1.702 m (5' 7\")   Wt 84.6 kg (186 lb 9.6 oz)   SpO2 96%   BMI 29.23 kg/m    GEN: Alert and oriented, in no acute distress  ABD: nontender.  No distention or mass appreciated.    A: diverticulitis, improved    P: high fiber diet, healthy eating.  F/u if sx recur.        "

## 2019-06-13 NOTE — NURSING NOTE
"No chief complaint on file.      Initial /60 (BP Location: Right arm, Patient Position: Chair, Cuff Size: Adult Large)   Pulse 69   Temp 96.9  F (36.1  C) (Tympanic)   Resp 16   Ht 1.702 m (5' 7\")   Wt 84.6 kg (186 lb 9.6 oz)   SpO2 96%   BMI 29.23 kg/m   Estimated body mass index is 29.23 kg/m  as calculated from the following:    Height as of this encounter: 1.702 m (5' 7\").    Weight as of this encounter: 84.6 kg (186 lb 9.6 oz).    Patient presents to the clinic using No DME    Health Maintenance that is potentially due pending provider review:  NONE    Lisa Hill MA  9:47 AM 6/13/2019  .        "

## 2019-07-01 ENCOUNTER — OFFICE VISIT (OUTPATIENT)
Dept: FAMILY MEDICINE | Facility: CLINIC | Age: 69
End: 2019-07-01
Payer: COMMERCIAL

## 2019-07-01 ENCOUNTER — ANCILLARY PROCEDURE (OUTPATIENT)
Dept: GENERAL RADIOLOGY | Facility: CLINIC | Age: 69
End: 2019-07-01
Attending: FAMILY MEDICINE
Payer: COMMERCIAL

## 2019-07-01 VITALS
HEIGHT: 67 IN | BODY MASS INDEX: 29 KG/M2 | DIASTOLIC BLOOD PRESSURE: 70 MMHG | WEIGHT: 184.8 LBS | RESPIRATION RATE: 16 BRPM | TEMPERATURE: 97.4 F | SYSTOLIC BLOOD PRESSURE: 138 MMHG | HEART RATE: 59 BPM | OXYGEN SATURATION: 98 %

## 2019-07-01 DIAGNOSIS — M25.512 CHRONIC LEFT SHOULDER PAIN: ICD-10-CM

## 2019-07-01 DIAGNOSIS — I10 BENIGN ESSENTIAL HYPERTENSION: ICD-10-CM

## 2019-07-01 DIAGNOSIS — M10.9 ACUTE GOUT, UNSPECIFIED CAUSE, UNSPECIFIED SITE: ICD-10-CM

## 2019-07-01 DIAGNOSIS — R73.03 PRE-DIABETES: ICD-10-CM

## 2019-07-01 DIAGNOSIS — N18.30 CKD (CHRONIC KIDNEY DISEASE) STAGE 3, GFR 30-59 ML/MIN (H): ICD-10-CM

## 2019-07-01 DIAGNOSIS — Z87.891 PERSONAL HISTORY OF TOBACCO USE: ICD-10-CM

## 2019-07-01 DIAGNOSIS — D51.9 ANEMIA DUE TO VITAMIN B12 DEFICIENCY, UNSPECIFIED B12 DEFICIENCY TYPE: ICD-10-CM

## 2019-07-01 DIAGNOSIS — Z00.00 ENCOUNTER FOR MEDICARE ANNUAL WELLNESS EXAM: Primary | ICD-10-CM

## 2019-07-01 DIAGNOSIS — G89.29 CHRONIC LEFT SHOULDER PAIN: ICD-10-CM

## 2019-07-01 LAB
ALBUMIN SERPL-MCNC: 4 G/DL (ref 3.4–5)
ALP SERPL-CCNC: 57 U/L (ref 40–150)
ALT SERPL W P-5'-P-CCNC: 15 U/L (ref 0–70)
ANION GAP SERPL CALCULATED.3IONS-SCNC: 7 MMOL/L (ref 3–14)
AST SERPL W P-5'-P-CCNC: 20 U/L (ref 0–45)
BASOPHILS # BLD AUTO: 0 10E9/L (ref 0–0.2)
BASOPHILS NFR BLD AUTO: 0.3 %
BILIRUB DIRECT SERPL-MCNC: 0.1 MG/DL (ref 0–0.2)
BILIRUB SERPL-MCNC: 0.4 MG/DL (ref 0.2–1.3)
BUN SERPL-MCNC: 31 MG/DL (ref 7–30)
CALCIUM SERPL-MCNC: 10.6 MG/DL (ref 8.5–10.1)
CHLORIDE SERPL-SCNC: 104 MMOL/L (ref 94–109)
CHOLEST SERPL-MCNC: 226 MG/DL
CO2 SERPL-SCNC: 23 MMOL/L (ref 20–32)
CREAT SERPL-MCNC: 1.58 MG/DL (ref 0.66–1.25)
DIFFERENTIAL METHOD BLD: ABNORMAL
EOSINOPHIL # BLD AUTO: 0.2 10E9/L (ref 0–0.7)
EOSINOPHIL NFR BLD AUTO: 2.5 %
ERYTHROCYTE [DISTWIDTH] IN BLOOD BY AUTOMATED COUNT: 11.6 % (ref 10–15)
GFR SERPL CREATININE-BSD FRML MDRD: 43 ML/MIN/{1.73_M2}
GLUCOSE SERPL-MCNC: 97 MG/DL (ref 70–99)
HBA1C MFR BLD: 5.6 % (ref 0–5.6)
HCT VFR BLD AUTO: 35.7 % (ref 40–53)
HDLC SERPL-MCNC: 41 MG/DL
HGB BLD-MCNC: 11.9 G/DL (ref 13.3–17.7)
LDLC SERPL CALC-MCNC: 116 MG/DL
LYMPHOCYTES # BLD AUTO: 2.3 10E9/L (ref 0.8–5.3)
LYMPHOCYTES NFR BLD AUTO: 35 %
MCH RBC QN AUTO: 31.7 PG (ref 26.5–33)
MCHC RBC AUTO-ENTMCNC: 33.3 G/DL (ref 31.5–36.5)
MCV RBC AUTO: 95 FL (ref 78–100)
MONOCYTES # BLD AUTO: 0.9 10E9/L (ref 0–1.3)
MONOCYTES NFR BLD AUTO: 14.3 %
NEUTROPHILS # BLD AUTO: 3.1 10E9/L (ref 1.6–8.3)
NEUTROPHILS NFR BLD AUTO: 47.9 %
NONHDLC SERPL-MCNC: 185 MG/DL
PLATELET # BLD AUTO: 337 10E9/L (ref 150–450)
POTASSIUM SERPL-SCNC: 4.7 MMOL/L (ref 3.4–5.3)
PROT SERPL-MCNC: 8.1 G/DL (ref 6.8–8.8)
RBC # BLD AUTO: 3.75 10E12/L (ref 4.4–5.9)
SODIUM SERPL-SCNC: 134 MMOL/L (ref 133–144)
TRIGL SERPL-MCNC: 345 MG/DL
TSH SERPL DL<=0.005 MIU/L-ACNC: 2.47 MU/L (ref 0.4–4)
URATE SERPL-MCNC: 5.3 MG/DL (ref 3.5–7.2)
VIT B12 SERPL-MCNC: 484 PG/ML (ref 193–986)
WBC # BLD AUTO: 6.4 10E9/L (ref 4–11)

## 2019-07-01 PROCEDURE — 80061 LIPID PANEL: CPT | Performed by: FAMILY MEDICINE

## 2019-07-01 PROCEDURE — 73030 X-RAY EXAM OF SHOULDER: CPT | Mod: LT

## 2019-07-01 PROCEDURE — 99214 OFFICE O/P EST MOD 30 MIN: CPT | Mod: 25 | Performed by: FAMILY MEDICINE

## 2019-07-01 PROCEDURE — G0296 VISIT TO DETERM LDCT ELIG: HCPCS | Performed by: FAMILY MEDICINE

## 2019-07-01 PROCEDURE — 84550 ASSAY OF BLOOD/URIC ACID: CPT | Performed by: FAMILY MEDICINE

## 2019-07-01 PROCEDURE — 82607 VITAMIN B-12: CPT | Performed by: FAMILY MEDICINE

## 2019-07-01 PROCEDURE — 85025 COMPLETE CBC W/AUTO DIFF WBC: CPT | Performed by: FAMILY MEDICINE

## 2019-07-01 PROCEDURE — G0439 PPPS, SUBSEQ VISIT: HCPCS | Performed by: FAMILY MEDICINE

## 2019-07-01 PROCEDURE — 36415 COLL VENOUS BLD VENIPUNCTURE: CPT | Performed by: FAMILY MEDICINE

## 2019-07-01 PROCEDURE — 84443 ASSAY THYROID STIM HORMONE: CPT | Performed by: FAMILY MEDICINE

## 2019-07-01 PROCEDURE — 80076 HEPATIC FUNCTION PANEL: CPT | Performed by: FAMILY MEDICINE

## 2019-07-01 PROCEDURE — 80048 BASIC METABOLIC PNL TOTAL CA: CPT | Performed by: FAMILY MEDICINE

## 2019-07-01 PROCEDURE — 83036 HEMOGLOBIN GLYCOSYLATED A1C: CPT | Performed by: FAMILY MEDICINE

## 2019-07-01 RX ORDER — LOSARTAN POTASSIUM AND HYDROCHLOROTHIAZIDE 12.5; 1 MG/1; MG/1
1 TABLET ORAL DAILY
Qty: 90 TABLET | Refills: 3 | Status: SHIPPED | OUTPATIENT
Start: 2019-07-01 | End: 2020-06-09

## 2019-07-01 RX ORDER — GEMFIBROZIL 600 MG/1
TABLET, FILM COATED ORAL
Qty: 180 TABLET | Refills: 3 | Status: SHIPPED | OUTPATIENT
Start: 2019-07-01 | End: 2020-09-15

## 2019-07-01 ASSESSMENT — ENCOUNTER SYMPTOMS
WEAKNESS: 0
PARESTHESIAS: 0
SHORTNESS OF BREATH: 0
DIZZINESS: 0
HEMATOCHEZIA: 0
EYE PAIN: 0
DYSURIA: 0
HEADACHES: 0
COUGH: 0
CONSTIPATION: 0
DIARRHEA: 0
PALPITATIONS: 0
MYALGIAS: 0
FREQUENCY: 0
ABDOMINAL PAIN: 0
NAUSEA: 0
SORE THROAT: 0
FEVER: 0
NERVOUS/ANXIOUS: 0
HEMATURIA: 0
HEARTBURN: 0
ARTHRALGIAS: 1
CHILLS: 0
JOINT SWELLING: 0

## 2019-07-01 ASSESSMENT — ACTIVITIES OF DAILY LIVING (ADL): CURRENT_FUNCTION: NO ASSISTANCE NEEDED

## 2019-07-01 ASSESSMENT — MIFFLIN-ST. JEOR: SCORE: 1556.88

## 2019-07-01 NOTE — NURSING NOTE
"Chief Complaint   Patient presents with     Physical       Initial /70 (BP Location: Right arm, Patient Position: Chair, Cuff Size: Adult Large)   Pulse 59   Temp 97.4  F (36.3  C) (Tympanic)   Resp 16   Ht 1.702 m (5' 7\")   Wt 83.8 kg (184 lb 12.8 oz)   SpO2 98%   BMI 28.94 kg/m   Estimated body mass index is 28.94 kg/m  as calculated from the following:    Height as of this encounter: 1.702 m (5' 7\").    Weight as of this encounter: 83.8 kg (184 lb 12.8 oz).    Patient presents to the clinic using No DME    Health Maintenance that is potentially due pending provider review:  NONE    n/a    Is there anyone who you would like to be able to receive your results? No  If yes have patient fill out [facility]    "

## 2019-07-01 NOTE — PROGRESS NOTES
"SUBJECTIVE:   Bruno Baig is a 68 year old male who presents for Preventive Visit.      Are you in the first 12 months of your Medicare coverage?  No    Healthy Habits:     In general, how would you rate your overall health?  Good    Frequency of exercise:  1 day/week    Duration of exercise:  Less than 15 minutes    Do you usually eat at least 4 servings of fruit and vegetables a day, include whole grains    & fiber and avoid regularly eating high fat or \"junk\" foods?  No    Taking medications regularly:  Yes    Medication side effects:  None    Ability to successfully perform activities of daily living:  No assistance needed    Home Safety:  No safety concerns identified    Hearing Impairment:  No hearing concerns    In the past 6 months, have you been bothered by leaking of urine?  No    In general, how would you rate your overall mental or emotional health?  Good      PHQ-2 Total Score: 0    Additional concerns today:  Yes    Do you feel safe in your environment? Yes    Do you have a Health Care Directive? No: Advance care planning was reviewed with patient; patient declined at this time.      Fall risk  Fallen 2 or more times in the past year?: No  Any fall with injury in the past year?: No    Cognitive Screening   1) Repeat 3 items (Leader, Season, Table)    2) Clock draw: NORMAL  3) 3 item recall: Recalls 3 objects  Results: 3 items recalled: COGNITIVE IMPAIRMENT LESS LIKELY    Mini-CogTM Copyright KAREN Jauregui. Licensed by the author for use in [facility]; reprinted with permission (nina@.[facility]). All rights reserved.      \    Reviewed and updated as needed this visit by clinical staff  Tobacco  Allergies  Meds  Problems  Med Hx  Surg Hx  Fam Hx  Soc Hx          Reviewed and updated as needed this visit by Provider  Tobacco  Allergies  Meds  Problems  Med Hx  Surg Hx  Fam Hx        Social History     Tobacco Use     Smoking status: Current Every Day Smoker     Packs/day: 1.00     " Years: 30.00     Pack years: 30.00     Types: Cigarettes     Smokeless tobacco: Never Used     Tobacco comment: 1/2 pack daily    Substance Use Topics     Alcohol use: Yes     Comment: 5-6 daily         Alcohol Use 7/1/2019   Prescreen: >3 drinks/day or >7 drinks/week? Yes   Prescreen: >3 drinks/day or >7 drinks/week? -   AUDIT SCORE  10     AUDIT - Alcohol Use Disorders Identification Test - Reproduced from the World Health Organization Audit 2001 (Second Edition) 7/1/2019   1.  How often do you have a drink containing alcohol? 4 or more times a week   2.  How many drinks containing alcohol do you have on a typical day when you are drinking? 5 or 6   3.  How often do you have five or more drinks on one occasion? Daily or almost daily   4.  How often during the last year have you found that you were not able to stop drinking once you had started? Never   5.  How often during the last year have you failed to do what was normally expected of you because of drinking? Never   6.  How often during the last year have you needed a first drink in the morning to get yourself going after a heavy drinking session? Never   7.  How often during the last year have you had a feeling of guilt or remorse after drinking? Never   8.  How often during the last year have you been unable to remember what happened the night before because of your drinking? Never   9.  Have you or someone else been injured because of your drinking? No   10. Has a relative, friend, doctor or other health care worker been concerned about your drinking or suggested you cut down? No   TOTAL SCORE 10     Lump      Duration: several years    Description (location/character/radiation): lump left testicle seems to maybe come and go     No pain     No urinary sxs at all     Nocturia 1-2x unchanged for years     Intensity:  mild    Accompanying signs and symptoms: none    History (similar episodes/previous evaluation): None    Precipitating or alleviating factors:  None    Therapies tried and outcome: None          Musculoskeletal problem/pain      Duration: a couple months    Description  Location: left shoulder    Intensity:  moderate, 5/10    Accompanying signs and symptoms: none    History  Previous similar problem: no   Previous evaluation:  none    Precipitating or alleviating factors:  Trauma or overuse: no   Aggravating factors include: just moving it    Therapies tried and outcome: nothing    Hypertension Follow-up      Do you check your blood pressure regularly outside of the clinic? No     Are you following a low salt diet? Yes    Are your blood pressures ever more than 140 on the top number (systolic) OR more   than 90 on the bottom number (diastolic), for example 140/90? unsure  Chronic Kidney Disease Follow-up      Current NSAID use?  No    Gout is stable on meds    Pre dm   He is checking very occasionally his BS and he is tracking his diet some        Current providers sharing in care for this patient include:   Patient Care Team:  Charisse Lopez MD as PCP - General (Family Practice)  Charisse Lopez MD as Assigned PCP    The following health maintenance items are reviewed in Epic and correct as of today:  Health Maintenance   Topic Date Due     DTAP/TDAP/TD IMMUNIZATION (1 - Tdap) 11/19/1973     ZOSTER IMMUNIZATION (1 of 2) 11/19/1998     PNEUMOCOCCAL IMMUNIZATION 65+ LOW/MEDIUM RISK (1 of 2 - PCV13) 11/19/2013     FALL RISK ASSESSMENT  06/18/2019     LUNG CANCER SCREENING ANNUAL  06/27/2019     MEDICARE ANNUAL WELLNESS VISIT  06/18/2019     INFLUENZA VACCINE (1) 09/01/2019     ADVANCE CARE PLANNING  07/24/2022     LIPID  06/18/2023     COLONOSCOPY  11/09/2027     HEPATITIS C SCREENING  Completed     PHQ-2  Completed     AORTIC ANEURYSM SCREENING (SYSTEM ASSIGNED)  Completed     IPV IMMUNIZATION  Aged Out     MENINGITIS IMMUNIZATION  Aged Out     Lab work is in process  Pneumonia Vaccine:Adults age 65+ who received Pneumovax (PPSV23) at 65 years  "or older: Should be given PCV13 > 1 year after their most recent PPSV23    Review of Systems   Constitutional: Negative for chills and fever.   HENT: Negative for congestion, ear pain, hearing loss and sore throat.    Eyes: Negative for pain and visual disturbance.   Respiratory: Negative for cough and shortness of breath.    Cardiovascular: Negative for chest pain, palpitations and peripheral edema.   Gastrointestinal: Negative for abdominal pain, constipation, diarrhea, heartburn, hematochezia and nausea.   Genitourinary: Positive for impotence. Negative for discharge, dysuria, frequency, genital sores, hematuria and urgency.   Musculoskeletal: Positive for arthralgias. Negative for joint swelling and myalgias.   Skin: Negative for rash.   Neurological: Negative for dizziness, weakness, headaches and paresthesias.   Psychiatric/Behavioral: Negative for mood changes. The patient is not nervous/anxious.      See above     OBJECTIVE:   /70 (BP Location: Right arm, Patient Position: Chair, Cuff Size: Adult Large)   Pulse 59   Temp 97.4  F (36.3  C) (Tympanic)   Resp 16   Ht 1.702 m (5' 7\")   Wt 83.8 kg (184 lb 12.8 oz)   SpO2 98%   BMI 28.94 kg/m   Estimated body mass index is 28.94 kg/m  as calculated from the following:    Height as of this encounter: 1.702 m (5' 7\").    Weight as of this encounter: 83.8 kg (184 lb 12.8 oz).  Physical Exam  GENERAL: healthy, alert and no distress  EYES: Eyes grossly normal to inspection, PERRL and conjunctivae and sclerae normal  HENT: ear canals and TM's normal, nose and mouth without ulcers or lesions  NECK: no adenopathy, no asymmetry, masses, or scars and thyroid normal to palpation  RESP: lungs clear to auscultation - no rales, rhonchi or wheezes  CV: regular rate and rhythm, normal S1 S2, no S3 or S4, no murmur, click or rub, no peripheral edema and peripheral pulses strong  ABDOMEN: soft, nontender, no hepatosplenomegaly, no masses and bowel sounds normal   " (male): testicles normal without atrophy or masses  MS: no gross musculoskeletal defects noted, no edema  SKIN: no suspicious lesions or rashes  NEURO: Normal strength and tone, mentation intact and speech normal  PSYCH: mentation appears normal, affect normal/bright    Diagnostic Test Results:  Labs reviewed in Epic    ASSESSMENT / PLAN:   1. Encounter for Medicare annual wellness exam      2. Benign essential hypertension  Stable no change in treatment plan.   - losartan-hydrochlorothiazide (HYZAAR) 100-12.5 MG tablet; Take 1 tablet by mouth daily  Dispense: 90 tablet; Refill: 3  - gemfibrozil (LOPID) 600 MG tablet; TAKE 1 TABLET (600 MG) BY MOUTH 2 TIMES DAILY  Dispense: 180 tablet; Refill: 3  - Lipid panel reflex to direct LDL Fasting  - TSH with free T4 reflex  - CBC with platelets differential  - Basic metabolic panel  - Hepatic panel    3. Chronic left shoulder pain  Likely early arthritis vs tendonitis   Would do cortisone if PT not helpful   - XR Shoulder Left 2 Views; Future  - PHYSICAL THERAPY REFERRAL; Future    4. CKD (chronic kidney disease) stage 3, GFR 30-59 ml/min (H)  Stable no change in treatment plan.     5. Anemia due to vitamin B12 deficiency, unspecified B12 deficiency type    - Vitamin B12    6. Pre-diabetes  Adjust meds as indicated by above labs.   - Hemoglobin A1c    7. Acute gout, unspecified cause, unspecified site  Stable no change in treatment plan.   - Uric acid    8. Personal history of tobacco use    - Prof fee: Shared Decisionmaking for Lung Cancer Screening  - CT Chest Lung Cancer Scrn Low Dose wo; Future  - Okay for Smoking Cessation Study (PLUTO) to Contact Patient    End of Life Planning:  Patient currently has an advanced directive: Yes.  Practitioner is supportive of decision.    COUNSELING:  Reviewed preventive health counseling, as reflected in patient instructions    Estimated body mass index is 28.94 kg/m  as calculated from the following:    Height as of this encounter:  "1.702 m (5' 7\").    Weight as of this encounter: 83.8 kg (184 lb 12.8 oz).    Weight management plan: Discussed healthy diet and exercise guidelines     reports that he has been smoking cigarettes.  He has a 30.00 pack-year smoking history. He has never used smokeless tobacco.  Tobacco Cessation Action Plan: Information offered: Patient not interested at this time    Appropriate preventive services were discussed with this patient, including applicable screening as appropriate for cardiovascular disease, diabetes, osteopenia/osteoporosis, and glaucoma.  As appropriate for age/gender, discussed screening for colorectal cancer, prostate cancer, breast cancer, and cervical cancer. Checklist reviewing preventive services available has been given to the patient.    Reviewed patients plan of care and provided an AVS. The Basic Care Plan (routine screening as documented in Health Maintenance) for Bruno meets the Care Plan requirement. This Care Plan has been established and reviewed with the Patient.    Counseling Resources:  ATP IV Guidelines  Pooled Cohorts Equation Calculator  Breast Cancer Risk Calculator  FRAX Risk Assessment  ICSI Preventive Guidelines  Dietary Guidelines for Americans, 2010  USDA's MyPlate  ASA Prophylaxis  Lung CA Screening    Charisse Lopez MD  [facility]    Identified Health Risks:    "

## 2019-07-01 NOTE — PROGRESS NOTES
"  Lung Cancer Screening Shared Decision Making Visit     Bruno Baig is eligible for lung cancer screening on the basis of the information provided in my signed lung cancer screening order.     I have discussed with patient the risks and benefits of screening for lung cancer with low-dose CT.     The risks include:  radiation exposure: one low dose chest CT has as much ionizing radiation as about 15 chest x-rays or 6 months of background radiation living in [place]    false positives: 96% of positive findings/nodules are NOT cancer, but some might still require additional diagnostic evaluation, including biopsy  over-diagnosis: some slow growing cancers that might never have been clinically significant will be detected and treated unnecessarily     The benefit of early detection of lung cancer is contingent upon adherence to annual screening or more frequent follow up if indicated.     Furthermore, reaping the benefits of screening requires Bruno Baig to be willing and physically able to undergo diagnostic procedures, if indicated. Although no specific guide is available for determining severity of comorbidities, it is reasonable to withhold screening in patients who have greater mortality risk from other diseases.     We did discuss that the only way to prevent lung cancer is to not smoke. Smoking cessation assistance was offered.    I did not offer risk estimation using a calculator such as this one:    ShouldIScreen  Answers for HPI/ROS submitted by the patient on 7/1/2019   Annual Exam:  In general, how would you rate your overall physical health?: good  Frequency of exercise:: 1 day/week  Do you usually eat at least 4 servings of fruit and vegetables a day, include whole grains & fiber, and avoid regularly eating high fat or \"junk\" foods? : No  Taking medications regularly:: Yes  Medication side effects:: None  Activities of Daily Living: no assistance needed  Home safety: no safety concerns " identified  Hearing Impairment:: no hearing concerns  In the past 6 months, have you been bothered by leaking of urine?: No  abdominal pain: No  Blood in stool: No  Blood in urine: No  chest pain: No  chills: No  congestion: No  constipation: No  cough: No  diarrhea: No  dizziness: No  ear pain: No  eye pain: No  nervous/anxious: No  fever: No  frequency: No  genital sores: No  headaches: No  hearing loss: No  heartburn: No  arthralgias: Yes  joint swelling: No  peripheral edema: No  mood changes: No  myalgias: No  nausea: No  dysuria: No  palpitations: No  Skin sensation changes: No  sore throat: No  urgency: No  rash: No  shortness of breath: No  visual disturbance: No  weakness: No  impotence: Yes  penile discharge: No  In general, how would you rate your overall mental or emotional health?: good  Additional concerns today:: Yes  Duration of exercise:: Less than 15 minutes

## 2019-07-01 NOTE — PATIENT INSTRUCTIONS
Patient Education   Personalized Prevention Plan  You are due for the preventive services outlined below.  Your care team is available to assist you in scheduling these services.  If you have already completed any of these items, please share that information with your care team to update in your medical record.  Lung Cancer Screening   Frequently Asked Questions  If you are at high-risk for lung cancer, getting screened with low-dose computed tomography (LDCT) every year can help save your life. This handout offers answers to some of the most common questions about lung cancer screening. If you have other questions, please call [phone] ([phone]).     What is it?  Lung cancer screening uses special X-ray technology to create an image of your lung tissue. The exam is quick and easy and takes less than 10 seconds. We don t give you any medicine or use any needles. You can eat before and after the exam. You don t need to change your clothes as long as the clothing on your chest doesn t contain metal. But, you do need to be able to hold your breath for at least 6 seconds during the exam.    What is the goal of lung cancer screening?  The goal of lung cancer screening is to save lives. Many times, lung cancer is not found until a person starts having physical symptoms. Lung cancer screening can help detect lung cancer in the earliest stages when it may be easier to treat.    Who should be screened for lung cancer?  We suggest lung cancer screening for anyone who is at high-risk for lung cancer. You are in the high-risk group if you:      are between the ages of 55 and 79, and    have smoked at least 1 pack of cigarettes a day for 30 or more years, and    still smoke or have quit within the past 15 years.    However, if you have a new cough or shortness of breath, you should talk to your doctor before being screened.    Some national lung health advocacy groups also recommend screening for people ages  50 to 79 who have smoked an average of 1 pack of cigarettes a day for 20 years. They must also have at least 1 other risk factor for lung cancer, not including exposure to secondhand smoke. Other risk factors are having had cancer in the past, emphysema, pulmonary fibrosis, COPD, a family history of lung cancer, or exposure to certain materials such as arsenic, asbestos, beryllium, cadmium, chromium, diesel fumes, nickel, radon or silica. Your care team can help you know if you have one of these risk factors.     Why does it matter if I have symptoms?  Certain symptoms can be a sign that you have a condition in your lungs that should be checked and treated by your doctor. These symptoms include fever, chest pain, a new or changing cough, shortness of breath that you have never felt before, coughing up blood or unexplained weight loss. Having any of these symptoms can greatly affect the results of lung cancer screening.       Should all smokers get an LDCT lung cancer screening exam?  It depends. Lung cancer screening is for a very specific group of men and women who have a history of heavy smoking over a long period of time (see  Who should be screened for lung cancer  above).  I am in the high-risk group, but have been diagnosed with cancer in the past. Is LDCT lung cancer screening right for me?  In some cases, you should not have LDCT lung screening, such as when your doctor is already following your cancer with CT scan studies. Your doctor will help you decide if LDCT lung screening is right for you.  Do I need to have a screening exam every year?  Yes. If you are in the high-risk group described earlier, you should get an LDCT lung cancer screening exam every year until you are 79, or are no longer willing or able to undergo screening and possible procedures to diagnose and treat lung cancer.  How effective is LDCT at preventing death from lung cancer?  Studies have shown that LDCT lung cancer screening can  lower the risk of death from lung cancer by 20 percent in people who are at high-risk.  What are the risks?  There are some risks and limitations of LDCT lung cancer screening. We want to make sure you understand the risks and benefits, so please let us know if you have any questions. Your doctor may want to talk with you more about these risks.    Radiation exposure: As with any exam that uses radiation, there is a very small increased risk of cancer. The amount of radiation in LDCT is small--about the same amount a person would get from a mammogram. Your doctor orders the exam when he or she feels the potential benefits outweigh the risks.    False negatives: No test is perfect, including LDCT. It is possible that you may have a medical condition, including lung cancer, that is not found during your exam. This is called a false negative result.    False positives and more testing: LDCT very often finds something in the lung that could be cancer, but in fact is not. This is called a false positive result. False positive tests often cause anxiety. To make sure these findings are not cancer, you may need to have more tests. These tests will be done only if you give us permission. Sometimes patients need a treatment that can have side effects, such as a biopsy. For more information on false positives, see  What can I expect from the results?     Findings not related to lung cancer: Your LDCT exam also takes pictures of areas of your body next to your lungs. In a very small number of cases, the CT scan will show an abnormal finding in one of these areas, such as your kidneys, adrenal glands, liver or thyroid. This finding may not be serious, but you may need more tests. Your doctor can help you decide what other tests you may need, if any.  What can I expect from the results?  About 1 out of 4 LDCT exams will find something that may need more tests. Most of the time, these findings are lung nodules. Lung nodules are  very small collections of tissue in the lung. These nodules are very common, and the vast majority--more than 97 percent--are not cancer (benign). Most are normal lymph nodes or small areas of scarring from past infections.  But, if a small lung nodule is found to be cancer, the cancer can be cured more than 90 percent of the time. To know if the nodule is cancer, we may need to get more images before your next yearly screening exam. If the nodule has suspicious features (for example, it is large, has an odd shape or grows over time), we will refer you to a specialist for further testing.  Will my doctor also get the results?  Yes. Your doctor will get a copy of your results.  Is it okay to keep smoking now that there s a cancer screening exam?  No. Tobacco is one of the strongest cancer-causing agents. It causes not only lung cancer, but other cancers and cardiovascular (heart) diseases as well. The damage caused by smoking builds over time. This means that the longer you smoke, the higher your risk of disease. While it is never too late to quit, the sooner you quit, the better.  Where can I find help to quit smoking?  The best way to prevent lung cancer is to stop smoking. If you have already quit smoking, congratulations and keep it up! For help on quitting smoking, please call [facility] at 4-886-161-ZFNS ([phone]) or the American [employer] at 1-667.431.7893 to find local resources near you.  One-on-one health coaching:  If you d prefer to work individually with a health care provider on tobacco cessation, we offer:      Medication Therapy Management:  Our specially trained pharmacists work closely with you and your doctor to help you quit smoking.  Call [phone] or [phone] (toll free).     Can Do: Health coaching offered by [facility].  [email or web address]      Labs today     Medications refilled     No changes in medications    Set up PT for shoulder   If not helpful after a month  would consider steroid injection     Set up lung scan

## 2019-07-02 DIAGNOSIS — I10 BENIGN ESSENTIAL HYPERTENSION: Primary | ICD-10-CM

## 2019-07-03 ENCOUNTER — HOSPITAL ENCOUNTER (OUTPATIENT)
Dept: CT IMAGING | Facility: CLINIC | Age: 69
Discharge: HOME | End: 2019-07-03
Attending: FAMILY MEDICINE | Admitting: FAMILY MEDICINE
Payer: COMMERCIAL

## 2019-07-03 DIAGNOSIS — Z87.891 PERSONAL HISTORY OF TOBACCO USE: ICD-10-CM

## 2019-07-03 PROCEDURE — G0297 LDCT FOR LUNG CA SCREEN: HCPCS

## 2019-07-09 ENCOUNTER — HOSPITAL ENCOUNTER (OUTPATIENT)
Dept: PHYSICAL THERAPY | Facility: CLINIC | Age: 69
Setting detail: THERAPIES SERIES
End: 2019-07-09
Attending: FAMILY MEDICINE
Payer: COMMERCIAL

## 2019-07-09 DIAGNOSIS — G89.29 CHRONIC LEFT SHOULDER PAIN: ICD-10-CM

## 2019-07-09 DIAGNOSIS — I10 BENIGN ESSENTIAL HYPERTENSION: ICD-10-CM

## 2019-07-09 DIAGNOSIS — M25.512 CHRONIC LEFT SHOULDER PAIN: ICD-10-CM

## 2019-07-09 LAB
ANION GAP SERPL CALCULATED.3IONS-SCNC: 7 MMOL/L (ref 3–14)
BUN SERPL-MCNC: 28 MG/DL (ref 7–30)
CALCIUM SERPL-MCNC: 10.5 MG/DL (ref 8.5–10.1)
CHLORIDE SERPL-SCNC: 107 MMOL/L (ref 94–109)
CO2 SERPL-SCNC: 22 MMOL/L (ref 20–32)
CREAT SERPL-MCNC: 1.13 MG/DL (ref 0.66–1.25)
ERYTHROCYTE [DISTWIDTH] IN BLOOD BY AUTOMATED COUNT: 11.8 % (ref 10–15)
GFR SERPL CREATININE-BSD FRML MDRD: 65 ML/MIN/{1.73_M2}
GLUCOSE SERPL-MCNC: 104 MG/DL (ref 70–99)
HCT VFR BLD AUTO: 37.5 % (ref 40–53)
HGB BLD-MCNC: 12.3 G/DL (ref 13.3–17.7)
MCH RBC QN AUTO: 31.8 PG (ref 26.5–33)
MCHC RBC AUTO-ENTMCNC: 32.8 G/DL (ref 31.5–36.5)
MCV RBC AUTO: 97 FL (ref 78–100)
PLATELET # BLD AUTO: 338 10E9/L (ref 150–450)
POTASSIUM SERPL-SCNC: 4.4 MMOL/L (ref 3.4–5.3)
RBC # BLD AUTO: 3.87 10E12/L (ref 4.4–5.9)
SODIUM SERPL-SCNC: 136 MMOL/L (ref 133–144)
WBC # BLD AUTO: 7.3 10E9/L (ref 4–11)

## 2019-07-09 PROCEDURE — 97110 THERAPEUTIC EXERCISES: CPT | Mod: GP

## 2019-07-09 PROCEDURE — 80048 BASIC METABOLIC PNL TOTAL CA: CPT | Performed by: FAMILY MEDICINE

## 2019-07-09 PROCEDURE — 36415 COLL VENOUS BLD VENIPUNCTURE: CPT | Performed by: FAMILY MEDICINE

## 2019-07-09 PROCEDURE — 85027 COMPLETE CBC AUTOMATED: CPT | Performed by: FAMILY MEDICINE

## 2019-07-09 PROCEDURE — 97161 PT EVAL LOW COMPLEX 20 MIN: CPT | Mod: GP

## 2019-07-09 NOTE — PROGRESS NOTES
[facility]          OUTPATIENT PHYSICAL THERAPY ORTHOPEDIC EVALUATION  PLAN OF TREATMENT FOR OUTPATIENT REHABILITATION  (COMPLETE FOR INITIAL CLAIMS ONLY)  Patient's Last Name, First Name, M.I.  YOB: 1950  JovanniBruno  NICOLE    Provider s Name:  [facility]   Medical Record No.  [id number]   Start of Care Date:  07/09/19   Onset Date:  05/09/19   Type:     _X__PT   ___OT   ___SLP Medical Diagnosis:  Chronic left shoulder pain      PT Diagnosis:  L shld pain. Signs and symptoms suggest impingement syndrome.   Visits from SOC:  1      _________________________________________________________________________________  Plan of Treatment/Functional Goals:  strengthening, stretching           Goals  Goal Identifier: 1  Goal Description: Pt will be able to reach out and back with < 2/10 pain.  Target Date: 07/30/19    Goal Identifier: 2  Goal Description: Pt will be able to lift 5# onto a high shelf with < 2/10 pain.  Target Date: 08/20/19    Goal Identifier: 3  Goal Description: Pt will be independent with HEP for optimal functional recovery.  Target Date: 08/20/19                                                           Therapy Frequency:  1 time/week  Predicted Duration of Therapy Intervention:  6 weeks    CHELSI CABA, PT                 I CERTIFY THE NEED FOR THESE SERVICES FURNISHED UNDER        THIS PLAN OF TREATMENT AND WHILE UNDER MY CARE     (Physician co-signature of this document indicates review and certification of the therapy plan).                       Certification Date From:  07/09/19   Certification Date To:  09/03/19    Referring Provider:  Dr Lopez    Initial Assessment        See Epic Evaluation Start of Care Date: 07/09/19

## 2019-07-09 NOTE — PROGRESS NOTES
07/09/19 1300   General Information   Type of Visit Initial OP Ortho PT Evaluation   Start of Care Date 07/09/19   Referring Physician Dr Lopez   Patient/Family Goals Statement get rid of the pain   Orders Evaluate and Treat   Date of Order 07/01/19   Certification Required? Yes   Medical Diagnosis Chronic left shoulder pain    Surgical/Medical history reviewed Yes  (HTN, smoking)   Body Part(s)   Body Part(s) Shoulder   Presentation and Etiology   Pertinent history of current problem (include personal factors and/or comorbidities that impact the POC) Pt reports L shld pain slowly came on over a couple days 2 months ago. Pt denies accident or injury. He has been resting it and it is starting to get better over the last week.   Impairments A. Pain;D. Decreased ROM;F. Decreased strength and endurance   Functional Limitations perform activities of daily living;perform desired leisure / sports activities   Symptom Location across the top of the L shld, sometimes down the back of the arm to the wrist   How/Where did it occur From insidious onset   Onset date of current episode/exacerbation 05/09/19   Chronicity New   Pain rating (0-10 point scale) Best (/10);Worst (/10)  (currently 0/10)   Best (/10) 0   Worst (/10) 7  (lifting out to the side and back)   Pain quality A. Sharp;E. Shooting   Frequency of pain/symptoms C. With activity   Pain/symptoms are: The same all the time   Pain/symptoms exacerbated by C. Lifting;G. Certain positions   Pain/symptoms eased by C. Rest;E. Changing positions;F. Certain positions   Progression of symptoms since onset: Improved   Prior Level of Function   Functional Level Prior Comment independent   Current Level of Function   Current Community Support Family/friend caregiver   Patient role/employment history F. Retired   [facility]   Home/community accessibility drive   Fall Risk Screen   Fall screen completed by PT   Have you fallen 2 or more times in the past  year? No   Have you fallen and had an injury in the past year? No   Is patient a fall risk? No   Abuse Screen (yes response referral indicated)   Feels Unsafe at Home or Work/School no   Feels Threatened by Someone no   Does Anyone Try to Keep You From Having Contact with Others or Doing Things Outside Your Home? no   Physical Signs of Abuse Present no   Functional Scales   Functional Scales Other   Other Scales  SPADI 16.15   Shoulder Objective Findings   Side (if bilateral, select both right and left) Left   Posture slight fwd head and rounded shlds   Cervical Screen (ROM, quadrant) CROM WFL and painfree   Shoulder ROM Comment WFL with pain at 100* abduction L shld   Pec Minor (supine) Flexibility tight   Neer's Test +   Mejia-West Test +   Sturgis's Test -   Crossover Test -   Shoulder Special Tests Comments - Speeds, - Empty can   Palpation no palpable tenderness L biceps, supraspinatus, infraspinatus, teres, deltoid   Left Shoulder Flexion Strength 5/5   Left Shoulder Abduction Strength 5/5   Left Shoulder ER Strength 4/5   Left Shoulder IR Strength 5/5   Left Shoulder Extension Strength 4+/5   Left Mid Trapezius Strength 4/5   Left Lower Trapezius Strength 4-/5   Planned Therapy Interventions   Planned Therapy Interventions strengthening;stretching   Clinical Impression   Criteria for Skilled Therapeutic Interventions Met yes, treatment indicated   PT Diagnosis L shld pain. Signs and symptoms suggest impingement syndrome.   Influenced by the following impairments pain and weakness   Functional limitations due to impairments lifting, reaching out and back   Clinical Presentation Stable/Uncomplicated   Clinical Presentation Rationale clinical judgement   Clinical Decision Making (Complexity) Low complexity   Therapy Frequency 1 time/week   Predicted Duration of Therapy Intervention (days/wks) 6 weeks   Risk & Benefits of therapy have been explained Yes   Patient, Family & other staff in agreement with plan  of care Yes   Education Assessment   Preferred Learning Style Listening;Demonstration;Pictures/video   Barriers to Learning No barriers   ORTHO GOALS   PT Ortho Eval Goals 1;2;3   Ortho Goal 1   Goal Identifier 1   Goal Description Pt will be able to reach out and back with < 2/10 pain.   Target Date 07/30/19   Ortho Goal 2   Goal Identifier 2   Goal Description Pt will be able to lift 5# onto a high shelf with < 2/10 pain.   Target Date 08/20/19   Ortho Goal 3   Goal Identifier 3   Goal Description Pt will be independent with HEP for optimal functional recovery.   Target Date 08/20/19   Total Evaluation Time   PT Eval, Low Complexity Minutes (14540) 10   Therapy Certification   Certification date from 07/09/19   Certification date to 09/03/19   Medical Diagnosis Chronic left shoulder pain      Liss Chamorro PT

## 2019-07-15 DIAGNOSIS — I10 BENIGN ESSENTIAL HYPERTENSION: Primary | ICD-10-CM

## 2019-08-12 DIAGNOSIS — I10 BENIGN ESSENTIAL HYPERTENSION: ICD-10-CM

## 2019-08-12 LAB
ANION GAP SERPL CALCULATED.3IONS-SCNC: 7 MMOL/L (ref 3–14)
BUN SERPL-MCNC: 29 MG/DL (ref 7–30)
CALCIUM SERPL-MCNC: 10.3 MG/DL (ref 8.5–10.1)
CHLORIDE SERPL-SCNC: 104 MMOL/L (ref 94–109)
CO2 SERPL-SCNC: 24 MMOL/L (ref 20–32)
CREAT SERPL-MCNC: 1.22 MG/DL (ref 0.66–1.25)
GFR SERPL CREATININE-BSD FRML MDRD: 59 ML/MIN/{1.73_M2}
GLUCOSE SERPL-MCNC: 119 MG/DL (ref 70–99)
POTASSIUM SERPL-SCNC: 3.9 MMOL/L (ref 3.4–5.3)
PTH-INTACT SERPL-MCNC: 35 PG/ML (ref 18–80)
SODIUM SERPL-SCNC: 135 MMOL/L (ref 133–144)

## 2019-08-12 PROCEDURE — 36415 COLL VENOUS BLD VENIPUNCTURE: CPT | Performed by: FAMILY MEDICINE

## 2019-08-12 PROCEDURE — 80048 BASIC METABOLIC PNL TOTAL CA: CPT | Performed by: FAMILY MEDICINE

## 2019-08-12 PROCEDURE — 83970 ASSAY OF PARATHORMONE: CPT | Performed by: FAMILY MEDICINE

## 2019-08-14 DIAGNOSIS — E83.52 SERUM CALCIUM ELEVATED: ICD-10-CM

## 2019-08-14 DIAGNOSIS — I10 BENIGN ESSENTIAL HYPERTENSION: Primary | ICD-10-CM

## 2019-08-22 NOTE — ADDENDUM NOTE
Encounter addended by: Liss Chamorro, PT on: 8/22/2019 10:26 AM   Actions taken: Sign clinical note, Episode resolved

## 2019-09-03 ENCOUNTER — OFFICE VISIT (OUTPATIENT)
Dept: FAMILY MEDICINE | Facility: CLINIC | Age: 69
End: 2019-09-03
Payer: COMMERCIAL

## 2019-09-03 VITALS
SYSTOLIC BLOOD PRESSURE: 132 MMHG | BODY MASS INDEX: 28.56 KG/M2 | OXYGEN SATURATION: 96 % | HEIGHT: 67 IN | DIASTOLIC BLOOD PRESSURE: 58 MMHG | WEIGHT: 182 LBS | RESPIRATION RATE: 18 BRPM | HEART RATE: 64 BPM | TEMPERATURE: 97.3 F

## 2019-09-03 DIAGNOSIS — R07.0 THROAT PAIN: ICD-10-CM

## 2019-09-03 DIAGNOSIS — J02.0 STREP THROAT: Primary | ICD-10-CM

## 2019-09-03 LAB
DEPRECATED S PYO AG THROAT QL EIA: ABNORMAL
SPECIMEN SOURCE: ABNORMAL

## 2019-09-03 PROCEDURE — 99213 OFFICE O/P EST LOW 20 MIN: CPT | Performed by: PHYSICIAN ASSISTANT

## 2019-09-03 PROCEDURE — 87081 CULTURE SCREEN ONLY: CPT | Performed by: PHYSICIAN ASSISTANT

## 2019-09-03 PROCEDURE — 87880 STREP A ASSAY W/OPTIC: CPT | Performed by: PHYSICIAN ASSISTANT

## 2019-09-03 RX ORDER — AMOXICILLIN 500 MG/1
500 CAPSULE ORAL 2 TIMES DAILY
Qty: 20 CAPSULE | Refills: 0 | Status: SHIPPED | OUTPATIENT
Start: 2019-09-03 | End: 2019-12-06

## 2019-09-03 ASSESSMENT — ENCOUNTER SYMPTOMS
SORE THROAT: 1
CHILLS: 0
ARTHRALGIAS: 0
CONSTIPATION: 0
SINUS PAIN: 0
COUGH: 0
SINUS PRESSURE: 0
WHEEZING: 0
EYE ITCHING: 0
MYALGIAS: 0
SHORTNESS OF BREATH: 0
EYE PAIN: 0
FEVER: 0
ABDOMINAL PAIN: 0
VOMITING: 0
PALPITATIONS: 0
FATIGUE: 0
DIARRHEA: 0
EYE DISCHARGE: 0
NAUSEA: 0
RHINORRHEA: 0
UNEXPECTED WEIGHT CHANGE: 0
EYE REDNESS: 0

## 2019-09-03 ASSESSMENT — MIFFLIN-ST. JEOR: SCORE: 1544.18

## 2019-09-03 ASSESSMENT — PAIN SCALES - GENERAL: PAINLEVEL: SEVERE PAIN (6)

## 2019-09-03 NOTE — PROGRESS NOTES
SUBJECTIVE:   Bruno Baig is a 68 year old male presenting with a chief complaint of   Chief Complaint   Patient presents with     Ent Problem     Patient has had the following symptoms X 2 weeks thrat pain,left ear pain and glands. Patient declines the following: fever/chills, headache, nausea vomiting, eye irritation, cough, wheezing. Pateint hasn't tried any OTC mediacation for discomfort. Patient rates pain at a constant 3/10 and swollowing 6/10.        He is an established patient of [facility].    URI Adult    Onset of symptoms was 2 week(s) ago.  Course of illness is same.    Severity moderate  Current and Associated symptoms: sore throat and left ear pain  Treatment measures tried include None tried.  Predisposing factors include None.        Review of Systems   Constitutional: Negative for chills, fatigue, fever and unexpected weight change.   HENT: Positive for ear pain and sore throat. Negative for congestion, rhinorrhea, sinus pressure and sinus pain.    Eyes: Negative for pain, discharge, redness and itching.   Respiratory: Negative for cough, shortness of breath and wheezing.    Cardiovascular: Negative for chest pain, palpitations and leg swelling.   Gastrointestinal: Negative for abdominal pain, constipation, diarrhea, nausea and vomiting.   Musculoskeletal: Negative for arthralgias and myalgias.   Skin: Negative for rash.       History reviewed. No pertinent past medical history.  Family History   Problem Relation Age of Onset     Other Cancer Mother         lung     Heart Failure Father      Lung Cancer Sister      Current Outpatient Medications   Medication Sig Dispense Refill     amoxicillin (AMOXIL) 500 MG capsule Take 1 capsule (500 mg) by mouth 2 times daily for 10 days 20 capsule 0     aspirin 81 MG tablet Take by mouth daily       Cholecalciferol (VITAMIN D3) 2000 units CAPS Take 1 capsule by mouth every morning        Cyanocobalamin (B-12) 1000 MCG CAPS Take 1 capsule by mouth       fish  "oil-omega-3 fatty acids 1000 MG capsule Take 1 g by mouth every morning        Garlic 1000 MG CAPS Take 1 capsule by mouth every morning        gemfibrozil (LOPID) 600 MG tablet TAKE 1 TABLET (600 MG) BY MOUTH 2 TIMES DAILY 180 tablet 3     losartan-hydrochlorothiazide (HYZAAR) 100-12.5 MG tablet Take 1 tablet by mouth daily 90 tablet 3     blood glucose monitoring (NO BRAND SPECIFIED) meter device kit 1 kit by In Vitro route 2 times daily Use to test blood sugar 2 times daily or as directed. (Patient not taking: Reported on 9/3/2019) 1 kit 0     blood glucose monitoring (NO BRAND SPECIFIED) test strip Use to test blood sugars 2 times daily or as directed (Patient not taking: Reported on 9/3/2019) 1 Box 1     Social History     Tobacco Use     Smoking status: Current Every Day Smoker     Packs/day: 1.00     Years: 30.00     Pack years: 30.00     Types: Cigarettes     Smokeless tobacco: Never Used     Tobacco comment: 1/2 pack daily    Substance Use Topics     Alcohol use: Yes     Comment: 5-6 daily       OBJECTIVE  /58   Pulse 64   Temp 97.3  F (36.3  C) (Tympanic)   Resp 18   Ht 1.702 m (5' 7\")   Wt 82.6 kg (182 lb)   SpO2 96%   BMI 28.51 kg/m      Physical Exam   Constitutional: He appears well-developed and well-nourished. No distress.   HENT:   Head: Normocephalic and atraumatic.   Right Ear: Tympanic membrane and ear canal normal.   Left Ear: Tympanic membrane and ear canal normal.   Throat is injected, no lesions or exudates    Eyes: Pupils are equal, round, and reactive to light. Conjunctivae are normal.   Cardiovascular: Normal rate and regular rhythm.   Pulmonary/Chest: Effort normal and breath sounds normal.   Skin: Skin is warm and dry. No rash noted.   Psychiatric: He has a normal mood and affect. His behavior is normal.       Labs:  Results for orders placed or performed in visit on 09/03/19 (from the past 24 hour(s))   Rapid strep screen   Result Value Ref Range    Specimen Description " Throat     Rapid Strep A Screen (A)      POSITIVE: Group A Streptococcal antigen detected by immunoassay.       X-Ray was not done.    ASSESSMENT:      ICD-10-CM    1. Strep throat J02.0 amoxicillin (AMOXIL) 500 MG capsule   2. Throat pain R07.0 Rapid strep screen     Beta strep group A culture        Medical Decision Making:    Differential Diagnosis:  URI Adult/Peds:  Acute right otitis media, Acute left otitis media, Strep pharyngitis, Tonsilitis, Viral pharyngitis, Viral syndrome and Viral upper respiratory illness    Serious Comorbid Conditions:  Adult:  None    PLAN:    URI Adult:  Tylenol, Ibuprofen, Fluids, Rest and Rx strep  Amoxicillin 1000 mg qd or 500 mg bid x 10 days    Followup:    If not improving or if condition worsens, follow up with your Primary Care Provider    There are no Patient Instructions on file for this visit.

## 2019-09-04 LAB
BACTERIA SPEC CULT: NORMAL
SPECIMEN SOURCE: NORMAL

## 2019-09-27 DIAGNOSIS — E83.52 SERUM CALCIUM ELEVATED: ICD-10-CM

## 2019-09-27 DIAGNOSIS — I10 BENIGN ESSENTIAL HYPERTENSION: ICD-10-CM

## 2019-09-27 LAB
ANION GAP SERPL CALCULATED.3IONS-SCNC: 7 MMOL/L (ref 3–14)
BUN SERPL-MCNC: 26 MG/DL (ref 7–30)
CALCIUM SERPL-MCNC: 10.1 MG/DL (ref 8.5–10.1)
CHLORIDE SERPL-SCNC: 105 MMOL/L (ref 94–109)
CO2 SERPL-SCNC: 25 MMOL/L (ref 20–32)
CREAT SERPL-MCNC: 1.33 MG/DL (ref 0.66–1.25)
GFR SERPL CREATININE-BSD FRML MDRD: 53 ML/MIN/{1.73_M2}
GLUCOSE SERPL-MCNC: 129 MG/DL (ref 70–99)
POTASSIUM SERPL-SCNC: 3.8 MMOL/L (ref 3.4–5.3)
SODIUM SERPL-SCNC: 137 MMOL/L (ref 133–144)

## 2019-09-27 PROCEDURE — 36415 COLL VENOUS BLD VENIPUNCTURE: CPT | Performed by: FAMILY MEDICINE

## 2019-09-27 PROCEDURE — 80048 BASIC METABOLIC PNL TOTAL CA: CPT | Performed by: FAMILY MEDICINE

## 2019-10-29 ENCOUNTER — TELEPHONE (OUTPATIENT)
Dept: FAMILY MEDICINE | Facility: CLINIC | Age: 69
End: 2019-10-29

## 2019-10-29 DIAGNOSIS — I10 BENIGN ESSENTIAL HYPERTENSION: Primary | ICD-10-CM

## 2019-10-29 NOTE — TELEPHONE ENCOUNTER
Bruno has lab apt for 12/4/19.  He is asking for the lab orders to be put in.     Viewed by Bruno Baig on 10/8/2019 10:13 AM   Written by Charisse Lopez MD on 10/1/2019  1:08 PM   Bruno   Your labs show blood sugar to be up a bit but stable and kidney function up a bit too but stable.   We will check this again in 2-3 months. Lab only appt is all you need   Let me know if questions

## 2019-12-04 DIAGNOSIS — I10 BENIGN ESSENTIAL HYPERTENSION: ICD-10-CM

## 2019-12-04 LAB
ANION GAP SERPL CALCULATED.3IONS-SCNC: 6 MMOL/L (ref 3–14)
BUN SERPL-MCNC: 30 MG/DL (ref 7–30)
CALCIUM SERPL-MCNC: 9.9 MG/DL (ref 8.5–10.1)
CHLORIDE SERPL-SCNC: 107 MMOL/L (ref 94–109)
CO2 SERPL-SCNC: 22 MMOL/L (ref 20–32)
CREAT SERPL-MCNC: 1.21 MG/DL (ref 0.66–1.25)
GFR SERPL CREATININE-BSD FRML MDRD: 60 ML/MIN/{1.73_M2}
GLUCOSE SERPL-MCNC: 119 MG/DL (ref 70–99)
POTASSIUM SERPL-SCNC: 4.2 MMOL/L (ref 3.4–5.3)
SODIUM SERPL-SCNC: 135 MMOL/L (ref 133–144)

## 2019-12-04 PROCEDURE — 80048 BASIC METABOLIC PNL TOTAL CA: CPT | Performed by: FAMILY MEDICINE

## 2019-12-04 PROCEDURE — 36415 COLL VENOUS BLD VENIPUNCTURE: CPT | Performed by: FAMILY MEDICINE

## 2019-12-06 ENCOUNTER — OFFICE VISIT (OUTPATIENT)
Dept: FAMILY MEDICINE | Facility: CLINIC | Age: 69
End: 2019-12-06
Payer: COMMERCIAL

## 2019-12-06 VITALS
DIASTOLIC BLOOD PRESSURE: 58 MMHG | BODY MASS INDEX: 29.03 KG/M2 | HEIGHT: 67 IN | OXYGEN SATURATION: 98 % | TEMPERATURE: 97.2 F | WEIGHT: 185 LBS | SYSTOLIC BLOOD PRESSURE: 116 MMHG | HEART RATE: 66 BPM | RESPIRATION RATE: 16 BRPM

## 2019-12-06 DIAGNOSIS — Z23 NEED FOR PROPHYLACTIC VACCINATION AND INOCULATION AGAINST INFLUENZA: ICD-10-CM

## 2019-12-06 DIAGNOSIS — R10.32 LLQ ABDOMINAL PAIN: Primary | ICD-10-CM

## 2019-12-06 LAB
BASOPHILS # BLD AUTO: 0 10E9/L (ref 0–0.2)
BASOPHILS NFR BLD AUTO: 0.2 %
DIFFERENTIAL METHOD BLD: ABNORMAL
EOSINOPHIL # BLD AUTO: 0.3 10E9/L (ref 0–0.7)
EOSINOPHIL NFR BLD AUTO: 2.1 %
ERYTHROCYTE [DISTWIDTH] IN BLOOD BY AUTOMATED COUNT: 12.2 % (ref 10–15)
HCT VFR BLD AUTO: 36.5 % (ref 40–53)
HGB BLD-MCNC: 12.1 G/DL (ref 13.3–17.7)
LYMPHOCYTES # BLD AUTO: 2.4 10E9/L (ref 0.8–5.3)
LYMPHOCYTES NFR BLD AUTO: 19.4 %
MCH RBC QN AUTO: 31.8 PG (ref 26.5–33)
MCHC RBC AUTO-ENTMCNC: 33.2 G/DL (ref 31.5–36.5)
MCV RBC AUTO: 96 FL (ref 78–100)
MONOCYTES # BLD AUTO: 1.7 10E9/L (ref 0–1.3)
MONOCYTES NFR BLD AUTO: 13.9 %
NEUTROPHILS # BLD AUTO: 7.9 10E9/L (ref 1.6–8.3)
NEUTROPHILS NFR BLD AUTO: 64.4 %
PLATELET # BLD AUTO: 322 10E9/L (ref 150–450)
RBC # BLD AUTO: 3.8 10E12/L (ref 4.4–5.9)
WBC # BLD AUTO: 12.3 10E9/L (ref 4–11)

## 2019-12-06 PROCEDURE — 85025 COMPLETE CBC W/AUTO DIFF WBC: CPT | Performed by: PHYSICIAN ASSISTANT

## 2019-12-06 PROCEDURE — 36415 COLL VENOUS BLD VENIPUNCTURE: CPT | Performed by: PHYSICIAN ASSISTANT

## 2019-12-06 PROCEDURE — 90662 IIV NO PRSV INCREASED AG IM: CPT | Performed by: PHYSICIAN ASSISTANT

## 2019-12-06 PROCEDURE — 99214 OFFICE O/P EST MOD 30 MIN: CPT | Mod: 25 | Performed by: PHYSICIAN ASSISTANT

## 2019-12-06 PROCEDURE — G0008 ADMIN INFLUENZA VIRUS VAC: HCPCS | Performed by: PHYSICIAN ASSISTANT

## 2019-12-06 RX ORDER — SODIUM PHOSPHATE,MONO-DIBASIC 19G-7G/118
2 ENEMA (ML) RECTAL DAILY
COMMUNITY
End: 2022-04-08

## 2019-12-06 ASSESSMENT — ENCOUNTER SYMPTOMS
PALPITATIONS: 0
WHEEZING: 0
COUGH: 0
DIARRHEA: 0
ABDOMINAL PAIN: 1
VOMITING: 0
EYE DISCHARGE: 0
SHORTNESS OF BREATH: 0
NAUSEA: 0
BLURRED VISION: 0
SORE THROAT: 0
HEADACHES: 0
CHILLS: 0
EYE REDNESS: 0
FEVER: 0
MYALGIAS: 0

## 2019-12-06 ASSESSMENT — MIFFLIN-ST. JEOR: SCORE: 1552.78

## 2019-12-06 NOTE — PROGRESS NOTES
Subjective     Bruno Baig is a 69 year old male who presents to clinic today for the following health issues:    HPI   Abdominal Pain      Duration: last night     Description (location/character/radiation): across belly button- mostly on the left side        Associated flank pain: None    Intensity:  0/10 right now and at its worst is 5/10    Accompanying signs and symptoms:        Fever/Chills: no        Gas/Bloating: no        Nausea/vomitting: no        Diarrhea: no        Dysuria or Hematuria: no     History (previous similar pain/trauma/previous testing): June had a work up in [facility]. Dx with diverticulitis.     Precipitating or alleviating factors:       Pain worse with eating/BM/urination: None        Pain relieved by BM: YES- along with flatulence and urination     Therapies tried and outcome: None    LMP:  not applicable        Patient Active Problem List   Diagnosis     Acute gout, unspecified cause, unspecified site     Benign essential hypertension     Pre-diabetes     Abdominal aortic aneurysm (AAA) without rupture (H)     Advanced directives, counseling/discussion     Hyperlipidemia LDL goal <100     Tobacco abuse     Refuses tetanus, diphtheria, and acellular pertussis (Tdap) vaccination     Pneumococcal vaccination declined by patient     Anemia due to vitamin B12 deficiency, unspecified B12 deficiency type     CKD (chronic kidney disease) stage 3, GFR 30-59 ml/min (H)     Past Surgical History:   Procedure Laterality Date     COLONOSCOPY N/A 11/9/2017    Procedure: COLONOSCOPY;  colonoscopy;  Surgeon: Justice Horner MD;  [facility]: [facility]       Social History     Tobacco Use     Smoking status: Current Every Day Smoker     Packs/day: 1.00     Years: 30.00     Pack years: 30.00     Types: Cigarettes     Smokeless tobacco: Never Used     Tobacco comment: 1/2 pack daily    Substance Use Topics     Alcohol use: Yes     Comment: 5-6 daily     Family History   Problem Relation Age of Onset      Other Cancer Mother         lung     Heart Failure Father      Lung Cancer Sister          Current Outpatient Medications   Medication Sig Dispense Refill     amoxicillin-clavulanate (AUGMENTIN) 875-125 MG tablet Take 1 tablet by mouth 2 times daily for 7 days 14 tablet 0     aspirin 81 MG tablet Take by mouth daily       Cholecalciferol (VITAMIN D3) 2000 units CAPS Take 1 capsule by mouth every morning        Cyanocobalamin (B-12) 1000 MCG CAPS Take 1 capsule by mouth       fish oil-omega-3 fatty acids 1000 MG capsule Take 1 g by mouth every morning        Garlic 1000 MG CAPS Take 1 capsule by mouth every morning        gemfibrozil (LOPID) 600 MG tablet TAKE 1 TABLET (600 MG) BY MOUTH 2 TIMES DAILY 180 tablet 3     glucosamine-chondroitin 500-400 MG CAPS per capsule Take 2 capsules by mouth daily       losartan-hydrochlorothiazide (HYZAAR) 100-12.5 MG tablet Take 1 tablet by mouth daily 90 tablet 3     blood glucose monitoring (NO BRAND SPECIFIED) meter device kit 1 kit by In Vitro route 2 times daily Use to test blood sugar 2 times daily or as directed. (Patient not taking: Reported on 9/3/2019) 1 kit 0     blood glucose monitoring (NO BRAND SPECIFIED) test strip Use to test blood sugars 2 times daily or as directed (Patient not taking: Reported on 9/3/2019) 1 Box 1     No Known Allergies      Reviewed and updated as needed this visit by Provider  Tobacco  Allergies  Meds  Problems  Med Hx  Surg Hx  Fam Hx       Review of Systems   Constitutional: Negative for chills, fever and malaise/fatigue.   HENT: Negative for congestion, ear pain and sore throat.    Eyes: Negative for blurred vision, discharge and redness.   Respiratory: Negative for cough, shortness of breath and wheezing.    Cardiovascular: Negative for chest pain and palpitations.   Gastrointestinal: Positive for abdominal pain. Negative for diarrhea, nausea and vomiting.   Musculoskeletal: Negative for joint pain and myalgias.   Skin: Negative  "for rash.   Neurological: Negative for headaches.           Objective    /58   Pulse 66   Temp 97.2  F (36.2  C) (Tympanic)   Resp 16   Ht 1.702 m (5' 7\")   Wt 83.9 kg (185 lb)   SpO2 98%   BMI 28.98 kg/m    Body mass index is 28.98 kg/m .    Physical Exam  Constitutional:       General: He is not in acute distress.     Appearance: He is well-developed.   HENT:      Head: Normocephalic and atraumatic.      Right Ear: Tympanic membrane and ear canal normal.      Left Ear: Tympanic membrane and ear canal normal.   Eyes:      Conjunctiva/sclera: Conjunctivae normal.      Pupils: Pupils are equal, round, and reactive to light.   Cardiovascular:      Rate and Rhythm: Normal rate and regular rhythm.   Pulmonary:      Effort: Pulmonary effort is normal.      Breath sounds: Normal breath sounds.   Abdominal:      General: Bowel sounds are normal.      Palpations: Abdomen is soft.      Tenderness: There is abdominal tenderness in the left lower quadrant. There is guarding.   Skin:     General: Skin is warm and dry.      Findings: No rash.   Psychiatric:         Behavior: Behavior normal.             Diagnostic Test Results:  CBC - WBC 12.3         Assessment & Plan     1. LLQ abdominal pain  Slightly elevated WBC and guarding on exam. Will treat for acute diverticulitis with Augmentin two times daily x 7 days. Discussed in detail symptoms that would warrant emergent evaluation in the ED. Patient agrees with plan and will follow up as needed.      - CBC with platelets differential  - amoxicillin-clavulanate (AUGMENTIN) 875-125 MG tablet; Take 1 tablet by mouth 2 times daily for 7 days  Dispense: 14 tablet; Refill: 0    2. Need for prophylactic vaccination and inoculation against influenza    - INFLUENZA (HIGH DOSE) 3 VALENT VACCINE [51030]  - ADMIN INFLUENZA (For MEDICARE Patients ONLY) []         Michelle Gautam PA-C  [facility]      "

## 2019-12-06 NOTE — NURSING NOTE
"Chief Complaint   Patient presents with     Abdominal Pain       Initial /58   Pulse 66   Temp 97.2  F (36.2  C) (Tympanic)   Resp 16   Ht 1.702 m (5' 7\")   Wt 83.9 kg (185 lb)   SpO2 98%   BMI 28.98 kg/m   Estimated body mass index is 28.98 kg/m  as calculated from the following:    Height as of this encounter: 1.702 m (5' 7\").    Weight as of this encounter: 83.9 kg (185 lb).    Patient presents to the clinic using No DME    Health Maintenance that is potentially due pending provider review:  Flu shot     Possibly completing today per provider review.    Is there anyone who you would like to be able to receive your results? Yes  If yes have patient fill out RADHA      "

## 2020-01-20 ENCOUNTER — OFFICE VISIT (OUTPATIENT)
Dept: FAMILY MEDICINE | Facility: CLINIC | Age: 70
End: 2020-01-20
Payer: COMMERCIAL

## 2020-01-20 VITALS
SYSTOLIC BLOOD PRESSURE: 112 MMHG | BODY MASS INDEX: 28.41 KG/M2 | HEART RATE: 64 BPM | WEIGHT: 181 LBS | TEMPERATURE: 96.4 F | RESPIRATION RATE: 16 BRPM | HEIGHT: 67 IN | DIASTOLIC BLOOD PRESSURE: 64 MMHG

## 2020-01-20 DIAGNOSIS — G89.29 CHRONIC PAIN OF BOTH SHOULDERS: Primary | ICD-10-CM

## 2020-01-20 DIAGNOSIS — M25.512 CHRONIC PAIN OF BOTH SHOULDERS: Primary | ICD-10-CM

## 2020-01-20 DIAGNOSIS — M25.511 CHRONIC PAIN OF BOTH SHOULDERS: Primary | ICD-10-CM

## 2020-01-20 PROCEDURE — 99213 OFFICE O/P EST LOW 20 MIN: CPT | Performed by: NURSE PRACTITIONER

## 2020-01-20 ASSESSMENT — MIFFLIN-ST. JEOR: SCORE: 1534.64

## 2020-01-20 NOTE — PROGRESS NOTES
Subjective     Bruno Baig is a 69 year old male who presents to clinic today for the following health issues:    HPI   Musculoskeletal problem/pain      Duration: 6 months     Description  Location: bilateral shoulders     Intensity:  moderate    Accompanying signs and symptoms: radiation of pain to arms with ROM     History  Previous similar problem: YES- left shoulder   Previous evaluation:  x-ray left shoulder 7/1/2019    Precipitating or alleviating factors:  Trauma or overuse: no   Aggravating factors include: ROM     Therapies tried and outcome: nothing    PT helped initially but then no improvement in symptoms    LEFT SHOULDER 2 VIEWS  7/1/2019 3:09 PM     HISTORY:  Chronic left shoulder pain.     COMPARISON:  None.     FINDINGS:  No fracture or osseous lesion is seen. The joint spaces are  well preserved. No soft tissue pathology is seen.                                                                      IMPRESSION:  Unremarkable examination.     SIM VERONICA MD    Patient Active Problem List   Diagnosis     Acute gout, unspecified cause, unspecified site     Benign essential hypertension     Pre-diabetes     Abdominal aortic aneurysm (AAA) without rupture (H)     Advanced directives, counseling/discussion     Hyperlipidemia LDL goal <100     Tobacco abuse     Refuses tetanus, diphtheria, and acellular pertussis (Tdap) vaccination     Pneumococcal vaccination declined by patient     Anemia due to vitamin B12 deficiency, unspecified B12 deficiency type     CKD (chronic kidney disease) stage 3, GFR 30-59 ml/min (H)     Past Surgical History:   Procedure Laterality Date     COLONOSCOPY N/A 11/9/2017    Procedure: COLONOSCOPY;  colonoscopy;  Surgeon: Justice Horner MD;  Location: [facility]       Social History     Tobacco Use     Smoking status: Current Every Day Smoker     Packs/day: 1.00     Years: 30.00     Pack years: 30.00     Types: Cigarettes     Smokeless tobacco: Never Used     Tobacco  "comment: 1/2 pack daily    Substance Use Topics     Alcohol use: Yes     Comment: 5-6 daily     Family History   Problem Relation Age of Onset     Other Cancer Mother         lung     Heart Failure Father      Lung Cancer Sister          Current Outpatient Medications   Medication Sig Dispense Refill     aspirin 81 MG tablet Take by mouth daily       Cholecalciferol (VITAMIN D3) 2000 units CAPS Take 1 capsule by mouth every morning        Cyanocobalamin (B-12) 1000 MCG CAPS Take 1 capsule by mouth       fish oil-omega-3 fatty acids 1000 MG capsule Take 1 g by mouth every morning        Garlic 1000 MG CAPS Take 1 capsule by mouth every morning        gemfibrozil (LOPID) 600 MG tablet TAKE 1 TABLET (600 MG) BY MOUTH 2 TIMES DAILY 180 tablet 3     glucosamine-chondroitin 500-400 MG CAPS per capsule Take 2 capsules by mouth daily       losartan-hydrochlorothiazide (HYZAAR) 100-12.5 MG tablet Take 1 tablet by mouth daily 90 tablet 3     blood glucose monitoring (NO BRAND SPECIFIED) meter device kit 1 kit by In Vitro route 2 times daily Use to test blood sugar 2 times daily or as directed. (Patient not taking: Reported on 1/20/2020) 1 kit 0     blood glucose monitoring (NO BRAND SPECIFIED) test strip Use to test blood sugars 2 times daily or as directed (Patient not taking: Reported on 1/20/2020) 1 Box 1     No Known Allergies    Reviewed and updated as needed this visit by Provider  Tobacco  Allergies  Meds  Problems  Med Hx  Surg Hx  Fam Hx         Review of Systems   ROS COMP: Constitutional, HEENT, cardiovascular, pulmonary, gi and gu systems are negative, except as otherwise noted.      Objective    /64 (Cuff Size: Adult Regular)   Pulse 64   Temp 96.4  F (35.8  C) (Tympanic)   Resp 16   Ht 1.702 m (5' 7\")   Wt 82.1 kg (181 lb)   BMI 28.35 kg/m    Body mass index is 28.35 kg/m .  Physical Exam   GENERAL: healthy, alert and no distress  MS: bilateral shoulder with no tenderness to palpation, limited " flexion and internal rotation to hip  NEURO: Normal strength and tone, mentation intact and speech normal  PSYCH: mentation appears normal, affect normal/bright    Diagnostic Test Results:  none         Assessment & Plan     1. Chronic pain of both shoulders  With now bilateral shoulder symptoms will refer back to PT.  Ortho referral if not improving.  Symptomatic care and follow up discussed.  - PHYSICAL THERAPY REFERRAL; Future  - ORTHO  REFERRAL    Home care instructions were reviewed with the patient. The risks, benefits and treatment options of prescribed medications or other treatments have been discussed with the patient. The patient verbalized their understanding and should call or follow up if no improvement or if they develop further problems.    Return in about 4 weeks (around 2/17/2020), or if symptoms worsen or fail to improve.    ILENE Brady [facility]

## 2020-01-23 ENCOUNTER — HOSPITAL ENCOUNTER (OUTPATIENT)
Dept: PHYSICAL THERAPY | Facility: CLINIC | Age: 70
Setting detail: THERAPIES SERIES
End: 2020-01-23
Attending: NURSE PRACTITIONER
Payer: COMMERCIAL

## 2020-01-23 DIAGNOSIS — M25.511 CHRONIC PAIN OF BOTH SHOULDERS: ICD-10-CM

## 2020-01-23 DIAGNOSIS — M25.512 CHRONIC PAIN OF BOTH SHOULDERS: ICD-10-CM

## 2020-01-23 DIAGNOSIS — G89.29 CHRONIC PAIN OF BOTH SHOULDERS: ICD-10-CM

## 2020-01-23 PROCEDURE — 97140 MANUAL THERAPY 1/> REGIONS: CPT | Mod: GP | Performed by: PHYSICAL THERAPIST

## 2020-01-23 PROCEDURE — 97161 PT EVAL LOW COMPLEX 20 MIN: CPT | Mod: GP | Performed by: PHYSICAL THERAPIST

## 2020-01-23 PROCEDURE — 97110 THERAPEUTIC EXERCISES: CPT | Mod: GP | Performed by: PHYSICAL THERAPIST

## 2020-01-23 NOTE — PROGRESS NOTES
[facility]          OUTPATIENT PHYSICAL THERAPY ORTHOPEDIC EVALUATION  PLAN OF TREATMENT FOR OUTPATIENT REHABILITATION  (COMPLETE FOR INITIAL CLAIMS ONLY)  Patient's Last Name, First Name, M.I.  YOB: 1950  Bruno Baig    Provider s Name:  [facility]   Medical Record No.  [id number]   Start of Care Date:  01/23/20   Onset Date:  01/20/20   Type:     _X__PT   ___OT   ___SLP Medical Diagnosis:  Chronic pain of both shoulders M25.511, G89.29, M25.512      PT Diagnosis:  B shoulder impingement   Visits from SOC:  1      _________________________________________________________________________________  Plan of Treatment/Functional Goals:  ROM, strengthening, stretching, joint mobilization, manual therapy, gait training, balance training, neuromuscular re-education     Cryotherapy, Electrical stimulation, Hot packs, TENS, Traction, Ultrasound     Goals  Goal Identifier: Home tasks- snow blowing   Goal Description: Pt will be able to complete all home tasks w less than 2/10 shoulder pain  Target Date: 02/13/20    Goal Identifier: ROM  Goal Description: Pt will demonstrate full  GH AROM with less than 1/10 pain in order to be able to don/doff jacket/shirt to return to PLOF w/o  pain.  Target Date: 02/13/20    Goal Identifier: ROM  Goal Description: Pt will demonstrate 180 deg GH AROM flex w less than 1/10 pain to allow him to reach overhead  into cupboard to put dishes away without pain  Target Date: 03/05/20    Goal Identifier: SPADI  Goal Description: Pt will report <10% disability on SPADI to demonstrate improved ability to complete daily activities and demonstrate significant clinical improvement  Target Date: 03/05/20                    Therapy Frequency:  1 time/week  Predicted Duration of Therapy Intervention:  6 weeks    Colleen Salcedo, PT                 I CERTIFY THE NEED FOR THESE SERVICES FURNISHED UNDER        THIS PLAN OF TREATMENT AND WHILE  UNDER MY CARE     (Physician co-signature of this document indicates review and certification of the therapy plan).                       Certification Date From:  01/23/20   Certification Date To:  03/05/20    Referring Provider:  Tiffany Cooper APRN CNP     Initial Assessment        See Epic Evaluation Start of Care Date: 01/23/20

## 2020-01-23 NOTE — PROGRESS NOTES
01/23/20 1000   General Information   Type of Visit Initial OP Ortho PT Evaluation   Start of Care Date 01/23/20   Referring Physician Tiffany Cooper APRN CNP    Patient/Family Goals Statement reduce pain    Orders Evaluate and Treat   Date of Order 01/20/20   Certification Required? Yes   Medical Diagnosis Chronic pain of both shoulders M25.511, G89.29, M25.512    Surgical/Medical history reviewed Yes   Precautions/Limitations no known precautions/limitations   Body Part(s)   Body Part(s) Shoulder   Presentation and Etiology   Pertinent history of current problem (include personal factors and/or comorbidities that impact the POC) Pt relates L shoulder pain started approx. 6 mo ago, tried 1 PT visit then did HEP at home, initially helped. Hurts with shoulder abd along back side. R is also sore, Pt denies neck pain, pain raidiates from shoulder to elbow. Had xrays but no MRI. Has not had injections. PMH: high BP,   Impairments A. Pain;D. Decreased ROM;E. Decreased flexibility;F. Decreased strength and endurance   Functional Limitations perform activities of daily living;perform required work activities;perform desired leisure / sports activities   Symptom Location B shoulders   How/Where did it occur With repetition/overuse   Onset date of current episode/exacerbation 01/20/20   Chronicity New   Pain rating (0-10 point scale) Best (/10);Worst (/10)   Best (/10) 0   Worst (/10) 8   Pain quality A. Sharp;B. Dull;C. Aching   Frequency of pain/symptoms C. With activity   Pain/symptoms exacerbated by C. Lifting;D. Carrying;G. Certain positions;H. Overhead reach;J. ADL   Pain/symptoms eased by C. Rest;F. Certain positions;E. Changing positions   Progression of symptoms since onset: Worsened   Current Level of Function   Current Community Support Family/friend caregiver   Patient role/employment history F. Retired   Living environment [place]   Fall Risk Screen   Fall screen completed by PT   Have you fallen 2  or more times in the past year? No   Have you fallen and had an injury in the past year? No   Is patient a fall risk? No   Abuse Screen (yes response referral indicated)   Feels Unsafe at Home or Work/School no   Feels Threatened by Someone no   Does Anyone Try to Keep You From Having Contact with Others or Doing Things Outside Your Home? no   Physical Signs of Abuse Present no   Functional Scales   Functional Scales Other   Other Scales  SPADI: 52%    Shoulder Objective Findings   Side (if bilateral, select both right and left) Left;Right   Posture fwd head posture rounded shoulders    Shoulder Flexibility Comments Biceps 5/5    Mejia-West Test R: + L: +    Edward's Test -   Load and Shift Test -   Sulcus Test -   Crossover Test + B    Shoulder Special Tests Comments speedsL R +, L -    Palpation Denies TTP on R    Right Shoulder Flexion AROM 140   Right Shoulder Flexion PROM 120   Right Shoulder Abduction AROM 90   Right Shoulder Abduction PROM 80   Right Shoulder ER AROM C7   Right Shoulder ER PROM 70   Right Shoulder IR AROM S1   Right Shoulder IR PROM 40   Right Shoulder Flexion Strength 5/5   Right Shoulder ER Strength 3/5   Right Shoulder IR Strength 4/5   Left Shoulder Flexion AROM 140   Left Shoulder Flexion PROM 160   Left Shoulder Abduction AROM 90   Left Shoulder Abduction PROM 90   Left Shoulder ER AROM C7   Left Shoulder ER PROM C60   Left Shoulder IR AROM S1   Left Shoulder IR PROM 20   Left Shoulder Flexion Strength 5/5   Left Shoulder ER Strength 3/5   Left Shoulder IR Strength 4/5   Planned Therapy Interventions   Planned Therapy Interventions ROM;strengthening;stretching;joint mobilization;manual therapy;gait training;balance training;neuromuscular re-education   Planned Modality Interventions   Planned Modality Interventions Cryotherapy;Electrical stimulation;Hot packs;TENS;Traction;Ultrasound   Clinical Impression   Criteria for Skilled Therapeutic Interventions Met yes, treatment  indicated   PT Diagnosis B shoulder impingement   Influenced by the following impairments limited ROM, weakness,    Functional limitations due to impairments reaaching, lifting   Clinical Presentation Stable/Uncomplicated   Clinical Presentation Rationale Pt is pleasant 69 yr old male who presents w B shoulder impingemtn. Pt is motviated to get better however only attended 1 visit at prior PT session   Clinical Decision Making (Complexity) Low complexity   Therapy Frequency 1 time/week   Predicted Duration of Therapy Intervention (days/wks) 6 weeks   Risk & Benefits of therapy have been explained Yes   Patient, Family & other staff in agreement with plan of care Yes   Education Assessment   Preferred Learning Style Reading;Listening;Demonstration;Pictures/video   Barriers to Learning No barriers   ORTHO GOALS   PT Ortho Eval Goals 1;2;4;3   Ortho Goal 1   Goal Identifier Home tasks- snow blowing    Goal Description Pt will be able to complete all home tasks w less than 2/10 shoulder pain   Target Date 02/13/20   Ortho Goal 2   Goal Identifier ROM   Goal Description Pt will demonstrate full  GH AROM with less than 1/10 pain in order to be able to don/doff jacket/shirt to return to PLOF w/o  pain.   Target Date 02/13/20   Ortho Goal 3   Goal Identifier ROM   Goal Description Pt will demonstrate 180 deg GH AROM flex w less than 1/10 pain to allow him to reach overhead  into cupboard to put dishes away without pain   Target Date 03/05/20   Ortho Goal 4   Goal Identifier SPADI   Goal Description Pt will report <10% disability on SPADI to demonstrate improved ability to complete daily activities and demonstrate significant clinical improvement   Target Date 03/05/20   Total Evaluation Time   PT Eval, Low Complexity Minutes (13333) 20   Therapy Certification   Certification date from 01/23/20   Certification date to 03/05/20   Medical Diagnosis Chronic pain of both shoulders M25.511, G89.29, M25.512

## 2020-01-30 ENCOUNTER — HOSPITAL ENCOUNTER (OUTPATIENT)
Dept: PHYSICAL THERAPY | Facility: CLINIC | Age: 70
Setting detail: THERAPIES SERIES
End: 2020-01-30
Attending: NURSE PRACTITIONER
Payer: COMMERCIAL

## 2020-01-30 PROCEDURE — 97140 MANUAL THERAPY 1/> REGIONS: CPT | Mod: GP | Performed by: PHYSICAL THERAPIST

## 2020-01-30 PROCEDURE — 97110 THERAPEUTIC EXERCISES: CPT | Mod: GP | Performed by: PHYSICAL THERAPIST

## 2020-02-06 ENCOUNTER — HOSPITAL ENCOUNTER (OUTPATIENT)
Dept: PHYSICAL THERAPY | Facility: CLINIC | Age: 70
Setting detail: THERAPIES SERIES
End: 2020-02-06
Attending: NURSE PRACTITIONER
Payer: COMMERCIAL

## 2020-02-06 PROCEDURE — 97110 THERAPEUTIC EXERCISES: CPT | Mod: GP | Performed by: PHYSICAL THERAPIST

## 2020-02-06 PROCEDURE — 97140 MANUAL THERAPY 1/> REGIONS: CPT | Mod: GP | Performed by: PHYSICAL THERAPIST

## 2020-02-20 ENCOUNTER — HOSPITAL ENCOUNTER (OUTPATIENT)
Dept: PHYSICAL THERAPY | Facility: CLINIC | Age: 70
Setting detail: THERAPIES SERIES
End: 2020-02-20
Attending: NURSE PRACTITIONER
Payer: COMMERCIAL

## 2020-02-20 PROCEDURE — 97110 THERAPEUTIC EXERCISES: CPT | Mod: GP | Performed by: PHYSICAL THERAPIST

## 2020-02-20 PROCEDURE — 97140 MANUAL THERAPY 1/> REGIONS: CPT | Mod: GP | Performed by: PHYSICAL THERAPIST

## 2020-03-05 ENCOUNTER — HOSPITAL ENCOUNTER (OUTPATIENT)
Dept: PHYSICAL THERAPY | Facility: CLINIC | Age: 70
Setting detail: THERAPIES SERIES
End: 2020-03-05
Attending: NURSE PRACTITIONER
Payer: COMMERCIAL

## 2020-03-05 PROCEDURE — 97110 THERAPEUTIC EXERCISES: CPT | Mod: GP | Performed by: PHYSICAL THERAPIST

## 2020-03-05 PROCEDURE — 97140 MANUAL THERAPY 1/> REGIONS: CPT | Mod: GP | Performed by: PHYSICAL THERAPIST

## 2020-03-05 NOTE — PROGRESS NOTES
Outpatient Physical Therapy Discharge Note     Patient: Bruno Baig  : 1950    Beginning/End Dates of Reporting Period:  20 to 3/5/2020    Referring Provider: Kenneth Woody Diagnosis: B shoulder pain        Client Self Report: Pt relates shoulders are not doing good. Diverticulitis may be comign on.  Pt relates better than IE but not as good as it needs to be.     Objective Measurements:  Objective Measure: R shoulder ROM  Details: flex: 90 abd: 80 IR: R hip , ER: C7 - pain at all end ranges    Objective Measure: L shoudler ROM   Details: flex: 130 abd: 60 IR: ER: C7 IR: L    Objective Measure: shoulder MMT  Details: flex: 4/5, abd: 4/5, IR: 5/5, ER: 5/5                        Outcome Measures (most recent score):  SPADI: 46%     Goals:  Goal Identifier Home tasks- snow blowing    Goal Description Pt will be able to complete all home tasks w less than 2/10 shoulder pain   Target Date 20   Date Met      Progress:pending day     Goal Identifier ROM   Goal Description Pt will demonstrate full  GH AROM with less than 1/10 pain in order to be able to don/doff jacket/shirt to return to PLOF w/o  pain.   Target Date 20   Date Met      Progress:painful end range     Goal Identifier ROM   Goal Description Pt will demonstrate 180 deg GH AROM flex w less than 1/10 pain to allow him to reach overhead  into cupboard to put dishes away without pain   Target Date 20   Date Met      Progress:painful      Goal Identifier SPADI   Goal Description Pt will report <10% disability on SPADI to demonstrate improved ability to complete daily activities and demonstrate significant clinical improvement   Target Date 20   Date Met      Progress:improved from 52  to 46%        Progress Toward Goals:   Progress this reporting period: Pt has been seen for 5 visits over this POC. Pt demonstrates good strength B however continues to have limitations in AROM/PROM w hard painful end feels. Pt ROM has  varried throughout POC, flex varies from  deg and is not always related to activity, pt is also most limited in abd/flex/IR. Overall, pt has made small gains but due to continued pain and limited end range. Plan for patient to return to the MD for further evalation. Will hold chart open, pending f/u visit.           Plan:  Changes to therapy plan of care: Plan to return to MD for further evaluation, will hold chart pending recomendations    Discharge:  Yes to HEP as per MD note, pt is to f/u w ortho

## 2020-03-16 ENCOUNTER — ANCILLARY PROCEDURE (OUTPATIENT)
Dept: GENERAL RADIOLOGY | Facility: CLINIC | Age: 70
End: 2020-03-16
Attending: NURSE PRACTITIONER
Payer: COMMERCIAL

## 2020-03-16 ENCOUNTER — OFFICE VISIT (OUTPATIENT)
Dept: FAMILY MEDICINE | Facility: CLINIC | Age: 70
End: 2020-03-16
Payer: COMMERCIAL

## 2020-03-16 VITALS
HEIGHT: 67 IN | RESPIRATION RATE: 18 BRPM | BODY MASS INDEX: 28.06 KG/M2 | WEIGHT: 178.8 LBS | SYSTOLIC BLOOD PRESSURE: 128 MMHG | OXYGEN SATURATION: 98 % | DIASTOLIC BLOOD PRESSURE: 60 MMHG | TEMPERATURE: 97.8 F | HEART RATE: 58 BPM

## 2020-03-16 DIAGNOSIS — M25.511 CHRONIC RIGHT SHOULDER PAIN: ICD-10-CM

## 2020-03-16 DIAGNOSIS — G89.29 CHRONIC RIGHT SHOULDER PAIN: ICD-10-CM

## 2020-03-16 DIAGNOSIS — G89.29 CHRONIC RIGHT SHOULDER PAIN: Primary | ICD-10-CM

## 2020-03-16 DIAGNOSIS — M25.511 CHRONIC RIGHT SHOULDER PAIN: Primary | ICD-10-CM

## 2020-03-16 DIAGNOSIS — M25.512 CHRONIC PAIN OF BOTH SHOULDERS: ICD-10-CM

## 2020-03-16 DIAGNOSIS — M25.511 CHRONIC PAIN OF BOTH SHOULDERS: ICD-10-CM

## 2020-03-16 DIAGNOSIS — G89.29 CHRONIC PAIN OF BOTH SHOULDERS: ICD-10-CM

## 2020-03-16 PROCEDURE — 99214 OFFICE O/P EST MOD 30 MIN: CPT | Performed by: NURSE PRACTITIONER

## 2020-03-16 PROCEDURE — 73030 X-RAY EXAM OF SHOULDER: CPT | Mod: RT

## 2020-03-16 ASSESSMENT — MIFFLIN-ST. JEOR: SCORE: 1524.66

## 2020-03-16 NOTE — PROGRESS NOTES
.Subjective     Bruno Baig is a 69 year old male who presents to clinic today for the following health issues:    HPI   Musculoskeletal problem/pain      Duration: X ongoing     Description  Location: bilateral shoulder pain    Intensity:  4-5/10 with over extending, resting there is no pain.     Accompanying signs and symptoms: none    History  Previous similar problem: YES  Previous evaluation:  none    Precipitating or alleviating factors:  Trauma or overuse: no   Aggravating factors include: none    Therapies tried and outcome: rest/inactivity, stretching, exercises and physical therapy      Patient Active Problem List   Diagnosis     Acute gout, unspecified cause, unspecified site     Benign essential hypertension     Pre-diabetes     Abdominal aortic aneurysm (AAA) without rupture (H)     Advanced directives, counseling/discussion     Hyperlipidemia LDL goal <100     Tobacco abuse     Refuses tetanus, diphtheria, and acellular pertussis (Tdap) vaccination     Pneumococcal vaccination declined by patient     Anemia due to vitamin B12 deficiency, unspecified B12 deficiency type     CKD (chronic kidney disease) stage 3, GFR 30-59 ml/min (H)     Past Surgical History:   Procedure Laterality Date     COLONOSCOPY N/A 11/9/2017    Procedure: COLONOSCOPY;  colonoscopy;  Surgeon: Justice Horner MD;  Location: [facility]       Social History     Tobacco Use     Smoking status: Current Every Day Smoker     Packs/day: 0.50     Years: 30.00     Pack years: 15.00     Types: Cigarettes     Smokeless tobacco: Never Used     Tobacco comment: 1/2 pack daily    Substance Use Topics     Alcohol use: Yes     Comment: 5-6 daily     Family History   Problem Relation Age of Onset     Other Cancer Mother         lung     Heart Failure Father      Lung Cancer Sister          Current Outpatient Medications   Medication Sig Dispense Refill     aspirin 81 MG tablet Take by mouth daily       Cholecalciferol (VITAMIN D3) 2000 units CAPS  "Take 1 capsule by mouth every morning        Cyanocobalamin (B-12) 1000 MCG CAPS Take 1 capsule by mouth       fish oil-omega-3 fatty acids 1000 MG capsule Take 1 g by mouth every morning        Garlic 1000 MG CAPS Take 1 capsule by mouth every morning        gemfibrozil (LOPID) 600 MG tablet TAKE 1 TABLET (600 MG) BY MOUTH 2 TIMES DAILY 180 tablet 3     glucosamine-chondroitin 500-400 MG CAPS per capsule Take 2 capsules by mouth daily       losartan-hydrochlorothiazide (HYZAAR) 100-12.5 MG tablet Take 1 tablet by mouth daily 90 tablet 3     blood glucose monitoring (NO BRAND SPECIFIED) meter device kit 1 kit by In [employer] route 2 times daily Use to test blood sugar 2 times daily or as directed. (Patient not taking: Reported on 1/20/2020) 1 kit 0     blood glucose monitoring (NO BRAND SPECIFIED) test strip Use to test blood sugars 2 times daily or as directed (Patient not taking: Reported on 1/20/2020) 1 Box 1     No Known Allergies    Reviewed and updated as needed this visit by Provider  Tobacco  Allergies  Meds  Problems  Med Hx  Surg Hx  Fam Hx         Review of Systems   ROS COMP: Constitutional, HEENT, cardiovascular, pulmonary, gi and gu systems are negative, except as otherwise noted.      Objective    /60   Pulse 58   Temp 97.8  F (36.6  C) (Tympanic)   Resp 18   Ht 1.702 m (5' 7\")   Wt 81.1 kg (178 lb 12.8 oz)   SpO2 98%   BMI 28.00 kg/m    Body mass index is 28 kg/m .  Physical Exam   GENERAL: healthy, alert and no distress  MS: bilateral shoulder with no tenderness to palpation, limited flexion and internal rotation to hip  NEURO: Normal strength and tone, mentation intact and speech normal  PSYCH: mentation appears normal, affect normal/bright    Diagnostic Test Results:  Xray - unremarkable per ILENE Brady CNP read        Assessment & Plan     1. Chronic right shoulder pain  X ray unremarkable per ILENE Brady CNP read. With no improvement with PT recommend ortho " evaluation at this time.  Symptomatic care and follow up discussed.  - XR Shoulder Right 2 Views; Future  - Orthopedic & Spine  Referral; Future    2. Chronic pain of both shoulders  Per above.     Home care instructions were reviewed with the patient. The risks, benefits and treatment options of prescribed medications or other treatments have been discussed with the patient. The patient verbalized their understanding and should call or follow up if no improvement or if they develop further problems.    Return in about 4 weeks (around 4/13/2020), or if symptoms worsen or fail to improve.    ILENE Brady [facility]

## 2020-06-09 DIAGNOSIS — I10 BENIGN ESSENTIAL HYPERTENSION: ICD-10-CM

## 2020-06-09 RX ORDER — LOSARTAN POTASSIUM AND HYDROCHLOROTHIAZIDE 12.5; 1 MG/1; MG/1
TABLET ORAL
Qty: 90 TABLET | Refills: 3 | Status: SHIPPED | OUTPATIENT
Start: 2020-06-09 | End: 2021-06-15

## 2020-07-14 ENCOUNTER — OFFICE VISIT (OUTPATIENT)
Dept: FAMILY MEDICINE | Facility: CLINIC | Age: 70
End: 2020-07-14
Payer: COMMERCIAL

## 2020-07-14 VITALS
HEART RATE: 64 BPM | TEMPERATURE: 96.1 F | BODY MASS INDEX: 26.68 KG/M2 | SYSTOLIC BLOOD PRESSURE: 128 MMHG | RESPIRATION RATE: 18 BRPM | DIASTOLIC BLOOD PRESSURE: 72 MMHG | HEIGHT: 67 IN | WEIGHT: 170 LBS

## 2020-07-14 DIAGNOSIS — I71.40 ABDOMINAL AORTIC ANEURYSM (AAA) WITHOUT RUPTURE (H): ICD-10-CM

## 2020-07-14 DIAGNOSIS — I10 BENIGN ESSENTIAL HYPERTENSION: ICD-10-CM

## 2020-07-14 DIAGNOSIS — D51.9 ANEMIA DUE TO VITAMIN B12 DEFICIENCY, UNSPECIFIED B12 DEFICIENCY TYPE: ICD-10-CM

## 2020-07-14 DIAGNOSIS — N18.30 CKD (CHRONIC KIDNEY DISEASE) STAGE 3, GFR 30-59 ML/MIN (H): ICD-10-CM

## 2020-07-14 DIAGNOSIS — F17.219 CIGARETTE NICOTINE DEPENDENCE WITH NICOTINE-INDUCED DISORDER: ICD-10-CM

## 2020-07-14 DIAGNOSIS — Z00.00 MEDICARE ANNUAL WELLNESS VISIT, SUBSEQUENT: Primary | ICD-10-CM

## 2020-07-14 LAB
ALBUMIN SERPL-MCNC: 4.2 G/DL (ref 3.4–5)
ALP SERPL-CCNC: 54 U/L (ref 40–150)
ALT SERPL W P-5'-P-CCNC: 16 U/L (ref 0–70)
ANION GAP SERPL CALCULATED.3IONS-SCNC: 6 MMOL/L (ref 3–14)
AST SERPL W P-5'-P-CCNC: 22 U/L (ref 0–45)
BILIRUB SERPL-MCNC: 0.4 MG/DL (ref 0.2–1.3)
BUN SERPL-MCNC: 27 MG/DL (ref 7–30)
CALCIUM SERPL-MCNC: 10.6 MG/DL (ref 8.5–10.1)
CHLORIDE SERPL-SCNC: 105 MMOL/L (ref 94–109)
CO2 SERPL-SCNC: 25 MMOL/L (ref 20–32)
CREAT SERPL-MCNC: 1.32 MG/DL (ref 0.66–1.25)
GFR SERPL CREATININE-BSD FRML MDRD: 54 ML/MIN/{1.73_M2}
GLUCOSE SERPL-MCNC: 100 MG/DL (ref 70–99)
POTASSIUM SERPL-SCNC: 4.6 MMOL/L (ref 3.4–5.3)
PROT SERPL-MCNC: 8.5 G/DL (ref 6.8–8.8)
SODIUM SERPL-SCNC: 136 MMOL/L (ref 133–144)
VIT B12 SERPL-MCNC: 482 PG/ML (ref 193–986)

## 2020-07-14 PROCEDURE — 99214 OFFICE O/P EST MOD 30 MIN: CPT | Mod: 25 | Performed by: FAMILY MEDICINE

## 2020-07-14 PROCEDURE — 99397 PER PM REEVAL EST PAT 65+ YR: CPT | Performed by: FAMILY MEDICINE

## 2020-07-14 PROCEDURE — 36415 COLL VENOUS BLD VENIPUNCTURE: CPT | Performed by: FAMILY MEDICINE

## 2020-07-14 PROCEDURE — 80053 COMPREHEN METABOLIC PANEL: CPT | Performed by: FAMILY MEDICINE

## 2020-07-14 PROCEDURE — 82607 VITAMIN B-12: CPT | Performed by: FAMILY MEDICINE

## 2020-07-14 ASSESSMENT — ENCOUNTER SYMPTOMS
HEMATURIA: 0
DIZZINESS: 0
FEVER: 0
EYE PAIN: 0
HEADACHES: 0
JOINT SWELLING: 0
FREQUENCY: 0
CONSTIPATION: 0
NAUSEA: 0
PALPITATIONS: 0
ABDOMINAL PAIN: 0
ARTHRALGIAS: 0
SHORTNESS OF BREATH: 0
COUGH: 0
HEARTBURN: 0
DYSURIA: 0
DIARRHEA: 0
CHILLS: 0
WEAKNESS: 0
SORE THROAT: 0
NERVOUS/ANXIOUS: 0
MYALGIAS: 0
PARESTHESIAS: 0
HEMATOCHEZIA: 0

## 2020-07-14 ASSESSMENT — ACTIVITIES OF DAILY LIVING (ADL): CURRENT_FUNCTION: NO ASSISTANCE NEEDED

## 2020-07-14 ASSESSMENT — MIFFLIN-ST. JEOR: SCORE: 1484.74

## 2020-07-14 NOTE — PROGRESS NOTES
"SUBJECTIVE:   Bruno Baig is a 69 year old male who presents for Preventive Visit.  Are you in the first 12 months of your Medicare coverage?  No    Healthy Habits:     In general, how would you rate your overall health?  Good    Frequency of exercise:  2-3 days/week    Duration of exercise:  Less than 15 minutes    Do you usually eat at least 4 servings of fruit and vegetables a day, include whole grains    & fiber and avoid regularly eating high fat or \"junk\" foods?  No    Taking medications regularly:  Yes    Medication side effects:  None    Ability to successfully perform activities of daily living:  No assistance needed    Home Safety:  Lack of grab bars in the bathroom    Hearing Impairment:  Difficulty following a conversation in a noisy restaurant or crowded room    In the past 6 months, have you been bothered by leaking of urine?  No    In general, how would you rate your overall mental or emotional health?  Good      PHQ-2 Total Score: 0    Additional concerns today:  No    Do you feel safe in your environment? Yes    Have you ever done Advance Care Planning? (For example, a Health Directive, POLST, or a discussion with a medical provider or your loved ones about your wishes): No, advance care planning information given to patient to review.  Patient plans to discuss their wishes with loved ones or provider.      Fall risk  Fallen 2 or more times in the past year?: No  Any fall with injury in the past year?: No    Cognitive Screening   1) Repeat 3 items (Leader, Season, Table)    2) Clock draw: NORMAL  3) 3 item recall: Recalls 3 objects  Results: 3 items recalled: COGNITIVE IMPAIRMENT LESS LIKELY    Mini-CogTM Sofie Jauregui. Licensed by the author for use in [facility]; reprinted with permission (nina@.[email or web address]). All rights reserved.      Do you have sleep apnea, excessive snoring or daytime drowsiness?: no    Reviewed and updated as needed this visit by clinical staff     "     Reviewed and updated as needed this visit by Provider        Social History     Tobacco Use     Smoking status: Current Every Day Smoker     Packs/day: 0.50     Years: 30.00     Pack years: 15.00     Types: Cigarettes     Smokeless tobacco: Never Used     Tobacco comment: 1/2 pack daily    Substance Use Topics     Alcohol use: Yes     Comment: 5-6 daily     If you drink alcohol do you typically have >3 drinks per day or >7 drinks per week? Yes      Alcohol Use 7/14/2020   Prescreen: >3 drinks/day or >7 drinks/week? Yes   Prescreen: >3 drinks/day or >7 drinks/week? -   AUDIT SCORE  6     AUDIT - Alcohol Use Disorders Identification Test - Reproduced from the World Health Organization Audit 2001 (Second Edition) 7/14/2020   1.  How often do you have a drink containing alcohol? 4 or more times a week   2.  How many drinks containing alcohol do you have on a typical day when you are drinking? 3 or 4   3.  How often do you have five or more drinks on one occasion? Less than monthly   4.  How often during the last year have you found that you were not able to stop drinking once you had started? Never   5.  How often during the last year have you failed to do what was normally expected of you because of drinking? Never   6.  How often during the last year have you needed a first drink in the morning to get yourself going after a heavy drinking session? Never   7.  How often during the last year have you had a feeling of guilt or remorse after drinking? Never   8.  How often during the last year have you been unable to remember what happened the night before because of your drinking? Never   9.  Have you or someone else been injured because of your drinking? No   10. Has a relative, friend, doctor or other health care worker been concerned about your drinking or suggested you cut down? No   TOTAL SCORE 6     Discussed at length and pt does not want to make any changes     Hypertension Follow-up      Do you check your  blood pressure regularly outside of the clinic? No     Are you following a low salt diet? No    Are your blood pressures ever more than 140 on the top number (systolic) OR more   than 90 on the bottom number (diastolic), for example 140/90? unsurer    Chronic Kidney Disease Follow-up      Do you take any over the counter pain medicine?: No      Current providers sharing in care for this patient include:  Patient Care Team:  Charisse Lopez MD as PCP - General (Family Practice)  Tiffany Cooper APRN CNP as Assigned PCP    The following health maintenance items are reviewed in Epic and correct as of today:  Health Maintenance   Topic Date Due     DTAP/TDAP/TD IMMUNIZATION (1 - Tdap) 11/19/1973     ZOSTER IMMUNIZATION (1 of 2) 11/19/1998     PNEUMOCOCCAL IMMUNIZATION 65+ LOW/MEDIUM RISK (1 of 2 - PCV13) 11/19/2013     FALL RISK ASSESSMENT  07/01/2020     LUNG CANCER SCREENING ANNUAL  07/03/2020     MEDICARE ANNUAL WELLNESS VISIT  07/01/2020     INFLUENZA VACCINE (1) 09/01/2020     ADVANCE CARE PLANNING  07/24/2022     LIPID  07/01/2024     COLORECTAL CANCER SCREENING  11/09/2027     HEPATITIS C SCREENING  Completed     PHQ-2  Completed     AORTIC ANEURYSM SCREENING (SYSTEM ASSIGNED)  Completed     IPV IMMUNIZATION  Aged Out     MENINGITIS IMMUNIZATION  Aged Out     Labs reviewed in EPIC      Review of Systems   Constitutional: Negative for chills and fever.   HENT: Negative for congestion, ear pain, hearing loss and sore throat.    Eyes: Negative for pain and visual disturbance.   Respiratory: Negative for cough and shortness of breath.    Cardiovascular: Negative for chest pain, palpitations and peripheral edema.   Gastrointestinal: Negative for abdominal pain, constipation, diarrhea, heartburn, hematochezia and nausea.   Genitourinary: Positive for impotence. Negative for discharge, dysuria, frequency, genital sores, hematuria and urgency.   Musculoskeletal: Negative for arthralgias, joint swelling and  "myalgias.   Skin: Negative for rash.   Neurological: Negative for dizziness, weakness, headaches and paresthesias.   Psychiatric/Behavioral: Negative for mood changes. The patient is not nervous/anxious.          OBJECTIVE:   There were no vitals taken for this visit. Estimated body mass index is 28 kg/m  as calculated from the following:    Height as of 3/16/20: 1.702 m (5' 7\").    Weight as of 3/16/20: 81.1 kg (178 lb 12.8 oz).  Physical Exam  GENERAL: healthy, alert and no distress  EYES: Eyes grossly normal to inspection, PERRL and conjunctivae and sclerae normal  HENT: ear canals and TM's normal, nose and mouth without ulcers or lesions  NECK: no adenopathy, no asymmetry, masses, or scars and thyroid normal to palpation  RESP: lungs clear to auscultation - no rales, rhonchi or wheezes  CV: regular rate and rhythm, normal S1 S2, no S3 or S4, no murmur, click or rub, no peripheral edema and peripheral pulses strong  ABDOMEN: soft, nontender, no hepatosplenomegaly, no masses and bowel sounds normal  MS: no gross musculoskeletal defects noted, no edema  SKIN: no suspicious lesions or rashes  NEURO: Normal strength and tone, mentation intact and speech normal  PSYCH: mentation appears normal, affect normal/bright    Diagnostic Test Results:  Labs reviewed in Epic    ASSESSMENT / PLAN:   1. Medicare annual wellness visit, subsequent      2. Anemia due to vitamin B12 deficiency, unspecified B12 deficiency type  Adjust meds as indicated by above labs.   - Vitamin B12    3. Benign essential hypertension  Stable no change in treatment plan.   - Comprehensive metabolic panel    4. Abdominal aortic aneurysm (AAA) without rupture (H)    - US Abdominal Aorta Imaging; Future    5. CKD (chronic kidney disease) stage 3, GFR 30-59 ml/min (H)  Stable no change in treatment plan.     6. Cigarette nicotine dependence with nicotine-induced disorder    - CT Chest Lung Cancer Scrn Low Dose wo; Future    COUNSELING:  Reviewed " "preventive health counseling, as reflected in patient instructions    Estimated body mass index is 28 kg/m  as calculated from the following:    Height as of 3/16/20: 1.702 m (5' 7\").    Weight as of 3/16/20: 81.1 kg (178 lb 12.8 oz).         reports that he has been smoking cigarettes. He has a 15.00 pack-year smoking history. He has never used smokeless tobacco.  Tobacco Cessation Action Plan: Information offered: Patient not interested at this time    Appropriate preventive services were discussed with this patient, including applicable screening as appropriate for cardiovascular disease, diabetes, osteopenia/osteoporosis, and glaucoma.  As appropriate for age/gender, discussed screening for colorectal cancer, prostate cancer, breast cancer, and cervical cancer. Checklist reviewing preventive services available has been given to the patient.    Reviewed patients plan of care and provided an AVS. The Basic Care Plan (routine screening as documented in Health Maintenance) for Bruno meets the Care Plan requirement. This Care Plan has been established and reviewed with the Patient.    Counseling Resources:  ATP IV Guidelines  Pooled Cohorts Equation Calculator  Breast Cancer Risk Calculator  FRAX Risk Assessment  ICSI Preventive Guidelines  Dietary Guidelines for Americans, 2010  W[employer]'s MyPlate  ASA Prophylaxis  Lung CA Screening    Charisse Lopez MD  [facility]    Identified Health Risks:  "

## 2020-07-31 ENCOUNTER — HOSPITAL ENCOUNTER (OUTPATIENT)
Dept: ULTRASOUND IMAGING | Facility: CLINIC | Age: 70
End: 2020-07-31
Attending: FAMILY MEDICINE
Payer: COMMERCIAL

## 2020-07-31 ENCOUNTER — HOSPITAL ENCOUNTER (OUTPATIENT)
Dept: CT IMAGING | Facility: CLINIC | Age: 70
End: 2020-07-31
Attending: FAMILY MEDICINE
Payer: COMMERCIAL

## 2020-07-31 DIAGNOSIS — I71.40 ABDOMINAL AORTIC ANEURYSM (AAA) WITHOUT RUPTURE (H): ICD-10-CM

## 2020-07-31 DIAGNOSIS — F17.219 CIGARETTE NICOTINE DEPENDENCE WITH NICOTINE-INDUCED DISORDER: ICD-10-CM

## 2020-07-31 PROCEDURE — 76775 US EXAM ABDO BACK WALL LIM: CPT

## 2020-07-31 PROCEDURE — G0297 LDCT FOR LUNG CA SCREEN: HCPCS

## 2020-08-27 DIAGNOSIS — I25.10 CORONARY ARTERY DISEASE INVOLVING NATIVE HEART WITHOUT ANGINA PECTORIS, UNSPECIFIED VESSEL OR LESION TYPE: Primary | ICD-10-CM

## 2020-09-02 ENCOUNTER — TELEPHONE (OUTPATIENT)
Dept: FAMILY MEDICINE | Facility: CLINIC | Age: 70
End: 2020-09-02

## 2020-09-02 DIAGNOSIS — E78.5 HYPERLIPIDEMIA LDL GOAL <100: Primary | ICD-10-CM

## 2020-09-02 NOTE — TELEPHONE ENCOUNTER
Talked to patient regarding econsult   He has NO symptoms at all and we talked extensively about what to watch he denied all    He will consider not smoking we talked extensively about options  He will come in for fasting lipid panel  I advised we would likely retry stating therapy as he has side affects in the past     He will monitor BP at home

## 2020-09-09 DIAGNOSIS — E78.5 HYPERLIPIDEMIA LDL GOAL <100: ICD-10-CM

## 2020-09-09 LAB
CHOLEST SERPL-MCNC: 245 MG/DL
HDLC SERPL-MCNC: 39 MG/DL
LDLC SERPL CALC-MCNC: 151 MG/DL
NONHDLC SERPL-MCNC: 206 MG/DL
TRIGL SERPL-MCNC: 275 MG/DL

## 2020-09-09 PROCEDURE — 36415 COLL VENOUS BLD VENIPUNCTURE: CPT | Performed by: FAMILY MEDICINE

## 2020-09-09 PROCEDURE — 80061 LIPID PANEL: CPT | Performed by: FAMILY MEDICINE

## 2020-09-13 DIAGNOSIS — E78.5 HYPERLIPIDEMIA LDL GOAL <100: Primary | ICD-10-CM

## 2020-09-13 NOTE — TELEPHONE ENCOUNTER
Please order Lipitor 20 mg daily per Dr. Lopez. Send to pharmacy [facility] [address] in [place]. Thank you.

## 2020-09-15 ENCOUNTER — TELEPHONE (OUTPATIENT)
Dept: FAMILY MEDICINE | Facility: CLINIC | Age: 70
End: 2020-09-15

## 2020-09-15 DIAGNOSIS — E78.5 HYPERLIPIDEMIA LDL GOAL <100: Primary | ICD-10-CM

## 2020-09-15 DIAGNOSIS — I10 BENIGN ESSENTIAL HYPERTENSION: ICD-10-CM

## 2020-09-15 RX ORDER — ATORVASTATIN CALCIUM 20 MG/1
20 TABLET, FILM COATED ORAL DAILY
Qty: 90 TABLET | Refills: 0 | Status: SHIPPED | OUTPATIENT
Start: 2020-09-15 | End: 2020-12-09

## 2020-09-15 RX ORDER — GEMFIBROZIL 600 MG/1
TABLET, FILM COATED ORAL
Qty: 180 TABLET | Refills: 3 | Status: SHIPPED | OUTPATIENT
Start: 2020-09-15 | End: 2020-09-15 | Stop reason: ALTCHOICE

## 2020-09-15 NOTE — TELEPHONE ENCOUNTER
Reason for Call:  Other     Detailed comments: Below are the patients lab results - info given to patient and he needs the Lipitor called in and he knows to stop the Lopid.      Charisse Lopez MD   9/10/2020  1:39 PM       Please call pt and let him know that his cholesterol is high. We discussed this on the phone last week. I would like him to start a statin. lipitor 20 mg daily and have him stop the lopid they will interact so do not want him on both and the lipitor will work better in terms of prevention of heart issues. He should repeat flp in 2 months   Please order future flp   I am happy to talk to him again if needed but we did talk about all of this last week                Phone Number Patient can be reached at: Home number on file [phone] (home)    Best Time:     Can we leave a detailed message on this number? YES    Call taken on 9/15/2020 at 2:06 PM by Josephine Santillan

## 2020-11-13 DIAGNOSIS — E78.5 HYPERLIPIDEMIA LDL GOAL <100: ICD-10-CM

## 2020-11-13 RX ORDER — ATORVASTATIN CALCIUM 20 MG/1
20 TABLET, FILM COATED ORAL DAILY
Qty: 90 TABLET | Refills: 0 | OUTPATIENT
Start: 2020-11-13

## 2020-11-13 RX ORDER — ATORVASTATIN CALCIUM 20 MG/1
20 TABLET, FILM COATED ORAL DAILY
Qty: 30 TABLET | Refills: 0 | Status: SHIPPED | OUTPATIENT
Start: 2020-11-13 | End: 2021-01-12

## 2020-11-16 DIAGNOSIS — E78.5 HYPERLIPIDEMIA LDL GOAL <100: ICD-10-CM

## 2020-11-16 LAB
CHOLEST SERPL-MCNC: 182 MG/DL
HDLC SERPL-MCNC: 45 MG/DL
LDLC SERPL CALC-MCNC: ABNORMAL MG/DL
LDLC SERPL DIRECT ASSAY-MCNC: 97 MG/DL
NONHDLC SERPL-MCNC: 137 MG/DL
TRIGL SERPL-MCNC: 449 MG/DL

## 2020-11-16 PROCEDURE — 83721 ASSAY OF BLOOD LIPOPROTEIN: CPT | Mod: 59 | Performed by: FAMILY MEDICINE

## 2020-11-16 PROCEDURE — 80061 LIPID PANEL: CPT | Performed by: FAMILY MEDICINE

## 2020-12-09 DIAGNOSIS — E78.5 HYPERLIPIDEMIA LDL GOAL <100: ICD-10-CM

## 2020-12-09 RX ORDER — ATORVASTATIN CALCIUM 20 MG/1
20 TABLET, FILM COATED ORAL DAILY
Qty: 90 TABLET | Refills: 0 | Status: SHIPPED | OUTPATIENT
Start: 2020-12-09 | End: 2021-02-09

## 2021-01-12 ENCOUNTER — OFFICE VISIT (OUTPATIENT)
Dept: FAMILY MEDICINE | Facility: CLINIC | Age: 71
End: 2021-01-12
Payer: COMMERCIAL

## 2021-01-12 VITALS
SYSTOLIC BLOOD PRESSURE: 132 MMHG | RESPIRATION RATE: 18 BRPM | TEMPERATURE: 97 F | DIASTOLIC BLOOD PRESSURE: 58 MMHG | WEIGHT: 182.6 LBS | HEIGHT: 67 IN | BODY MASS INDEX: 28.66 KG/M2 | HEART RATE: 68 BPM

## 2021-01-12 DIAGNOSIS — N18.31 STAGE 3A CHRONIC KIDNEY DISEASE (H): ICD-10-CM

## 2021-01-12 DIAGNOSIS — E78.5 HYPERLIPIDEMIA LDL GOAL <100: Primary | ICD-10-CM

## 2021-01-12 DIAGNOSIS — M1A.0790 CHRONIC IDIOPATHIC GOUT INVOLVING TOE WITHOUT TOPHUS, UNSPECIFIED LATERALITY: ICD-10-CM

## 2021-01-12 LAB
ANION GAP SERPL CALCULATED.3IONS-SCNC: 5 MMOL/L (ref 3–14)
BUN SERPL-MCNC: 35 MG/DL (ref 7–30)
CALCIUM SERPL-MCNC: 10.4 MG/DL (ref 8.5–10.1)
CHLORIDE SERPL-SCNC: 107 MMOL/L (ref 94–109)
CHOLEST SERPL-MCNC: 188 MG/DL
CO2 SERPL-SCNC: 25 MMOL/L (ref 20–32)
CREAT SERPL-MCNC: 1.32 MG/DL (ref 0.66–1.25)
GFR SERPL CREATININE-BSD FRML MDRD: 53 ML/MIN/{1.73_M2}
GLUCOSE SERPL-MCNC: 120 MG/DL (ref 70–99)
HDLC SERPL-MCNC: 45 MG/DL
LDLC SERPL CALC-MCNC: ABNORMAL MG/DL
LDLC SERPL DIRECT ASSAY-MCNC: 95 MG/DL
NONHDLC SERPL-MCNC: 143 MG/DL
POTASSIUM SERPL-SCNC: 4.6 MMOL/L (ref 3.4–5.3)
SODIUM SERPL-SCNC: 137 MMOL/L (ref 133–144)
TRIGL SERPL-MCNC: 498 MG/DL

## 2021-01-12 PROCEDURE — 83721 ASSAY OF BLOOD LIPOPROTEIN: CPT | Mod: 59 | Performed by: PHYSICIAN ASSISTANT

## 2021-01-12 PROCEDURE — 80061 LIPID PANEL: CPT | Performed by: PHYSICIAN ASSISTANT

## 2021-01-12 PROCEDURE — 80048 BASIC METABOLIC PNL TOTAL CA: CPT | Performed by: PHYSICIAN ASSISTANT

## 2021-01-12 PROCEDURE — 99214 OFFICE O/P EST MOD 30 MIN: CPT | Performed by: PHYSICIAN ASSISTANT

## 2021-01-12 PROCEDURE — 36415 COLL VENOUS BLD VENIPUNCTURE: CPT | Performed by: PHYSICIAN ASSISTANT

## 2021-01-12 RX ORDER — INDOMETHACIN 25 MG/1
25 CAPSULE ORAL
Qty: 90 CAPSULE | Refills: 1 | Status: SHIPPED | OUTPATIENT
Start: 2021-01-12 | End: 2021-07-30

## 2021-01-12 ASSESSMENT — MIFFLIN-ST. JEOR: SCORE: 1536.9

## 2021-01-12 NOTE — PATIENT INSTRUCTIONS
We refilled your Indomethacin.     I, or Dr. Lopez will call you with your cholesterol and kidney function lab results.     Continue all other medications at this time.

## 2021-01-12 NOTE — PROGRESS NOTES
"  Assessment & Plan   Chronic idiopathic gout involving toe without tophus, unspecified laterality  Stable. No recent flare. Needs refill of Indomethacin he takes prn. Refilled today.   - indomethacin (INDOCIN) 25 MG capsule; Take 1 capsule (25 mg) by mouth 3 times daily (with meals)    Hyperlipidemia LDL goal <100  Due for lipid panel after starting Atorvastatin and increasing his dose. Lipid panel ordered today.   - Lipid panel reflex to direct LDL Fasting    Stage 3a chronic kidney disease  Due for BMP. Ordered today.   - Basic metabolic panel     BMI:   Estimated body mass index is 28.6 kg/m  as calculated from the following:    Height as of this encounter: 1.702 m (5' 7\").    Weight as of this encounter: 82.8 kg (182 lb 9.6 oz).   Weight management plan: Patient was referred to their PCP to discuss a diet and exercise plan.    Return in about 6 months (around 2021) for Physical Exam.    ANGELA Flanagan [facility]    Jackie Carr is a 70 year old who presents to clinic today for the following health issues     HPI   Concern - Gout  Onset: No current symptoms. Hx of gout for 10+ years   Description: Here for a refill of his gout medication. Is not having current issues but realized that his medication was    Intensity: mild  Progression of Symptoms:  None   Accompanying Signs & Symptoms: na   Previous history of similar problem: 10+ year  Precipitating factors:        Worsened by: na  Alleviating factors:        Improved by: Indomethacin   Therapies tried and outcome: Indomethacin when gout flares     Pt is also due for lab work for hyperlipidemia and CKD.     Review of Systems   Constitutional, HEENT, cardiovascular, pulmonary, gi and gu systems are negative, except as otherwise noted.       Objective    /58 (BP Location: Right arm, Patient Position: Sitting, Cuff Size: Adult Large)   Pulse 68   Temp 97  F (36.1  C) (Tympanic)   Resp 18   Ht 1.702 " "m (5' 7\")   Wt 82.8 kg (182 lb 9.6 oz)   BMI 28.60 kg/m    Body mass index is 28.6 kg/m .  Physical Exam   Constitutional: healthy, alert, and no distress  Head: Normocephalic. Atraumatic  Eyes: No conjunctival injection, sclera anicteric  Cardiovascular: RRR. No murmurs, clicks, gallops, or rubs. No peripheral edema.   Respiratory: No resp distress. Lungs CTAB bilaterally anteriorly   Musculoskeletal: extremities normal- no gross deformities noted, and normal muscle tone  Skin: no suspicious lesions or rashes  Neurologic: Gait normal. CN 2-12 grossly intact  Psychiatric: mentation appears normal and affect normal/bright           "

## 2021-01-22 ENCOUNTER — OFFICE VISIT (OUTPATIENT)
Dept: FAMILY MEDICINE | Facility: CLINIC | Age: 71
End: 2021-01-22
Payer: COMMERCIAL

## 2021-01-22 VITALS
TEMPERATURE: 98.6 F | SYSTOLIC BLOOD PRESSURE: 140 MMHG | DIASTOLIC BLOOD PRESSURE: 80 MMHG | RESPIRATION RATE: 16 BRPM | HEIGHT: 67 IN | BODY MASS INDEX: 28.56 KG/M2 | WEIGHT: 182 LBS

## 2021-01-22 DIAGNOSIS — L08.9 INFECTED SEBACEOUS CYST: Primary | ICD-10-CM

## 2021-01-22 DIAGNOSIS — L72.3 INFECTED SEBACEOUS CYST: Primary | ICD-10-CM

## 2021-01-22 PROCEDURE — 87186 SC STD MICRODIL/AGAR DIL: CPT | Performed by: FAMILY MEDICINE

## 2021-01-22 PROCEDURE — 10060 I&D ABSCESS SIMPLE/SINGLE: CPT | Performed by: FAMILY MEDICINE

## 2021-01-22 PROCEDURE — 99213 OFFICE O/P EST LOW 20 MIN: CPT | Mod: 25 | Performed by: FAMILY MEDICINE

## 2021-01-22 PROCEDURE — 87077 CULTURE AEROBIC IDENTIFY: CPT | Performed by: FAMILY MEDICINE

## 2021-01-22 PROCEDURE — 87147 CULTURE TYPE IMMUNOLOGIC: CPT | Performed by: FAMILY MEDICINE

## 2021-01-22 PROCEDURE — 87070 CULTURE OTHR SPECIMN AEROBIC: CPT | Performed by: FAMILY MEDICINE

## 2021-01-22 RX ORDER — CEPHALEXIN 500 MG/1
500 CAPSULE ORAL 3 TIMES DAILY
Qty: 30 CAPSULE | Refills: 0 | Status: SHIPPED | OUTPATIENT
Start: 2021-01-22 | End: 2021-02-01

## 2021-01-22 ASSESSMENT — MIFFLIN-ST. JEOR: SCORE: 1534.18

## 2021-01-22 NOTE — PROGRESS NOTES
SUBJECTIVE:  70 year old male presents with abscess formation on back for 2 weeks.  No   fever or chills.  No history of diabetes.    OBJECTIVE:  Fluctuant abscess noted on the back.  Size 3x2 cm. There is   surrounding induration, erythema and tenderness. Afebrile.    ASSESSMENT:  Sebaceous cyst with abscess formation.      ICD-10-CM    1. Infected sebaceous cyst  L72.3 cephALEXin (KEFLEX) 500 MG capsule    L08.9 GENERAL SURG ADULT REFERRAL     Wound Culture Aerobic Bacterial     PLAN:  After informed consent was obtained, using Betadine for cleansing   and 1% Lidocaine  with epinephrine for anesthetic, with sterile   technique, an incision was made into the abscess cavity which was   then drained of purulent material.  Culture obtained. Procedure well   tolerated.  Dressing applied and wound care instructions provided.   May remove packing in 48 hours, and continue regular dressings  until abscess resolves. Cephalexin prescribed, common side effects discussed.    Follow-up with general surgeon in 6 weeks for complete excision if needed.  Return to clinic prn for pain, increased swelling or fever.      Demetrius Escobar MD  [facility]

## 2021-01-22 NOTE — PATIENT INSTRUCTIONS
Patient Education     Infected Epidermoid Cyst (Antibiotic Treatment)   You have an epidermoid cyst. This is a small, painless lump under your skin. An epidermoid cyst is often called an epidermal cyst, an epidermal inclusion cyst, or incorrectly, a sebaceous cyst. Epidermoid cysts form slowly under the skin. They can be found on most parts of the body. But they are most often found on areas with more hair such as the scalp, face, upper back, and genitals.   Here are some general facts about these cysts:     A cyst is a sac filled with material that is often cheesy, fatty, oily, or stringy. The material inside can be thick. Or it can be a liquid.    The area around the cyst may smell bad. If the cyst breaks open, the material inside it often smells bad as well.    You can usually move the cyst slightly if you try.    The cyst can be smaller than a pea or as large as a few inches.    The cyst is usually not painful, unless it becomes inflamed or infected.  Your cyst became infected and your healthcare provider wants to treat it with antibiotics. You will likely take the antibiotics by mouth or apply it as a cream, or both. If the antibiotics don t clear up the infection, the cyst will need to be drained by making a small cut (incision). Local anesthesia will be used to numb the area before the incision and drainage.   Home care    Resist the temptation to squeeze or pop the cyst, stick a needle in it, or cut it open. This often leads to a worsening infection and scarring.    If antibiotic pills were prescribed, take them exactly as directed. Finish the antibiotic prescribed, even though you may feel better after the first few days.    Soak the affected area in hot water or apply a hot pack (a thin, clean towel soaked in hot water) for 20 minutes at a time. Do this 3 to 4 times a day.    If your healthcare provider recommended it, apply antibiotic cream or ointment 2 to 3 times a day.    You may use over-the-counter  pain medicine to control pain, unless another medicine was given. If you have chronic liver or kidney disease or ever had a stomach ulcer or gastrointestinal bleeding, talk with your healthcare provider before using these medicines.    Prevention  Once this infection has healed, reduce the risk of future infections by:     Keeping the cyst area clean by bathing or showering daily    Avoiding tight-fitting clothing in the cyst area  Follow-up care  Follow up with your healthcare provider, or as advised. If a gauze packing was put in your wound, it should be removed in a few days as advised by your healthcare provider. Check your wound every day for the signs listed below.   When to seek medical advice  Call your healthcare provider right away if any of these occur:     Pus coming from the cyst    Increasing redness around the wound    Increasing local pain or swelling    Fever of 100.4 F (38 C) or higher, or as directed by your provider  [employer]ola last reviewed this educational content on 7/1/2019 2000-2020 [employer]. [address] [place] [address]. All rights reserved. This information is not intended as a substitute for professional medical care. Always follow your healthcare professional's instructions.

## 2021-01-26 LAB
BACTERIA SPEC CULT: ABNORMAL
BACTERIA SPEC CULT: ABNORMAL
Lab: ABNORMAL
SPECIMEN SOURCE: ABNORMAL

## 2021-01-28 ENCOUNTER — OFFICE VISIT (OUTPATIENT)
Dept: SURGERY | Facility: CLINIC | Age: 71
End: 2021-01-28
Payer: COMMERCIAL

## 2021-01-28 VITALS
SYSTOLIC BLOOD PRESSURE: 137 MMHG | TEMPERATURE: 97.5 F | HEART RATE: 58 BPM | RESPIRATION RATE: 20 BRPM | DIASTOLIC BLOOD PRESSURE: 64 MMHG

## 2021-01-28 DIAGNOSIS — L72.3 SEBACEOUS CYST: Primary | ICD-10-CM

## 2021-01-28 PROCEDURE — 99203 OFFICE O/P NEW LOW 30 MIN: CPT | Performed by: SURGERY

## 2021-01-28 NOTE — PROGRESS NOTES
Chief Complaint   Patient presents with     Consult     Cyst       Vitals:    01/28/21 1348   BP: 137/64   BP Location: Right arm   Patient Position: Sitting   Cuff Size: Adult Regular   Pulse: 58   Resp: 20   Temp: 97.5  F (36.4  C)   TempSrc: Tympanic     Wt Readings from Last 1 Encounters:   01/22/21 82.6 kg (182 lb)       Abraham ESPINOSA RN   [facility]

## 2021-01-28 NOTE — PROGRESS NOTES
General Surgery Note    Patient presents 5 days after incision and drainage of mid back sebaceous cyst. He is still on antibiotics, packing has been removed. No active drainage from the area.  He feels significantly improved.    Physical exam: Vitals: /64 (BP Location: Right arm, Patient Position: Sitting, Cuff Size: Adult Regular)   Pulse 58   Temp 97.5  F (36.4  C) (Tympanic)   Resp 20     Exam:  Constitutional: healthy, alert and no distress  Cardiovascular: negative  Respiratory: negative  Right upper back: 1.0 cm sebaceous cyst.  Right mid back 2.5 cm area of induration, previous drainage site without active drainage and minimal erythema      Assessment:     ICD-10-CM    1. Sebaceous cyst  L72.3         Plan: Mr. Baig has 2 sebaceous cysts, one recently incised and drained.  I would recommend letting inflammation diminish prior to proceeding with office based excision.  Patient to return in 4 weeks.  Advised of risks to watch for.  Advised to complete course of antibiotics     Charlie Phoenix,  on 1/28/2021 at 2:07 PM

## 2021-01-28 NOTE — LETTER
1/28/2021         RE: Bruno Baig  [address] [facility] [address]        Dear Colleague,    Thank you for referring your patient, Bruno Baig, to the [facility]. Please see a copy of my visit note below.    Chief Complaint   Patient presents with     Consult     Cyst       Vitals:    01/28/21 1348   BP: 137/64   BP Location: Right arm   Patient Position: Sitting   Cuff Size: Adult Regular   Pulse: 58   Resp: 20   Temp: 97.5  F (36.4  C)   TempSrc: Tympanic     Wt Readings from Last 1 Encounters:   01/22/21 82.6 kg (182 lb)       Abraham ESPINOSA RN   Specialty Clinics       General Surgery Note    Patient presents 5 days after incision and drainage of mid back sebaceous cyst. He is still on antibiotics, packing has been removed. No active drainage from the area.  He feels significantly improved.    Physical exam: Vitals: /64 (BP Location: Right arm, Patient Position: Sitting, Cuff Size: Adult Regular)   Pulse 58   Temp 97.5  F (36.4  C) (Tympanic)   Resp 20     Exam:  Constitutional: healthy, alert and no distress  Cardiovascular: negative  Respiratory: negative  Right upper back: 1.0 cm sebaceous cyst.  Right mid back 2.5 cm area of induration, previous drainage site without active drainage and minimal erythema      Assessment:     ICD-10-CM    1. Sebaceous cyst  L72.3         Plan: Mr. Baig has 2 sebaceous cysts, one recently incised and drained.  I would recommend letting inflammation diminish prior to proceeding with office based excision.  Patient to return in 4 weeks.  Advised of risks to watch for.  Advised to complete course of antibiotics     Charlie Phoenix DO on 1/28/2021 at 2:07 PM          Again, thank you for allowing me to participate in the care of your patient.        Sincerely,        Charlie Phoenix DO

## 2021-02-08 DIAGNOSIS — E78.5 HYPERLIPIDEMIA LDL GOAL <100: ICD-10-CM

## 2021-02-09 RX ORDER — ATORVASTATIN CALCIUM 40 MG/1
40 TABLET, FILM COATED ORAL DAILY
Qty: 45 TABLET | Refills: 0 | Status: SHIPPED | OUTPATIENT
Start: 2021-02-09 | End: 2021-03-18

## 2021-02-25 ENCOUNTER — OFFICE VISIT (OUTPATIENT)
Dept: SURGERY | Facility: CLINIC | Age: 71
End: 2021-02-25
Payer: COMMERCIAL

## 2021-02-25 VITALS — HEART RATE: 64 BPM | DIASTOLIC BLOOD PRESSURE: 58 MMHG | SYSTOLIC BLOOD PRESSURE: 137 MMHG | TEMPERATURE: 96.2 F

## 2021-02-25 DIAGNOSIS — L72.3 SEBACEOUS CYST: Primary | ICD-10-CM

## 2021-02-25 PROCEDURE — 11401 EXC TR-EXT B9+MARG 0.6-1 CM: CPT | Performed by: SURGERY

## 2021-02-25 PROCEDURE — 88304 TISSUE EXAM BY PATHOLOGIST: CPT | Performed by: PATHOLOGY

## 2021-02-25 NOTE — PATIENT INSTRUCTIONS
Procedural Follow-up care   Most wounds heal within 10 days. But an infection may sometimes occur despite proper treatment. Be sure to check the wound daily for the signs of infection listed below. Stitches should be taken out within 7 to14 days (please schedule at the  before you leave.)  You may have surgical tape closures. If these have not fallen off after 7 days, you can remove them yourself unless you were told otherwise.  When to seek medical advice  Call your healthcare provider right away if any of these occur:    Pain in the wound gets worse    Redness, swelling, or pus coming from the wound    Fever of 100.4 F (38 C) or higher, or as directed by your provider    Stitches come apart or fall out, or surgical tape falls off before 5 days    The wound edges reopen    Numb feeling that doesn t go away by the time stitches are removed      Please call our office at [phone] with any questions or concerns.

## 2021-02-25 NOTE — NURSING NOTE
"Chief Complaint   Patient presents with     RECHECK     Cysts        Initial /58 (BP Location: Right arm, Patient Position: Chair, Cuff Size: Adult Regular)   Pulse 64   Temp 96.2  F (35.7  C) (Tympanic)  Estimated body mass index is 28.51 kg/m  as calculated from the following:    Height as of 1/22/21: 1.702 m (5' 7\").    Weight as of 1/22/21: 82.6 kg (182 lb).  BP completed using cuff size: regular   Medications and allergies reviewed.      Caro FRANCISCO CMA     "

## 2021-02-25 NOTE — PROGRESS NOTES
Procedure Date: 02/25/21     PREOPERATIVE DIAGNOSIS: Upper back mass x 2  POSTOPERATIVE DIAGNOSIS: Same     PROCEDURE: 1. Excision of 2 upper back masses     ATTENDING SURGEON: Charlie Phoenix DO    ESTIMATED BLOOD LOSS: 3mL    ANESTHESIA: Local Anesthesia     INDICATIONS FOR PROCEDURE: : Bruno Baig presents with a history of an enlarging, tender upper back mass s/p incision and drainage of medial mass.  Patient had additional enlarging lateral mass without evidence of infection. After discussion was held with the patient regarding indications, risks, benefits and alternatives, benefits, and risks, his questions were addressed and he understood and wished to proceed. Specific risks discussed included bleeding, infection, seroma, need for additional treatment, nontherapeutic intervention, wound complication (such as dehiscence), recurrence, and rare complications related to surgery and/or anesthesia such as venous thromboembolism and cardiorespiratory complications.     PROCEDURE: After informed consent was obtained, the patient was brought to the procedure room and placed in the prone position on the Operating Room table. The surgical site was then prepped and draped in the typical sterile fashion. A time-out was performed to verify patient and procedure.      Local anesthetic was delivered, and an incision was carried out over the lateral mass first. The surrounding tissue was carefully dissected to free the mass, to a width of 1.0 cm, length of 1.0cm, and subcutaneous depth of 1.0 cm. Once excised this was passed off the field and sent routine to pathology. Hemostasis was assured. The wound was closed with interrupted 3-0 Nylon.      Local anesthetic was delivered, and an incision was carried out over the medial mass (previously infected). The surrounding tissue was carefully dissected to free the mass, to a width of 1.0 cm, length of 1.0cm, and subcutaneous depth of 1.0 cm.  There was a small amount of  serous fluid which was encountered.  The cyst wall was not intact. The walls of the cavity were scraped to clean, healthy tissue. Once excised this was passed off the field and sent routine to pathology. Hemostasis was assured. The wound was closed with interrupted 3-0 Nylon.     The patient tolerated the procedure well.    RTC 10 days for suture removal.     Charlie Phoenix, DO on 2/25/2021 at 2:13 PM

## 2021-02-25 NOTE — LETTER
2/25/2021         RE: Bruno Baig  [address] [facility] [address]        Dear Colleague,    Thank you for referring your patient, Bruno Baig, to the [facility]. Please see a copy of my visit note below.    Procedure Date: 02/25/21     PREOPERATIVE DIAGNOSIS: Upper back mass x 2  POSTOPERATIVE DIAGNOSIS: Same     PROCEDURE: 1. Excision of 2 upper back masses     ATTENDING SURGEON: Charlie Phoenix DO    ESTIMATED BLOOD LOSS: 3mL    ANESTHESIA: Local Anesthesia     INDICATIONS FOR PROCEDURE: : Bruno Baig presents with a history of an enlarging, tender upper back mass s/p incision and drainage of medial mass.  Patient had additional enlarging lateral mass without evidence of infection. After discussion was held with the patient regarding indications, risks, benefits and alternatives, benefits, and risks, his questions were addressed and he understood and wished to proceed. Specific risks discussed included bleeding, infection, seroma, need for additional treatment, nontherapeutic intervention, wound complication (such as dehiscence), recurrence, and rare complications related to surgery and/or anesthesia such as venous thromboembolism and cardiorespiratory complications.     PROCEDURE: After informed consent was obtained, the patient was brought to the procedure room and placed in the prone position on the Operating Room table. The surgical site was then prepped and draped in the typical sterile fashion. A time-out was performed to verify patient and procedure.      Local anesthetic was delivered, and an incision was carried out over the lateral mass first. The surrounding tissue was carefully dissected to free the mass, to a width of 1.0 cm, length of 1.0cm, and subcutaneous depth of 1.0 cm. Once excised this was passed off the field and sent routine to pathology. Hemostasis was assured. The wound was closed with interrupted 3-0 Nylon.      Local anesthetic was delivered, and  an incision was carried out over the medial mass (previously infected). The surrounding tissue was carefully dissected to free the mass, to a width of 1.0 cm, length of 1.0cm, and subcutaneous depth of 1.0 cm.  There was a small amount of serous fluid which was encountered.  The cyst wall was not intact. The walls of the cavity were scraped to clean, healthy tissue. Once excised this was passed off the field and sent routine to pathology. Hemostasis was assured. The wound was closed with interrupted 3-0 Nylon.     The patient tolerated the procedure well.    RTC 10 days for suture removal.     Charlie Phoenix DO on 2/25/2021 at 2:13 PM        Again, thank you for allowing me to participate in the care of your patient.        Sincerely,        Charlie Phoenix DO

## 2021-03-01 LAB — COPATH REPORT: NORMAL

## 2021-03-08 ENCOUNTER — ALLIED HEALTH/NURSE VISIT (OUTPATIENT)
Dept: UROLOGY | Facility: CLINIC | Age: 71
End: 2021-03-08
Payer: COMMERCIAL

## 2021-03-08 DIAGNOSIS — D17.30 LIPOMA OF SKIN AND SUBCUTANEOUS TISSUE: Primary | ICD-10-CM

## 2021-03-08 PROCEDURE — 99207 PR NO CHARGE NURSE ONLY: CPT

## 2021-03-08 NOTE — NURSING NOTE
Bruno Baig presents to the clinic for removal of sutures and sutures,staples, steri strips. The patient has had sutures in place for 11 days. There has been no patient reported signs or symptoms of infection or drainage. 4  sutures seen and located on the upper back-two sites.  All sutures were easily removed today. Routine wound care discussed by the RN. The patient will follow up as needed.  Lynn COOK RN BSN PHN  Specialty Clinics

## 2021-03-10 ENCOUNTER — IMMUNIZATION (OUTPATIENT)
Dept: FAMILY MEDICINE | Facility: CLINIC | Age: 71
End: 2021-03-10
Payer: COMMERCIAL

## 2021-03-10 PROCEDURE — 91301 PR COVID VAC MODERNA 100 MCG/0.5 ML IM: CPT

## 2021-03-10 PROCEDURE — 0011A PR COVID VAC MODERNA 100 MCG/0.5 ML IM: CPT

## 2021-03-17 ENCOUNTER — DOCUMENTATION ONLY (OUTPATIENT)
Dept: LAB | Facility: CLINIC | Age: 71
End: 2021-03-17

## 2021-03-17 DIAGNOSIS — E78.5 HYPERLIPIDEMIA LDL GOAL <100: Primary | ICD-10-CM

## 2021-03-17 DIAGNOSIS — E78.1 HYPERTRIGLYCERIDEMIA: ICD-10-CM

## 2021-03-17 DIAGNOSIS — E78.5 HYPERLIPIDEMIA LDL GOAL <100: ICD-10-CM

## 2021-03-17 LAB
CHOLEST SERPL-MCNC: 204 MG/DL
HDLC SERPL-MCNC: 42 MG/DL
LDLC SERPL CALC-MCNC: ABNORMAL MG/DL
LDLC SERPL DIRECT ASSAY-MCNC: 88 MG/DL
NONHDLC SERPL-MCNC: 162 MG/DL
TRIGL SERPL-MCNC: 579 MG/DL

## 2021-03-17 PROCEDURE — 36415 COLL VENOUS BLD VENIPUNCTURE: CPT | Performed by: FAMILY MEDICINE

## 2021-03-17 PROCEDURE — 83721 ASSAY OF BLOOD LIPOPROTEIN: CPT | Mod: 59 | Performed by: FAMILY MEDICINE

## 2021-03-17 PROCEDURE — 80061 LIPID PANEL: CPT | Performed by: FAMILY MEDICINE

## 2021-03-17 NOTE — PROGRESS NOTES
Patient is coming for a fasting lipid panel after increasing medication.  Please sign pended order ASAP.  Thanks!

## 2021-03-18 DIAGNOSIS — E78.5 HYPERLIPIDEMIA LDL GOAL <100: ICD-10-CM

## 2021-03-18 RX ORDER — ATORVASTATIN CALCIUM 40 MG/1
TABLET, FILM COATED ORAL
Qty: 45 TABLET | Refills: 0 | Status: SHIPPED | OUTPATIENT
Start: 2021-03-18 | End: 2021-04-26

## 2021-03-23 ENCOUNTER — TELEPHONE (OUTPATIENT)
Dept: FAMILY MEDICINE | Facility: CLINIC | Age: 71
End: 2021-03-23

## 2021-03-23 DIAGNOSIS — E78.5 HYPERLIPIDEMIA LDL GOAL <100: Primary | ICD-10-CM

## 2021-03-23 RX ORDER — FENOFIBRATE 48 MG/1
48 TABLET, COATED ORAL DAILY
Qty: 90 TABLET | Refills: 3 | Status: SHIPPED | OUTPATIENT
Start: 2021-03-23 | End: 2021-06-14 | Stop reason: SINTOL

## 2021-04-07 ENCOUNTER — IMMUNIZATION (OUTPATIENT)
Dept: FAMILY MEDICINE | Facility: CLINIC | Age: 71
End: 2021-04-07
Attending: FAMILY MEDICINE
Payer: COMMERCIAL

## 2021-04-07 PROCEDURE — 0012A PR COVID VAC MODERNA 100 MCG/0.5 ML IM: CPT

## 2021-04-07 PROCEDURE — 91301 PR COVID VAC MODERNA 100 MCG/0.5 ML IM: CPT

## 2021-04-25 DIAGNOSIS — E78.5 HYPERLIPIDEMIA LDL GOAL <100: ICD-10-CM

## 2021-04-26 RX ORDER — ATORVASTATIN CALCIUM 40 MG/1
TABLET, FILM COATED ORAL
Qty: 45 TABLET | Refills: 0 | Status: SHIPPED | OUTPATIENT
Start: 2021-04-26 | End: 2021-06-08

## 2021-05-24 DIAGNOSIS — E78.1 HYPERTRIGLYCERIDEMIA: ICD-10-CM

## 2021-05-24 DIAGNOSIS — E78.5 HYPERLIPIDEMIA LDL GOAL <100: ICD-10-CM

## 2021-05-24 LAB
ALBUMIN SERPL-MCNC: 3.7 G/DL (ref 3.4–5)
ALP SERPL-CCNC: 33 U/L (ref 40–150)
ALT SERPL W P-5'-P-CCNC: 22 U/L (ref 0–70)
ANION GAP SERPL CALCULATED.3IONS-SCNC: 4 MMOL/L (ref 3–14)
AST SERPL W P-5'-P-CCNC: 21 U/L (ref 0–45)
BILIRUB SERPL-MCNC: 0.6 MG/DL (ref 0.2–1.3)
BUN SERPL-MCNC: 35 MG/DL (ref 7–30)
CALCIUM SERPL-MCNC: 9.8 MG/DL (ref 8.5–10.1)
CHLORIDE SERPL-SCNC: 107 MMOL/L (ref 94–109)
CHOLEST SERPL-MCNC: 195 MG/DL
CO2 SERPL-SCNC: 24 MMOL/L (ref 20–32)
CREAT SERPL-MCNC: 1.75 MG/DL (ref 0.66–1.25)
GFR SERPL CREATININE-BSD FRML MDRD: 38 ML/MIN/{1.73_M2}
GLUCOSE SERPL-MCNC: 108 MG/DL (ref 70–99)
HDLC SERPL-MCNC: 37 MG/DL
LDLC SERPL CALC-MCNC: 82 MG/DL
NONHDLC SERPL-MCNC: 158 MG/DL
POTASSIUM SERPL-SCNC: 4.4 MMOL/L (ref 3.4–5.3)
PROT SERPL-MCNC: 7.4 G/DL (ref 6.8–8.8)
SODIUM SERPL-SCNC: 135 MMOL/L (ref 133–144)
TRIGL SERPL-MCNC: 378 MG/DL

## 2021-05-24 PROCEDURE — 80061 LIPID PANEL: CPT | Performed by: FAMILY MEDICINE

## 2021-05-24 PROCEDURE — 36415 COLL VENOUS BLD VENIPUNCTURE: CPT | Performed by: FAMILY MEDICINE

## 2021-05-24 PROCEDURE — 80053 COMPREHEN METABOLIC PANEL: CPT | Performed by: FAMILY MEDICINE

## 2021-05-28 ENCOUNTER — OFFICE VISIT (OUTPATIENT)
Dept: FAMILY MEDICINE | Facility: CLINIC | Age: 71
End: 2021-05-28
Payer: COMMERCIAL

## 2021-05-28 VITALS
DIASTOLIC BLOOD PRESSURE: 68 MMHG | SYSTOLIC BLOOD PRESSURE: 134 MMHG | HEART RATE: 64 BPM | RESPIRATION RATE: 12 BRPM | BODY MASS INDEX: 28.35 KG/M2 | WEIGHT: 181 LBS

## 2021-05-28 DIAGNOSIS — N18.30 STAGE 3 CHRONIC KIDNEY DISEASE, UNSPECIFIED WHETHER STAGE 3A OR 3B CKD (H): Primary | ICD-10-CM

## 2021-05-28 DIAGNOSIS — N17.9 ACUTE KIDNEY INJURY (H): ICD-10-CM

## 2021-05-28 DIAGNOSIS — I71.40 ABDOMINAL AORTIC ANEURYSM (AAA) WITHOUT RUPTURE (H): ICD-10-CM

## 2021-05-28 DIAGNOSIS — I10 BENIGN ESSENTIAL HYPERTENSION: ICD-10-CM

## 2021-05-28 PROCEDURE — 99214 OFFICE O/P EST MOD 30 MIN: CPT | Performed by: FAMILY MEDICINE

## 2021-05-28 NOTE — PROGRESS NOTES
Assessment & Plan     Stage 3 chronic kidney disease, unspecified whether stage 3a or 3b CKD  May be med related   - Basic metabolic panel; Future    Abdominal aortic aneurysm (AAA) without rupture (H)  Stable no change in treatment plan.   Imaged in July 2020             Patient Instructions   Stop fenofibrate     Check labs in 2 weeks, make a lab only               Return in about 2 weeks (around 6/11/2021) for Lab Work.    Charisse Lopez MD  [facility]    Jackie Carr is a 70 year old who presents for the following health issues     HPI     Discuss recent lab results  Kidney function seemed to start to see the change in cr same time lopid started     Last Comprehensive Metabolic Panel:  Sodium   Date Value Ref Range Status   05/24/2021 135 133 - 144 mmol/L Final     Potassium   Date Value Ref Range Status   05/24/2021 4.4 3.4 - 5.3 mmol/L Final     Chloride   Date Value Ref Range Status   05/24/2021 107 94 - 109 mmol/L Final     Carbon Dioxide   Date Value Ref Range Status   05/24/2021 24 20 - 32 mmol/L Final     Anion Gap   Date Value Ref Range Status   05/24/2021 4 3 - 14 mmol/L Final     Glucose   Date Value Ref Range Status   05/24/2021 108 (H) 70 - 99 mg/dL Final     Urea Nitrogen   Date Value Ref Range Status   05/24/2021 35 (H) 7 - 30 mg/dL Final     Creatinine   Date Value Ref Range Status   05/24/2021 1.75 (H) 0.66 - 1.25 mg/dL Final     GFR Estimate   Date Value Ref Range Status   05/24/2021 38 (L) >60 mL/min/[1.73_m2] Final     Comment:     Non  GFR Calc  Starting 12/18/2018, serum creatinine based estimated GFR (eGFR) will be   calculated using the Chronic Kidney Disease Epidemiology Collaboration   (CKD-EPI) equation.       Calcium   Date Value Ref Range Status   05/24/2021 9.8 8.5 - 10.1 mg/dL Final     Bilirubin Total   Date Value Ref Range Status   05/24/2021 0.6 0.2 - 1.3 mg/dL Final     Alkaline Phosphatase   Date Value Ref Range  Status   05/24/2021 33 (L) 40 - 150 U/L Final     ALT   Date Value Ref Range Status   05/24/2021 22 0 - 70 U/L Final     AST   Date Value Ref Range Status   05/24/2021 21 0 - 45 U/L Final     Recent Labs   Lab Test 05/24/21  0937 03/17/21  0801   CHOL 195 204*   HDL 37* 42   LDL 82 Cannot estimate LDL when triglyceride exceeds 400 mg/dL  88   TRIG 378* 579*                   Review of Systems   Constitutional, HEENT, cardiovascular, pulmonary, gi and gu systems are negative, except as otherwise noted.      Objective    /68   Pulse 64   Resp 12   Wt 82.1 kg (181 lb)   BMI 28.35 kg/m    Body mass index is 28.35 kg/m .  Physical Exam   GENERAL APPEARANCE: healthy, alert and no distress  PSYCH: mentation appears normal and affect normal/bright

## 2021-05-28 NOTE — NURSING NOTE
"Chief Complaint   Patient presents with     Results     Kidney function     BP (!) 160/60   Pulse 64   Resp 12   Wt 82.1 kg (181 lb)   BMI 28.35 kg/m   Estimated body mass index is 28.35 kg/m  as calculated from the following:    Height as of 1/22/21: 1.702 m (5' 7\").    Weight as of this encounter: 82.1 kg (181 lb).  Patient presents to the clinic using No DME      Health Maintenance that is potentially due pending provider review:    Health Maintenance Due   Topic Date Due     ANNUAL REVIEW OF HM ORDERS  Never done     Pneumococcal Vaccine: 65+ Years (1 of 2 - PPSV23) Never done     DTAP/TDAP/TD IMMUNIZATION (1 - Tdap) Never done        n/a        "

## 2021-06-04 DIAGNOSIS — E78.5 HYPERLIPIDEMIA LDL GOAL <100: ICD-10-CM

## 2021-06-08 RX ORDER — ATORVASTATIN CALCIUM 40 MG/1
40 TABLET, FILM COATED ORAL DAILY
Qty: 90 TABLET | Refills: 3 | Status: SHIPPED | OUTPATIENT
Start: 2021-06-08 | End: 2022-04-08

## 2021-06-11 DIAGNOSIS — N18.30 STAGE 3 CHRONIC KIDNEY DISEASE, UNSPECIFIED WHETHER STAGE 3A OR 3B CKD (H): ICD-10-CM

## 2021-06-11 LAB
ANION GAP SERPL CALCULATED.3IONS-SCNC: 8 MMOL/L (ref 3–14)
BUN SERPL-MCNC: 43 MG/DL (ref 7–30)
CALCIUM SERPL-MCNC: 10.7 MG/DL (ref 8.5–10.1)
CHLORIDE SERPL-SCNC: 106 MMOL/L (ref 94–109)
CO2 SERPL-SCNC: 23 MMOL/L (ref 20–32)
CREAT SERPL-MCNC: 2.05 MG/DL (ref 0.66–1.25)
GFR SERPL CREATININE-BSD FRML MDRD: 31 ML/MIN/{1.73_M2}
GLUCOSE SERPL-MCNC: 92 MG/DL (ref 70–99)
POTASSIUM SERPL-SCNC: 4.6 MMOL/L (ref 3.4–5.3)
SODIUM SERPL-SCNC: 137 MMOL/L (ref 133–144)

## 2021-06-11 PROCEDURE — 36415 COLL VENOUS BLD VENIPUNCTURE: CPT | Performed by: FAMILY MEDICINE

## 2021-06-11 PROCEDURE — 80048 BASIC METABOLIC PNL TOTAL CA: CPT | Performed by: FAMILY MEDICINE

## 2021-06-13 DIAGNOSIS — I10 BENIGN ESSENTIAL HYPERTENSION: ICD-10-CM

## 2021-06-15 RX ORDER — LOSARTAN POTASSIUM AND HYDROCHLOROTHIAZIDE 12.5; 1 MG/1; MG/1
TABLET ORAL
Qty: 90 TABLET | Refills: 3 | Status: SHIPPED | OUTPATIENT
Start: 2021-06-15 | End: 2021-07-30 | Stop reason: SINTOL

## 2021-06-15 NOTE — TELEPHONE ENCOUNTER
"Requested Prescriptions   Pending Prescriptions Disp Refills     losartan-hydrochlorothiazide (HYZAAR) 100-12.5 MG tablet [Pharmacy Med Name: LOSARTAN-HCTZ 100-12.5 MG TAB] 90 tablet 3     Sig: TAKE 1 TABLET BY MOUTH EVERY DAY       Angiotensin-II Receptors Failed - 6/13/2021  9:34 AM        Failed - Normal serum creatinine on file in past 12 months     Recent Labs   Lab Test 06/11/21  1423   CR 2.05*       Ok to refill medication if creatinine is low          Passed - Last blood pressure under 140/90 in past 12 months     BP Readings from Last 3 Encounters:   05/28/21 134/68   02/25/21 137/58   01/28/21 137/64                 Passed - Recent (12 mo) or future (30 days) visit within the authorizing provider's specialty     Patient has had an office visit with the authorizing provider or a provider within the authorizing providers department within the previous 12 mos or has a future within next 30 days. See \"Patient Info\" tab in [employer], or \"Choose Columns\" in Meds & Orders section of the refill encounter.              Passed - Medication is active on med list        Passed - Patient is age 18 or older        Passed - Normal serum potassium on file in past 12 months     Recent Labs   Lab Test 06/11/21  1423   POTASSIUM 4.6                   Diuretics (Including Combos) Protocol Failed - 6/13/2021  9:34 AM        Failed - Normal serum creatinine on file in past 12 months     Recent Labs   Lab Test 06/11/21  1423   CR 2.05*              Passed - Blood pressure under 140/90 in past 12 months     BP Readings from Last 3 Encounters:   05/28/21 134/68   02/25/21 137/58   01/28/21 137/64                 Passed - Recent (12 mo) or future (30 days) visit within the authorizing provider's specialty     Patient has had an office visit with the authorizing provider or a provider within the authorizing providers department within the previous 12 mos or has a future within next 30 days. See \"Patient Info\" tab in inb[employer], or " "\"Choose Columns\" in Meds & Orders section of the refill encounter.              Passed - Medication is active on med list        Passed - Patient is age 18 or older        Passed - Normal serum potassium on file in past 12 months     Recent Labs   Lab Test 06/11/21  1423   POTASSIUM 4.6                    Passed - Normal serum sodium on file in past 12 months     Recent Labs   Lab Test 06/11/21  1423                      "

## 2021-06-16 ENCOUNTER — E-CONSULT (OUTPATIENT)
Dept: NEPHROLOGY | Facility: CLINIC | Age: 71
End: 2021-06-16
Payer: COMMERCIAL

## 2021-06-16 PROCEDURE — 99451 NTRPROF PH1/NTRNET/EHR 5/>: CPT | Performed by: INTERNAL MEDICINE

## 2021-06-16 RX ORDER — AMLODIPINE BESYLATE 5 MG/1
5 TABLET ORAL DAILY
Qty: 90 TABLET | Refills: 0 | Status: SHIPPED | OUTPATIENT
Start: 2021-06-16 | End: 2021-07-30

## 2021-06-16 NOTE — PROGRESS NOTES
ALL SMARTFIELDS MUST BE COMPLETED FOR PATIENT CARE AND BILLING    6/16/2021     E-Consult has been accepted.    Interprofessional consultation requested by:  Charisse Lopez MD      Clinical Question/Purpose: MY CLINICAL QUESTION IS: Cr elevation in the last one month.  Has CKD, DM and baseline Cr had been 1.2-1.3 for the last several years.     Patient assessment and information reviewed:   71 yo male with hx of hypertension, pre-diabetes, gout, AAA without rupture, hyperlipidemia, tobacco use. Per his PMH in the chart, bipolar disorder is on his list as well, however, I do not see any bipolar related medications on his medication list/no lithium exposure in the past from what I can see. In regards to creatinine trend:  -0.9 in 2017  - ~ 1.1-1.3 from 2017 to Jan 2021  - NOEL to ~1.5 on 2 occasions in 2019  - Last creatinine at 1.32 was Jan 2021  - Next check 5/24/21 was at 1.75; 2.05 on 6/11/21  On review of medications, indomethacin on his medications list as TID dosing. He was also on gemfibrozil from 2017-Sept 2020. Started on fenofibrate March 2021 and just recently discontinued.   Noted also to have hypercalcemia dating back to 2019. PTH appropriately low in August 2019. SPEP normal in 2018.     Recommendations:   1. NOEL on CKD: at risk for CKD in the setting of hx of hypertension/tobacco use. Now with NOEL on CKD - NOEL may be associated with fenofibrate use but also need to consider diminished renal perfusion in the setting of hypercalcemia while on losartan/HCTZ. Lastly, indomethacin is prescribed as TID dosing - unclear how much indomethacin he has taken but this would also impact renal perfusion as along with hypercalcemia would diminish ability for afferent vessel vasodilatation.   Recommendations:  - Avoid all NSAIDs  - Stop losartan/HCTZ given concern for diminished renal perfusion and the hydrochlorothiazide may be contributing to hypercalcemia  - Avoid fenofibrate/gemfibrozil as these have been  associated with rise in creatinine  - Recommend getting him a nephrology appt for follow-up of this NOEL on CKD  - Recommend renal ultrasound to rule out obstruction  - Repeat labs in a week to assure improved with the above changes. With these labs, please get UA, urine protein/creatinine ratio    2. Hypertension: stop losartan/HCTZ as noted above. In the meantime, could trial amlodipine for BP support (educate on potential risk for swelling with CCB medications and discontinue if problematic). Recommend taking at night to decrease risk of edema associated with this medication. Need to avoid beta blockers with low heart rate.     3. Hypercalcemia: needs full workup. As noted above, hydrochlorothiazide may be contributing. Recommend discontinuing hydrochlorothiazide. Also recommend any calcium or vitamin D supplement at this time (currently on cholecalciferol 2000 units daily). Recommend the following labs for workup: Renal panel, PTH, 25 OH vitamin D, SPEP, serum immunofixation, kappa/lambda light chain ratio.     4. History of Leukocytosis: would be worth repeating a CBC to assure normal at this time. If ongoing leukocytosis, recommend hematology referral.     5. Prediabetes:  No diabetic medications at this time. Unlikely that he has diabetic nephropathy but urine studies will be helpful.     Above can be used as an initial workup/mgmt for this patient but given he has had abnormal kidney function dating back to 2017, I also recommend nephrology consultation.     Delta Del Rosario, DO      The recommendations provided in this E-Consult are based on the clinical data available to me at this time, and are furnished without the benefit of a comprehensive in-person or virtual patient evaluation, Any new clinical issues or changes in patient status since the filing of this E-Consult will need to be taken into account when assessing these recommendations. Please contact me if you have further questions.    My total time  spent reviewing clinical information and formulating assessment was 20 minutes.    Report sent automatically to requesting provider once signed.     Charge codes - [id number] (5+ minutes) or [id number] (No charge code)    Delta Del Rosario MD

## 2021-06-21 ENCOUNTER — TELEPHONE (OUTPATIENT)
Dept: FAMILY MEDICINE | Facility: CLINIC | Age: 71
End: 2021-06-21

## 2021-06-21 DIAGNOSIS — E55.9 VITAMIN D DEFICIENCY: ICD-10-CM

## 2021-06-21 DIAGNOSIS — N18.9 ACUTE KIDNEY INJURY SUPERIMPOSED ON CKD (H): Primary | ICD-10-CM

## 2021-06-21 DIAGNOSIS — N17.9 ACUTE KIDNEY INJURY SUPERIMPOSED ON CKD (H): Primary | ICD-10-CM

## 2021-06-28 DIAGNOSIS — E55.9 VITAMIN D DEFICIENCY: ICD-10-CM

## 2021-06-28 DIAGNOSIS — N18.9 ACUTE KIDNEY INJURY SUPERIMPOSED ON CKD (H): ICD-10-CM

## 2021-06-28 DIAGNOSIS — N17.9 ACUTE KIDNEY INJURY SUPERIMPOSED ON CKD (H): ICD-10-CM

## 2021-06-28 LAB
ALBUMIN SERPL-MCNC: 3.4 G/DL (ref 3.4–5)
ALBUMIN UR-MCNC: ABNORMAL MG/DL
ALP SERPL-CCNC: 58 U/L (ref 40–150)
ALT SERPL W P-5'-P-CCNC: 27 U/L (ref 0–70)
ANION GAP SERPL CALCULATED.3IONS-SCNC: 8 MMOL/L (ref 3–14)
APPEARANCE UR: CLEAR
AST SERPL W P-5'-P-CCNC: 24 U/L (ref 0–45)
BACTERIA #/AREA URNS HPF: ABNORMAL /HPF
BASOPHILS # BLD AUTO: 0 10E9/L (ref 0–0.2)
BASOPHILS NFR BLD AUTO: 0.6 %
BILIRUB SERPL-MCNC: 0.6 MG/DL (ref 0.2–1.3)
BILIRUB UR QL STRIP: NEGATIVE
BUN SERPL-MCNC: 22 MG/DL (ref 7–30)
CALCIUM SERPL-MCNC: 9.3 MG/DL (ref 8.5–10.1)
CHLORIDE SERPL-SCNC: 109 MMOL/L (ref 94–109)
CO2 SERPL-SCNC: 21 MMOL/L (ref 20–32)
COLOR UR AUTO: YELLOW
CREAT SERPL-MCNC: 1.24 MG/DL (ref 0.66–1.25)
CREAT UR-MCNC: 238 MG/DL
DEPRECATED CALCIDIOL+CALCIFEROL SERPL-MC: 43 UG/L (ref 20–75)
DIFFERENTIAL METHOD BLD: ABNORMAL
EOSINOPHIL # BLD AUTO: 0.4 10E9/L (ref 0–0.7)
EOSINOPHIL NFR BLD AUTO: 5.5 %
ERYTHROCYTE [DISTWIDTH] IN BLOOD BY AUTOMATED COUNT: 12.1 % (ref 10–15)
GFR SERPL CREATININE-BSD FRML MDRD: 57 ML/MIN/{1.73_M2}
GLUCOSE SERPL-MCNC: 108 MG/DL (ref 70–99)
GLUCOSE UR STRIP-MCNC: NEGATIVE MG/DL
HCT VFR BLD AUTO: 34.4 % (ref 40–53)
HGB BLD-MCNC: 11.9 G/DL (ref 13.3–17.7)
HGB UR QL STRIP: NEGATIVE
KETONES UR STRIP-MCNC: NEGATIVE MG/DL
LEUKOCYTE ESTERASE UR QL STRIP: NEGATIVE
LYMPHOCYTES # BLD AUTO: 1.6 10E9/L (ref 0.8–5.3)
LYMPHOCYTES NFR BLD AUTO: 23 %
MCH RBC QN AUTO: 32.3 PG (ref 26.5–33)
MCHC RBC AUTO-ENTMCNC: 34.6 G/DL (ref 31.5–36.5)
MCV RBC AUTO: 94 FL (ref 78–100)
MONOCYTES # BLD AUTO: 0.7 10E9/L (ref 0–1.3)
MONOCYTES NFR BLD AUTO: 9.8 %
NEUTROPHILS # BLD AUTO: 4.2 10E9/L (ref 1.6–8.3)
NEUTROPHILS NFR BLD AUTO: 61.1 %
NITRATE UR QL: NEGATIVE
NON-SQ EPI CELLS #/AREA URNS LPF: ABNORMAL /LPF
PH UR STRIP: 6 PH (ref 5–7)
PLATELET # BLD AUTO: 260 10E9/L (ref 150–450)
POTASSIUM SERPL-SCNC: 4.2 MMOL/L (ref 3.4–5.3)
PROT SERPL-MCNC: 7.1 G/DL (ref 6.8–8.8)
PROT UR-MCNC: 0.28 G/L
PROT/CREAT 24H UR: 0.12 G/G CR (ref 0–0.2)
PTH-INTACT SERPL-MCNC: 47 PG/ML (ref 18–80)
RBC # BLD AUTO: 3.68 10E12/L (ref 4.4–5.9)
RBC #/AREA URNS AUTO: ABNORMAL /HPF
SODIUM SERPL-SCNC: 138 MMOL/L (ref 133–144)
SOURCE: ABNORMAL
SP GR UR STRIP: 1.02 (ref 1–1.03)
UROBILINOGEN UR STRIP-ACNC: 0.2 EU/DL (ref 0.2–1)
WBC # BLD AUTO: 6.9 10E9/L (ref 4–11)
WBC #/AREA URNS AUTO: ABNORMAL /HPF

## 2021-06-28 PROCEDURE — 84165 PROTEIN E-PHORESIS SERUM: CPT | Performed by: PATHOLOGY

## 2021-06-28 PROCEDURE — 85025 COMPLETE CBC W/AUTO DIFF WBC: CPT | Performed by: FAMILY MEDICINE

## 2021-06-28 PROCEDURE — 82306 VITAMIN D 25 HYDROXY: CPT | Performed by: FAMILY MEDICINE

## 2021-06-28 PROCEDURE — 82784 ASSAY IGA/IGD/IGG/IGM EACH: CPT | Performed by: FAMILY MEDICINE

## 2021-06-28 PROCEDURE — 86334 IMMUNOFIX E-PHORESIS SERUM: CPT | Performed by: PATHOLOGY

## 2021-06-28 PROCEDURE — 81001 URINALYSIS AUTO W/SCOPE: CPT | Performed by: FAMILY MEDICINE

## 2021-06-28 PROCEDURE — 36415 COLL VENOUS BLD VENIPUNCTURE: CPT | Performed by: FAMILY MEDICINE

## 2021-06-28 PROCEDURE — 83970 ASSAY OF PARATHORMONE: CPT | Performed by: FAMILY MEDICINE

## 2021-06-28 PROCEDURE — 99N1036 PR STATISTIC TOTAL PROTEIN: Performed by: FAMILY MEDICINE

## 2021-06-28 PROCEDURE — 83883 ASSAY NEPHELOMETRY NOT SPEC: CPT | Performed by: FAMILY MEDICINE

## 2021-06-28 PROCEDURE — 84156 ASSAY OF PROTEIN URINE: CPT | Performed by: FAMILY MEDICINE

## 2021-06-28 PROCEDURE — 80053 COMPREHEN METABOLIC PANEL: CPT | Performed by: FAMILY MEDICINE

## 2021-06-28 NOTE — TELEPHONE ENCOUNTER
RECORDS RECEIVED FROM: Internal   DATE RECEIVED: 08.11.2021   NOTES STATUS DETAILS   OFFICE NOTE from referring provider Internal 05.28.2021 Charisse Lopez MD   OFFICE NOTE from other specialist  Internal 01.12.2021 Charlie Obrien PA-C   *Only VASCULITIS or LUPUS gather office notes for the following N/A    *PULMONARY   N/A    *ENT N/A    *DERMATOLOGY N/A    *RHEUMATOLOGY N/A    DISCHARGE SUMMARY from hospital N/A    DISCHARGE REPORT from the ER N/A    MEDICATION LIST Internal    IMAGING  (NEED IMAGES AND REPORTS)     KIDNEY CT SCAN N/A    KIDNEY ULTRASOUND N/A    MR ABDOMEN N/A    NUCLEAR MEDICINE RENAL N/A    LABS     CBC Internal 06.28.2021   CMP Internal 06.28.2021   BMP Internal 06.11.2021   UA Internal 06.28.2021   URINE PROTEIN Internal 06.28.2021   RENAL PANEL N/A    BIOPSY     KIDNEY BIOPSY  N/A

## 2021-06-29 LAB
ALBUMIN SERPL ELPH-MCNC: 4.1 G/DL (ref 3.7–5.1)
ALPHA1 GLOB SERPL ELPH-MCNC: 0.3 G/DL (ref 0.2–0.4)
ALPHA2 GLOB SERPL ELPH-MCNC: 0.9 G/DL (ref 0.5–0.9)
B-GLOBULIN SERPL ELPH-MCNC: 0.8 G/DL (ref 0.6–1)
GAMMA GLOB SERPL ELPH-MCNC: 0.9 G/DL (ref 0.7–1.6)
IGA SERPL-MCNC: 286 MG/DL (ref 84–499)
IGG SERPL-MCNC: 787 MG/DL (ref 610–1616)
IGM SERPL-MCNC: 49 MG/DL (ref 35–242)
KAPPA LC UR-MCNC: 3.55 MG/DL (ref 0.33–1.94)
KAPPA LC/LAMBDA SER: 1.5 {RATIO} (ref 0.26–1.65)
LAMBDA LC SERPL-MCNC: 2.36 MG/DL (ref 0.57–2.63)
M PROTEIN SERPL ELPH-MCNC: 0 G/DL
PROT PATTERN SERPL ELPH-IMP: NORMAL
PROT PATTERN SERPL IFE-IMP: NORMAL

## 2021-07-02 ENCOUNTER — HOSPITAL ENCOUNTER (OUTPATIENT)
Dept: ULTRASOUND IMAGING | Facility: CLINIC | Age: 71
Discharge: HOME | End: 2021-07-02
Attending: FAMILY MEDICINE | Admitting: FAMILY MEDICINE
Payer: COMMERCIAL

## 2021-07-02 DIAGNOSIS — N18.30 STAGE 3 CHRONIC KIDNEY DISEASE, UNSPECIFIED WHETHER STAGE 3A OR 3B CKD (H): ICD-10-CM

## 2021-07-02 DIAGNOSIS — N17.9 ACUTE KIDNEY INJURY (H): ICD-10-CM

## 2021-07-02 PROCEDURE — 76770 US EXAM ABDO BACK WALL COMP: CPT

## 2021-07-23 ENCOUNTER — DOCUMENTATION ONLY (OUTPATIENT)
Dept: LAB | Facility: CLINIC | Age: 71
End: 2021-07-23

## 2021-07-23 DIAGNOSIS — R73.03 PRE-DIABETES: ICD-10-CM

## 2021-07-23 DIAGNOSIS — I10 BENIGN ESSENTIAL HYPERTENSION: Primary | ICD-10-CM

## 2021-07-27 ENCOUNTER — LAB (OUTPATIENT)
Dept: LAB | Facility: CLINIC | Age: 71
End: 2021-07-27
Payer: COMMERCIAL

## 2021-07-27 DIAGNOSIS — I10 BENIGN ESSENTIAL HYPERTENSION: ICD-10-CM

## 2021-07-27 LAB
ANION GAP SERPL CALCULATED.3IONS-SCNC: 5 MMOL/L (ref 3–14)
BUN SERPL-MCNC: 25 MG/DL (ref 7–30)
CALCIUM SERPL-MCNC: 9.7 MG/DL (ref 8.5–10.1)
CHLORIDE BLD-SCNC: 110 MMOL/L (ref 94–109)
CO2 SERPL-SCNC: 24 MMOL/L (ref 20–32)
CREAT SERPL-MCNC: 1.14 MG/DL (ref 0.66–1.25)
GFR SERPL CREATININE-BSD FRML MDRD: 64 ML/MIN/1.73M2
GLUCOSE BLD-MCNC: 141 MG/DL (ref 70–99)
POTASSIUM BLD-SCNC: 4.1 MMOL/L (ref 3.4–5.3)
SODIUM SERPL-SCNC: 139 MMOL/L (ref 133–144)

## 2021-07-27 PROCEDURE — 36415 COLL VENOUS BLD VENIPUNCTURE: CPT

## 2021-07-27 PROCEDURE — 80048 BASIC METABOLIC PNL TOTAL CA: CPT

## 2021-07-28 NOTE — PROGRESS NOTES
"    Assessment & Plan     Benign essential hypertension  Stable no change in treatment plan.   Stay off of hyzaar for now   - amLODIPine (NORVASC) 5 MG tablet; Take 1 tablet (5 mg) by mouth daily    Pre-diabetes  Work on diet     Stage 3 chronic kidney disease, unspecified whether stage 3a or 3b CKD  Cr Back to normal acute injury resolved                  Tobacco Cessation:   reports that he has been smoking cigarettes. He has a 15.00 pack-year smoking history. He has never used smokeless tobacco.  Tobacco Cessation Action Plan: Information offered: Patient not interested at this time    BMI:   Estimated body mass index is 28.19 kg/m  as calculated from the following:    Height as of 1/22/21: 1.702 m (5' 7\").    Weight as of this encounter: 81.6 kg (180 lb).       Patient Instructions   Norvasc (amlodiine) 5mg for BP    lipitor (atorvastatin) 40mg for cholesterol     Labs in 3 months       Return in about 3 months (around 10/30/2021) for Lab Work.    Charisse Lopez MD  [facility]    Jackie Carr is a 70 year old who presents for the following health issues     HPI         Hypertension Follow-up      Do you check your blood pressure regularly outside of the clinic? No     Are you following a low salt diet? No    Are your blood pressures ever more than 140 on the top number (systolic) OR more   than 90 on the bottom number (diastolic), for example 140/90? unsure    Chronic Kidney Disease Follow-up    Do you take any over the counter pain medicine?: No     Had acute kidney injury related likely to gemfibrozil     Discuss recent labs:  Results for orders placed or performed in visit on 07/27/21 (from the past 48 hour(s))   Basic metabolic panel   Result Value Ref Range    Sodium 139 133 - 144 mmol/L    Potassium 4.1 3.4 - 5.3 mmol/L    Chloride 110 (H) 94 - 109 mmol/L    Carbon Dioxide (CO2) 24 20 - 32 mmol/L    Anion Gap 5 3 - 14 mmol/L    Urea Nitrogen 25 7 - 30 mg/dL    " Creatinine 1.14 0.66 - 1.25 mg/dL    Calcium 9.7 8.5 - 10.1 mg/dL    Glucose 141 (H) 70 - 99 mg/dL    GFR Estimate 64 >60 mL/min/1.73m2         US RENAL COMPLETE 7/2/2021 10:07 AM     CLINICAL HISTORY: Stage 3 chronic kidney disease, unspecified whether  stage 3a or 3b CKD; Acute kidney injury (H)  TECHNIQUE: Routine Bilateral Renal and Bladder Ultrasound.     COMPARISON: 7/31/2020     FINDINGS:     RIGHT KIDNEY: 10.4 cm. Normal without hydronephrosis or masses.      LEFT KIDNEY: 10.1 cm. Normal without hydronephrosis or masses.      BLADDER: Normal.                                                                      IMPRESSION:  1.  Normal kidney ultrasound.    Pre diabetes   Working on diet but not really paying that much attention to it       Review of Systems   Constitutional, HEENT, cardiovascular, pulmonary, gi and gu systems are negative, except as otherwise noted.      Objective    There were no vitals taken for this visit.  There is no height or weight on file to calculate BMI.  Physical Exam   GENERAL APPEARANCE: healthy, alert and no distress  PSYCH: mentation appears normal and affect normal/bright

## 2021-07-30 ENCOUNTER — OFFICE VISIT (OUTPATIENT)
Dept: FAMILY MEDICINE | Facility: CLINIC | Age: 71
End: 2021-07-30
Payer: COMMERCIAL

## 2021-07-30 VITALS
BODY MASS INDEX: 28.19 KG/M2 | DIASTOLIC BLOOD PRESSURE: 70 MMHG | SYSTOLIC BLOOD PRESSURE: 128 MMHG | WEIGHT: 180 LBS | RESPIRATION RATE: 12 BRPM | HEART RATE: 60 BPM

## 2021-07-30 DIAGNOSIS — I10 BENIGN ESSENTIAL HYPERTENSION: Primary | ICD-10-CM

## 2021-07-30 DIAGNOSIS — N18.30 STAGE 3 CHRONIC KIDNEY DISEASE, UNSPECIFIED WHETHER STAGE 3A OR 3B CKD (H): ICD-10-CM

## 2021-07-30 DIAGNOSIS — R73.03 PRE-DIABETES: ICD-10-CM

## 2021-07-30 PROCEDURE — 99214 OFFICE O/P EST MOD 30 MIN: CPT | Performed by: FAMILY MEDICINE

## 2021-07-30 RX ORDER — AMLODIPINE BESYLATE 5 MG/1
5 TABLET ORAL DAILY
Qty: 90 TABLET | Refills: 0 | Status: SHIPPED | OUTPATIENT
Start: 2021-07-30 | End: 2021-11-22

## 2021-07-30 NOTE — NURSING NOTE
"Chief Complaint   Patient presents with     Results     BP (!) 142/70   Pulse 60   Resp 12   Wt 81.6 kg (180 lb)   BMI 28.19 kg/m   Estimated body mass index is 28.19 kg/m  as calculated from the following:    Height as of 1/22/21: 1.702 m (5' 7\").    Weight as of this encounter: 81.6 kg (180 lb).  Patient presents to the clinic using No DME      Health Maintenance that is potentially due pending provider review:    Health Maintenance Due   Topic Date Due     ANNUAL REVIEW OF HM ORDERS  Never done     Pneumococcal Vaccine: 65+ Years (1 of 2 - PPSV23) Never done     DTAP/TDAP/TD IMMUNIZATION (1 - Tdap) Never done     FALL RISK ASSESSMENT  07/14/2021     ZOSTER IMMUNIZATION (2 of 2) 06/25/2021     MEDICARE ANNUAL WELLNESS VISIT  07/14/2021     LUNG CANCER SCREENING ANNUAL  07/31/2021        n/a        "

## 2021-07-30 NOTE — PATIENT INSTRUCTIONS
Norvasc (amlodiine) 5mg for BP    lipitor (atorvastatin) 40mg for cholesterol     Labs in 3 months

## 2021-08-06 DIAGNOSIS — N18.30 STAGE 3 CHRONIC KIDNEY DISEASE, UNSPECIFIED WHETHER STAGE 3A OR 3B CKD (H): Primary | ICD-10-CM

## 2021-08-11 ENCOUNTER — PRE VISIT (OUTPATIENT)
Dept: NEPHROLOGY | Facility: CLINIC | Age: 71
End: 2021-08-11

## 2021-08-14 ENCOUNTER — HEALTH MAINTENANCE LETTER (OUTPATIENT)
Age: 71
End: 2021-08-14

## 2021-10-09 ENCOUNTER — HEALTH MAINTENANCE LETTER (OUTPATIENT)
Age: 71
End: 2021-10-09

## 2021-11-01 ENCOUNTER — LAB (OUTPATIENT)
Dept: LAB | Facility: CLINIC | Age: 71
End: 2021-11-01
Payer: COMMERCIAL

## 2021-11-01 DIAGNOSIS — R73.03 PRE-DIABETES: ICD-10-CM

## 2021-11-01 DIAGNOSIS — I10 BENIGN ESSENTIAL HYPERTENSION: ICD-10-CM

## 2021-11-01 LAB
ANION GAP SERPL CALCULATED.3IONS-SCNC: 6 MMOL/L (ref 3–14)
BUN SERPL-MCNC: 19 MG/DL (ref 7–30)
CALCIUM SERPL-MCNC: 9.6 MG/DL (ref 8.5–10.1)
CHLORIDE BLD-SCNC: 107 MMOL/L (ref 94–109)
CO2 SERPL-SCNC: 26 MMOL/L (ref 20–32)
CREAT SERPL-MCNC: 1.07 MG/DL (ref 0.66–1.25)
GFR SERPL CREATININE-BSD FRML MDRD: 69 ML/MIN/1.73M2
GLUCOSE BLD-MCNC: 121 MG/DL (ref 70–99)
HBA1C MFR BLD: 5.6 % (ref 0–5.6)
POTASSIUM BLD-SCNC: 4 MMOL/L (ref 3.4–5.3)
SODIUM SERPL-SCNC: 139 MMOL/L (ref 133–144)

## 2021-11-01 PROCEDURE — 36415 COLL VENOUS BLD VENIPUNCTURE: CPT

## 2021-11-01 PROCEDURE — 80048 BASIC METABOLIC PNL TOTAL CA: CPT

## 2021-11-01 PROCEDURE — 83036 HEMOGLOBIN GLYCOSYLATED A1C: CPT

## 2021-11-20 DIAGNOSIS — I10 BENIGN ESSENTIAL HYPERTENSION: ICD-10-CM

## 2021-11-22 RX ORDER — AMLODIPINE BESYLATE 5 MG/1
TABLET ORAL
Qty: 90 TABLET | Refills: 2 | Status: SHIPPED | OUTPATIENT
Start: 2021-11-22 | End: 2022-04-08

## 2021-12-02 ENCOUNTER — TELEPHONE (OUTPATIENT)
Dept: FAMILY MEDICINE | Facility: CLINIC | Age: 71
End: 2021-12-02
Payer: COMMERCIAL

## 2021-12-02 DIAGNOSIS — N52.9 ERECTILE DYSFUNCTION, UNSPECIFIED ERECTILE DYSFUNCTION TYPE: Primary | ICD-10-CM

## 2021-12-02 NOTE — TELEPHONE ENCOUNTER
Reason for Call:  Other prescription    Detailed comments: Pt requesting a RX for ED     Phone Number Patient can be reached at: Home number on file [phone] (home)    Best Time: Any Time      Can we leave a detailed message on this number? YES    Call taken on 12/2/2021 at 11:12 AM by Octavia López

## 2022-04-05 ASSESSMENT — ENCOUNTER SYMPTOMS
DIZZINESS: 0
HEMATURIA: 0
FEVER: 0
NERVOUS/ANXIOUS: 0
CONSTIPATION: 0
WEAKNESS: 0
EYE PAIN: 0
CHILLS: 0
NAUSEA: 0
PARESTHESIAS: 0
ABDOMINAL PAIN: 0
PALPITATIONS: 0
DYSURIA: 0
DIARRHEA: 0
ARTHRALGIAS: 0
MYALGIAS: 0
FREQUENCY: 0
HEARTBURN: 0
JOINT SWELLING: 0
COUGH: 0
SORE THROAT: 0
SHORTNESS OF BREATH: 0
HEMATOCHEZIA: 0
HEADACHES: 0

## 2022-04-05 ASSESSMENT — ACTIVITIES OF DAILY LIVING (ADL): CURRENT_FUNCTION: NO ASSISTANCE NEEDED

## 2022-04-08 ENCOUNTER — OFFICE VISIT (OUTPATIENT)
Dept: FAMILY MEDICINE | Facility: CLINIC | Age: 72
End: 2022-04-08
Payer: COMMERCIAL

## 2022-04-08 VITALS
TEMPERATURE: 96.7 F | HEART RATE: 60 BPM | BODY MASS INDEX: 28.19 KG/M2 | HEIGHT: 68 IN | SYSTOLIC BLOOD PRESSURE: 120 MMHG | DIASTOLIC BLOOD PRESSURE: 70 MMHG | RESPIRATION RATE: 12 BRPM | WEIGHT: 186 LBS

## 2022-04-08 DIAGNOSIS — N18.30 STAGE 3 CHRONIC KIDNEY DISEASE, UNSPECIFIED WHETHER STAGE 3A OR 3B CKD (H): ICD-10-CM

## 2022-04-08 DIAGNOSIS — Z12.2 ENCOUNTER FOR SCREENING FOR LUNG CANCER: ICD-10-CM

## 2022-04-08 DIAGNOSIS — E78.5 HYPERLIPIDEMIA LDL GOAL <100: ICD-10-CM

## 2022-04-08 DIAGNOSIS — I10 BENIGN ESSENTIAL HYPERTENSION: ICD-10-CM

## 2022-04-08 DIAGNOSIS — Z00.00 ENCOUNTER FOR MEDICARE ANNUAL WELLNESS EXAM: Primary | ICD-10-CM

## 2022-04-08 DIAGNOSIS — F17.219 CIGARETTE NICOTINE DEPENDENCE WITH NICOTINE-INDUCED DISORDER: ICD-10-CM

## 2022-04-08 DIAGNOSIS — I71.40 ABDOMINAL AORTIC ANEURYSM (AAA) WITHOUT RUPTURE (H): ICD-10-CM

## 2022-04-08 DIAGNOSIS — R73.03 PRE-DIABETES: ICD-10-CM

## 2022-04-08 DIAGNOSIS — Z23 NEED FOR VACCINATION: ICD-10-CM

## 2022-04-08 LAB
ALBUMIN SERPL-MCNC: 3.5 G/DL (ref 3.4–5)
ALP SERPL-CCNC: 80 U/L (ref 40–150)
ALT SERPL W P-5'-P-CCNC: 29 U/L (ref 0–70)
ANION GAP SERPL CALCULATED.3IONS-SCNC: 5 MMOL/L (ref 3–14)
AST SERPL W P-5'-P-CCNC: 32 U/L (ref 0–45)
BILIRUB SERPL-MCNC: 0.5 MG/DL (ref 0.2–1.3)
BUN SERPL-MCNC: 17 MG/DL (ref 7–30)
CALCIUM SERPL-MCNC: 9.7 MG/DL (ref 8.5–10.1)
CHLORIDE BLD-SCNC: 106 MMOL/L (ref 94–109)
CHOLEST SERPL-MCNC: 172 MG/DL
CO2 SERPL-SCNC: 27 MMOL/L (ref 20–32)
CREAT SERPL-MCNC: 1.12 MG/DL (ref 0.66–1.25)
ERYTHROCYTE [DISTWIDTH] IN BLOOD BY AUTOMATED COUNT: 11.6 % (ref 10–15)
FASTING STATUS PATIENT QL REPORTED: YES
GFR SERPL CREATININE-BSD FRML MDRD: 69 ML/MIN/1.73M2
GLUCOSE BLD-MCNC: 118 MG/DL (ref 70–99)
HBA1C MFR BLD: 5.7 % (ref 0–5.6)
HCT VFR BLD AUTO: 42.2 % (ref 40–53)
HDLC SERPL-MCNC: 38 MG/DL
HGB BLD-MCNC: 14 G/DL (ref 13.3–17.7)
LDLC SERPL CALC-MCNC: 76 MG/DL
LDLC SERPL CALC-MCNC: ABNORMAL MG/DL
MCH RBC QN AUTO: 31.8 PG (ref 26.5–33)
MCHC RBC AUTO-ENTMCNC: 33.2 G/DL (ref 31.5–36.5)
MCV RBC AUTO: 96 FL (ref 78–100)
NONHDLC SERPL-MCNC: 134 MG/DL
PLATELET # BLD AUTO: 295 10E3/UL (ref 150–450)
POTASSIUM BLD-SCNC: 4.6 MMOL/L (ref 3.4–5.3)
PROT SERPL-MCNC: 7.5 G/DL (ref 6.8–8.8)
RBC # BLD AUTO: 4.4 10E6/UL (ref 4.4–5.9)
SODIUM SERPL-SCNC: 138 MMOL/L (ref 133–144)
TRIGL SERPL-MCNC: 490 MG/DL
WBC # BLD AUTO: 7.2 10E3/UL (ref 4–11)

## 2022-04-08 PROCEDURE — 85027 COMPLETE CBC AUTOMATED: CPT | Performed by: FAMILY MEDICINE

## 2022-04-08 PROCEDURE — 90732 PPSV23 VACC 2 YRS+ SUBQ/IM: CPT | Performed by: FAMILY MEDICINE

## 2022-04-08 PROCEDURE — 83036 HEMOGLOBIN GLYCOSYLATED A1C: CPT | Performed by: FAMILY MEDICINE

## 2022-04-08 PROCEDURE — 80053 COMPREHEN METABOLIC PANEL: CPT | Performed by: FAMILY MEDICINE

## 2022-04-08 PROCEDURE — 83721 ASSAY OF BLOOD LIPOPROTEIN: CPT | Mod: 59 | Performed by: FAMILY MEDICINE

## 2022-04-08 PROCEDURE — 99214 OFFICE O/P EST MOD 30 MIN: CPT | Mod: 25 | Performed by: FAMILY MEDICINE

## 2022-04-08 PROCEDURE — 80061 LIPID PANEL: CPT | Performed by: FAMILY MEDICINE

## 2022-04-08 PROCEDURE — G0009 ADMIN PNEUMOCOCCAL VACCINE: HCPCS | Performed by: FAMILY MEDICINE

## 2022-04-08 PROCEDURE — 36415 COLL VENOUS BLD VENIPUNCTURE: CPT | Performed by: FAMILY MEDICINE

## 2022-04-08 PROCEDURE — 99397 PER PM REEVAL EST PAT 65+ YR: CPT | Mod: 25 | Performed by: FAMILY MEDICINE

## 2022-04-08 RX ORDER — ATORVASTATIN CALCIUM 40 MG/1
40 TABLET, FILM COATED ORAL DAILY
Qty: 90 TABLET | Refills: 3 | Status: SHIPPED | OUTPATIENT
Start: 2022-04-08 | End: 2023-05-22

## 2022-04-08 RX ORDER — AMLODIPINE BESYLATE 5 MG/1
5 TABLET ORAL DAILY
Qty: 90 TABLET | Refills: 3 | Status: SHIPPED | OUTPATIENT
Start: 2022-04-08 | End: 2023-05-22

## 2022-04-08 ASSESSMENT — ENCOUNTER SYMPTOMS
ARTHRALGIAS: 0
PALPITATIONS: 0
HEADACHES: 0
SORE THROAT: 0
DIZZINESS: 0
CHILLS: 0
COUGH: 0
HEARTBURN: 0
EYE PAIN: 0
NAUSEA: 0
SHORTNESS OF BREATH: 0
ABDOMINAL PAIN: 0
CONSTIPATION: 0
DIARRHEA: 0
HEMATURIA: 0
FEVER: 0
WEAKNESS: 0
NERVOUS/ANXIOUS: 0
HEMATOCHEZIA: 0
PARESTHESIAS: 0
DYSURIA: 0
FREQUENCY: 0
JOINT SWELLING: 0
MYALGIAS: 0

## 2022-04-08 ASSESSMENT — ACTIVITIES OF DAILY LIVING (ADL): CURRENT_FUNCTION: NO ASSISTANCE NEEDED

## 2022-04-08 ASSESSMENT — PAIN SCALES - GENERAL: PAINLEVEL: NO PAIN (0)

## 2022-04-08 NOTE — PATIENT INSTRUCTIONS
Patient Education   Personalized Prevention Plan  You are due for the preventive services outlined below.  Your care team is available to assist you in scheduling these services.  If you have already completed any of these items, please share that information with your care team to update in your medical record.  Health Maintenance Due   Topic Date Due     ANNUAL REVIEW OF HM ORDERS  Never done     Diptheria Tetanus Pertussis (DTAP/TDAP/TD) Vaccine (1 - Tdap) Never done     Pneumococcal Vaccine (1 of 1 - PPSV23) Never done     LUNG CANCER SCREENING  07/31/2021     Hemoglobin  06/28/2022       Exercise for a Healthier Heart  You may wonder how you can improve the health of your heart. If you re thinking about exercise, you re on the right track. You don t need to become an athlete. But you do need a certain amount of brisk exercise to help strengthen your heart. If you have been diagnosed with a heart condition, your healthcare provider may advise exercise to help stabilize your condition. To help make exercise a habit, choose safe, fun activities.      Exercise with a friend. When activity is fun, you're more likely to stick with it.   Before you start  Check with your healthcare provider before starting an exercise program. This is especially important if you have not been active for a while. It's also important if you have a long-term (chronic) health problem such as heart disease, diabetes, or obesity. Or if you are at high risk for having these problems.   Why exercise?  Exercising regularly offers many healthy rewards. It can help you do all of the following:     Improve your blood cholesterol level to help prevent further heart trouble    Lower your blood pressure to help prevent a stroke or heart attack    Control diabetes, or reduce your risk of getting this disease    Improve your heart and lung function    Reach and stay at a healthy weight    Make your muscles stronger so you can stay active    Prevent  falls and fractures by slowing the loss of bone mass (osteoporosis)    Manage stress better    Reduce your blood pressure    Improve your sense of self and your body image  Exercise tips      Ease into your routine. Set small goals. Then build on them. If you are not sure what your activity level should be, talk with your healthcare provider first before starting an exercise routine.    Exercise on most days. Aim for a total of 150 minutes (2 hours and 30 minutes) or more of moderate-intensity aerobic activity each week. Or 75 minutes (1 hour and 15 minutes) or more of vigorous-intensity aerobic activity each week. Or try for a combination of both. Moderate activity means that you breathe heavier and your heart rate increases but you can still talk. Think about doing 40 minutes of moderate exercise, 3 to 4 times a week. For best results, activity should last for about 40 minutes to lower blood pressure and cholesterol. It's OK to work up to the 40-minute period over time. Examples of moderate-intensity activity are walking 1 mile in 15 minutes. Or doing 30 to 45 minutes of yard work.    Step up your daily activity level.  Along with your exercise program, try being more active the whole day. Walk instead of drive. Or park further away so that you take more steps each day. Do more household tasks or yard work. You may not be able to meet the advised mount of physical activity. But doing some moderate- or vigorous-intensity aerobic activity can help reduce your risk for heart disease. Your healthcare provider can help you figure out what is best for you.    Choose 1 or more activities you enjoy.  Walking is one of the easiest things you can do. You can also try swimming, riding a bike, dancing, or taking an exercise class.    When to call your healthcare provider  Call your healthcare provider if you have any of these:     Chest pain or feel dizzy or lightheaded    Burning, tightness, pressure, or heaviness in your  chest, neck, shoulders, back, or arms    Abnormal shortness of breath    More joint or muscle pain    A very fast or irregular heartbeat (palpitations)  [employer] last reviewed this educational content on 7/1/2019 2000-2021 [employer]. All rights reserved. This information is not intended as a substitute for professional medical care. Always follow your healthcare professional's instructions.          Understanding USDA MyPlate  The USDA has guidelines to help you make healthy food choices. These are called MyPlate. MyPlate shows the food groups that make up healthy meals using the image of a place setting. Before you eat, think about the healthiest choices for what to put on your plate or in your cup or bowl. To learn more about building a healthy plate, visit www.choosemyplate.gov.    The food groups    Fruits. Any fruit or 100% fruit juice counts as part of the Fruit Group. Fruits may be fresh, canned, frozen, or dried, and may be whole, cut-up, or pureed. Make 1/2 of your plate fruits and vegetables.    Vegetables. Any vegetable or 100% vegetable juice counts as a member of the Vegetable Group. Vegetables may be fresh, frozen, canned, or dried. They can be served raw or cooked and may be whole, cut-up, or mashed. Make 1/2 of your plate fruits and vegetables.    Grains. All foods made from grains are part of the Grains Group. These include wheat, rice, oats, cornmeal, and barley. Grains are often used to make foods such as bread, pasta, oatmeal, cereal, tortillas, and grits. Grains should be no more than 1/4 of your plate. At least half of your grains should be whole grains.    Protein. This group includes meat, poultry, seafood, beans and peas, eggs, processed soy products (such as tofu), nuts (including nut butters), and seeds. Make protein choices no more than 1/4 of your plate. Meat and poultry choices should be lean or low fat.    Dairy. The Dairy Group includes all fluid milk products and  foods made from milk that contain calcium, such as yogurt and cheese. (Foods that have little calcium, such as cream, butter, and cream cheese, are not part of this group.) Most dairy choices should be low-fat or fat-free.    Oils. Oils aren't a food group, but they do contain essential nutrients. However it's important to watch your intake of oils. These are fats that are liquid at room temperature. They include canola, corn, olive, soybean, vegetable, and sunflower oil. Foods that are mainly oil include mayonnaise, certain salad dressings, and soft margarines. You likely already get your daily oil allowance from the foods you eat.  Things to limit  Eating healthy also means limiting these things in your diet:       Salt (sodium). Many processed foods have a lot of sodium. To keep sodium intake down, eat fresh vegetables, meats, poultry, and seafood when possible. Purchase low-sodium, reduced-sodium, or no-salt-added food products at the store. And don't add salt to your meals at home. Instead, season them with herbs and spices such as dill, oregano, cumin, and paprika. Or try adding flavor with lemon or lime zest and juice.    Saturated fat. Saturated fats are most often found in animal products such as beef, pork, and chicken. They are often solid at room temperature, such as butter. To reduce your saturated fat intake, choose leaner cuts of meat and poultry. And try healthier cooking methods such as grilling, broiling, roasting, or baking. For a simple lower-fat swap, use plain nonfat yogurt instead of mayonnaise when making potato salad or macaroni salad.    Added sugars. These are sugars added to foods. They are in foods such as ice cream, candy, soda, fruit drinks, sports drinks, energy drinks, cookies, pastries, jams, and syrups. Cut down on added sugars by sharing sweet treats with a family member or friend. You can also choose fruit for dessert, and drink water or other unsweetened beverages.     [employer]  last reviewed this educational content on 6/1/2020 2000-2021 [employer]. All rights reserved. This information is not intended as a substitute for professional medical care. Always follow your healthcare professional's instructions.          Signs of Hearing Loss      Hearing much better with one ear can be a sign of hearing loss.   Hearing loss is a problem shared by many people. In fact, it is one of the most common health problems, particularly as people age. Most people age 65 and older have some hearing loss. By age 80, almost everyone does. Hearing loss often occurs slowly over the years. So you may not realize your hearing has gotten worse.  Have your hearing checked  Call your healthcare provider if you:    Have to strain to hear normal conversation    Have to watch other people s faces very carefully to follow what they re saying    Need to ask people to repeat what they ve said    Often misunderstand what people are saying    Turn the volume of the television or radio up so high that others complain    Feel that people are mumbling when they re talking to you    Find that the effort to hear leaves you feeling tired and irritated    Notice, when using the phone, that you hear better with one ear than the other  [employer] last reviewed this educational content on 1/1/2020 2000-2021 [employer]. All rights reserved. This information is not intended as a substitute for professional medical care. Always follow your healthcare professional's instructions.

## 2022-04-08 NOTE — NURSING NOTE
"Chief Complaint   Patient presents with     Medicare Visit     Wellness     /70   Pulse 60   Temp (!) 96.7  F (35.9  C) (Tympanic)   Resp 12   Ht 1.727 m (5' 8\")   Wt 84.4 kg (186 lb)   BMI 28.28 kg/m   Estimated body mass index is 28.28 kg/m  as calculated from the following:    Height as of this encounter: 1.727 m (5' 8\").    Weight as of this encounter: 84.4 kg (186 lb).  Patient presents to the clinic using No DME      Health Maintenance that is potentially due pending provider review:    Health Maintenance Due   Topic Date Due     ANNUAL REVIEW OF HM ORDERS  Never done     DTAP/TDAP/TD IMMUNIZATION (1 - Tdap) Never done     Pneumococcal Vaccine: 65+ Years (1 of 1 - PPSV23) Never done     LUNG CANCER SCREENING  07/31/2021     HEMOGLOBIN  06/28/2022        Possibly completing today per provider review.        "

## 2022-04-08 NOTE — PROGRESS NOTES
"SUBJECTIVE:   Bruno Baig is a 71 year old male who presents for Preventive Visit.      Patient has been advised of split billing requirements and indicates understanding: Yes  Are you in the first 12 months of your Medicare coverage?  No    Healthy Habits:     In general, how would you rate your overall health?  Good    Frequency of exercise:  None    Do you usually eat at least 4 servings of fruit and vegetables a day, include whole grains    & fiber and avoid regularly eating high fat or \"junk\" foods?  No    Taking medications regularly:  Yes    Medication side effects:  None    Ability to successfully perform activities of daily living:  No assistance needed    Home Safety:  Lack of grab bars in the bathroom    Hearing Impairment:  Difficulty following a conversation in a noisy restaurant or crowded room    In the past 6 months, have you been bothered by leaking of urine?  No    In general, how would you rate your overall mental or emotional health?  Good      PHQ-2 Total Score: 0    Additional concerns today:  No    Do you feel safe in your environment? Yes    Have you ever done Advance Care Planning? (For example, a Health Directive, POLST, or a discussion with a medical provider or your loved ones about your wishes): No, advance care planning information given to patient to review.  Patient declined advance care planning discussion at this time.       Fall risk  Fallen 2 or more times in the past year?: No  Any fall with injury in the past year?: No    Cognitive Screening   1) Repeat 3 items (Leader, Season, Table)    2) Clock draw: NORMAL  3) 3 item recall: Recalls 3 objects  Results: 3 items recalled: COGNITIVE IMPAIRMENT LESS LIKELY    Mini-CogTM Sofie Jauregui. Licensed by the author for use in [facility]; reprinted with permission ([email or web address]). All rights reserved.      Do you have sleep apnea, excessive snoring or daytime drowsiness?: unsure if he snores    Reviewed and updated as " needed this visit by clinical staff   Tobacco  Allergies  Meds              Reviewed and updated as needed this visit by Provider                 Social History     Tobacco Use     Smoking status: Current Every Day Smoker     Packs/day: 0.50     Years: 30.00     Pack years: 15.00     Types: Cigarettes     Smokeless tobacco: Never Used     Tobacco comment: 1/2 pack daily    Substance Use Topics     Alcohol use: Yes     Comment: 5-6 daily     If you drink alcohol do you typically have >3 drinks per day or >7 drinks per week? Yes      Alcohol Use 4/5/2022   Prescreen: >3 drinks/day or >7 drinks/week? Yes   Prescreen: >3 drinks/day or >7 drinks/week? -   AUDIT SCORE  10     AUDIT - Alcohol Use Disorders Identification Test - Reproduced from the World Health Organization Audit 2001 (Second Edition) 4/5/2022   1.  How often do you have a drink containing alcohol? 4 or more times a week   2.  How many drinks containing alcohol do you have on a typical day when you are drinking? 5 or 6   3.  How often do you have five or more drinks on one occasion? Daily or almost daily   4.  How often during the last year have you found that you were not able to stop drinking once you had started? Never   5.  How often during the last year have you failed to do what was normally expected of you because of drinking? Never   6.  How often during the last year have you needed a first drink in the morning to get yourself going after a heavy drinking session? Never   7.  How often during the last year have you had a feeling of guilt or remorse after drinking? Never   8.  How often during the last year have you been unable to remember what happened the night before because of your drinking? Never   9.  Have you or someone else been injured because of your drinking? No   10. Has a relative, friend, doctor or other health care worker been concerned about your drinking or suggested you cut down? No   TOTAL SCORE 10           Hyperlipidemia  Follow-Up      Are you regularly taking any medication or supplement to lower your cholesterol?   Yes- statin    Are you having muscle aches or other side effects that you think could be caused by your cholesterol lowering medication?  No    Hypertension Follow-up      Do you check your blood pressure regularly outside of the clinic? No     Are you following a low salt diet? No    Are your blood pressures ever more than 140 on the top number (systolic) OR more   than 90 on the bottom number (diastolic), for example 140/90? unsure    Chronic Kidney Disease Follow-up      Do you take any over the counter pain medicine?: No    Pt due for AAA follow up     Have had the shared decsion making conversation with pt regarding CT for lung CA   He has had these previously   Continues to smoke now down to ppd       Current providers sharing in care for this patient include:   Patient Care Team:  Charisse Lopez MD as PCP - General (Family Practice)  Charisse Lopez MD as Assigned PCP  Charlie Phoenix DO as Assigned Surgical Provider    The following health maintenance items are reviewed in Epic and correct as of today:  Health Maintenance Due   Topic Date Due     ANNUAL REVIEW OF HM ORDERS  Never done     DTAP/TDAP/TD IMMUNIZATION (1 - Tdap) Never done     Pneumococcal Vaccine: 65+ Years (1 of 1 - PPSV23) Never done     LUNG CANCER SCREENING  07/31/2021     HEMOGLOBIN  06/28/2022     Labs reviewed in EPIC          Review of Systems   Constitutional: Negative for chills and fever.   HENT: Negative for congestion, ear pain, hearing loss and sore throat.    Eyes: Negative for pain and visual disturbance.   Respiratory: Negative for cough and shortness of breath.    Cardiovascular: Negative for chest pain, palpitations and peripheral edema.   Gastrointestinal: Negative for abdominal pain, constipation, diarrhea, heartburn, hematochezia and nausea.   Genitourinary: Positive for impotence. Negative for dysuria,  "frequency, genital sores, hematuria, penile discharge and urgency.   Musculoskeletal: Negative for arthralgias, joint swelling and myalgias.   Skin: Negative for rash.   Neurological: Negative for dizziness, weakness, headaches and paresthesias.   Psychiatric/Behavioral: Negative for mood changes. The patient is not nervous/anxious.          OBJECTIVE:   /70   Pulse 60   Temp (!) 96.7  F (35.9  C) (Tympanic)   Resp 12   Ht 1.727 m (5' 8\")   Wt 84.4 kg (186 lb)   BMI 28.28 kg/m   Estimated body mass index is 28.28 kg/m  as calculated from the following:    Height as of this encounter: 1.727 m (5' 8\").    Weight as of this encounter: 84.4 kg (186 lb).  Physical Exam  GENERAL: healthy, alert and no distress  EYES: Eyes grossly normal to inspection, PERRL and conjunctivae and sclerae normal  HENT: ear canals and TM's normal, nose and mouth without ulcers or lesions  NECK: no adenopathy, no asymmetry, masses, or scars and thyroid normal to palpation  RESP: lungs clear to auscultation - no rales, rhonchi or wheezes  CV: regular rate and rhythm, normal S1 S2, no S3 or S4, no murmur, click or rub, no peripheral edema and peripheral pulses strong  ABDOMEN: soft, nontender, no hepatosplenomegaly, no masses and bowel sounds normal  MS: no gross musculoskeletal defects noted, no edema  SKIN: no suspicious lesions or rashes  NEURO: Normal strength and tone, mentation intact and speech normal  PSYCH: mentation appears normal, affect normal/bright    Diagnostic Test Results:  Labs reviewed in Epic    ASSESSMENT / PLAN:   (Z00.00) Encounter for Medicare annual wellness exam  (primary encounter diagnosis)  Comment:   Plan:     (I10) Benign essential hypertension  Comment: Stable no change in treatment plan.   Plan: REVIEW OF HEALTH MAINTENANCE PROTOCOL ORDERS,         Comprehensive metabolic panel, Lipid panel         reflex to direct LDL Fasting, CBC with         platelets, amLODIPine (NORVASC) 5 MG tablet        " "    (E78.5) Hyperlipidemia LDL goal <100  Comment: Adjust meds as indicated by above labs.   Plan: atorvastatin (LIPITOR) 40 MG tablet            (I71.4) Abdominal aortic aneurysm (AAA) without rupture (H)  Comment: Followup based on the above results.   Plan: US Abdominal Aorta Imaging            (N18.30) Stage 3 chronic kidney disease, unspecified whether stage 3a or 3b CKD (H)  Comment:   Plan: Followup based on the above results.   Has had side affects on ace do need to consider arb     (Z12.2) Encounter for screening for lung cancer  Comment:   Plan: pt would like to get CT understands the risks and benefits     (F17.219) Cigarette nicotine dependence with nicotine-induced disorder  Comment: advised to quit   Plan: CT Chest Lung Cancer Scrn Low Dose wo            (R73.03) Pre-diabetes  Comment:   Plan: Hemoglobin A1c            (Z23) Need for vaccination  Comment:   Plan: Pneumococcal vaccine 23 valent PPSV23          (Pneumovax) [80231]              Patient has been advised of split billing requirements and indicates understanding: Yes    COUNSELING:  Reviewed preventive health counseling, as reflected in patient instructions    Estimated body mass index is 28.28 kg/m  as calculated from the following:    Height as of this encounter: 1.727 m (5' 8\").    Weight as of this encounter: 84.4 kg (186 lb).        He reports that he has been smoking cigarettes. He has a 15.00 pack-year smoking history. He has never used smokeless tobacco.  Tobacco Cessation Action Plan:   Information offered: Patient not interested at this time      Appropriate preventive services were discussed with this patient, including applicable screening as appropriate for cardiovascular disease, diabetes, osteopenia/osteoporosis, and glaucoma.  As appropriate for age/gender, discussed screening for colorectal cancer, prostate cancer, breast cancer, and cervical cancer. Checklist reviewing preventive services available has been given to the " patient.    Reviewed patients plan of care and provided an AVS. The Basic Care Plan (routine screening as documented in Health Maintenance) for Bruno meets the Care Plan requirement. This Care Plan has been established and reviewed with the Patient.    Counseling Resources:  ATP IV Guidelines  Pooled Cohorts Equation Calculator  Breast Cancer Risk Calculator  Breast Cancer: Medication to Reduce Risk  FRAX Risk Assessment  ICSI Preventive Guidelines  Dietary Guidelines for Americans, 2010  [employer] MyPlate  ASA Prophylaxis  Lung CA Screening    hCarisse Lopez MD  [facility]    Identified Health Risks:

## 2022-04-18 ENCOUNTER — TELEPHONE (OUTPATIENT)
Dept: FAMILY MEDICINE | Facility: CLINIC | Age: 72
End: 2022-04-18
Payer: COMMERCIAL

## 2022-04-18 DIAGNOSIS — M10.9 ACUTE GOUT, UNSPECIFIED CAUSE, UNSPECIFIED SITE: Primary | ICD-10-CM

## 2022-04-18 RX ORDER — PREDNISONE 20 MG/1
20 TABLET ORAL DAILY
Qty: 5 TABLET | Refills: 0 | Status: SHIPPED | OUTPATIENT
Start: 2022-04-18 | End: 2022-04-25

## 2022-04-18 NOTE — TELEPHONE ENCOUNTER
Reason for call:  Patient reporting a symptom    Symptom or request: Pt says he has gout in his left great toe. He says Dr Lopez has prescribed something in the past he would take when he had a flare but she didn't want him to take that anymore. I asked if he could send an E-vist but he says he isn't good with that and doesn't know how to do that.     Duration (how long have symptoms been present): This started last Thursday, April 14    Have you been treated for this before? Yes    Additional comments: [employer] [place]    Phone Number patient can be reached at:  Home number on file [phone] (home)    Best Time:  anytime    Can we leave a detailed message on this number:  YES    Call taken on 4/18/2022 at 9:59 AM by Lisa Wang

## 2022-04-22 ENCOUNTER — HOSPITAL ENCOUNTER (OUTPATIENT)
Dept: ULTRASOUND IMAGING | Facility: CLINIC | Age: 72
Discharge: HOME | End: 2022-04-22
Attending: FAMILY MEDICINE
Payer: COMMERCIAL

## 2022-04-22 ENCOUNTER — HOSPITAL ENCOUNTER (OUTPATIENT)
Dept: CT IMAGING | Facility: CLINIC | Age: 72
Discharge: HOME | End: 2022-04-22
Attending: FAMILY MEDICINE
Payer: COMMERCIAL

## 2022-04-22 ENCOUNTER — TELEPHONE (OUTPATIENT)
Dept: FAMILY MEDICINE | Facility: CLINIC | Age: 72
End: 2022-04-22
Payer: COMMERCIAL

## 2022-04-22 DIAGNOSIS — M10.9 ACUTE GOUT, UNSPECIFIED CAUSE, UNSPECIFIED SITE: Primary | ICD-10-CM

## 2022-04-22 DIAGNOSIS — F17.219 CIGARETTE NICOTINE DEPENDENCE WITH NICOTINE-INDUCED DISORDER: ICD-10-CM

## 2022-04-22 DIAGNOSIS — I71.40 ABDOMINAL AORTIC ANEURYSM (AAA) WITHOUT RUPTURE (H): ICD-10-CM

## 2022-04-22 PROCEDURE — 76775 US EXAM ABDO BACK WALL LIM: CPT

## 2022-04-22 PROCEDURE — 71271 CT THORAX LUNG CANCER SCR C-: CPT

## 2022-04-22 RX ORDER — INDOMETHACIN 25 MG/1
25 CAPSULE ORAL 2 TIMES DAILY WITH MEALS
Qty: 10 CAPSULE | Refills: 0 | Status: SHIPPED | OUTPATIENT
Start: 2022-04-22 | End: 2022-04-25

## 2022-04-22 NOTE — TELEPHONE ENCOUNTER
The reason for use of pred instead of indocin initially is to protect his kidney fct   Would use this for no more than 5 days and stay really well hydrated

## 2022-04-22 NOTE — TELEPHONE ENCOUNTER
Dr. Lopez:    Spoke with the patient, he is reporting that he is on day 5 of the Prednisone and did not find it to be helpful for his gout on the left great toe, says it took the edge off it, but the pain is back, says it is worse at night, now he rates it 5/10. Patient reports he is watching his diet, not eating red meat or drinking alcohol for the past 3 days. Patient says Indomethacin helped before but he was told not to take it with his current medications of Amlodipine and Atorvastatin but he would consider holding these medications for a short time if you prescribe the Indomethacin again. Pharmacy is pended, please advise.      ANDREW Guajardo

## 2022-04-22 NOTE — TELEPHONE ENCOUNTER
Reason for Call:  Left foot large toe - Gout     Detailed comments: Pt said the medication has not helped. What is the next step regarding his left Foot Large Toe- Gout  Pt would like to discuss his medications and Gout.    Phone Number Patient can be reached at: Home number on file [phone] (home)    Best Time: Any Time      Can we leave a detailed message on this number? YES    Call taken on 4/22/2022 at 9:04 AM by Octavia López

## 2022-04-25 ENCOUNTER — E-CONSULT (OUTPATIENT)
Dept: CARDIOLOGY | Facility: CLINIC | Age: 72
End: 2022-04-25
Payer: COMMERCIAL

## 2022-04-25 ENCOUNTER — TELEPHONE (OUTPATIENT)
Dept: FAMILY MEDICINE | Facility: CLINIC | Age: 72
End: 2022-04-25
Payer: COMMERCIAL

## 2022-04-25 DIAGNOSIS — I25.84 CORONARY ARTERY DISEASE DUE TO CALCIFIED CORONARY LESION: Primary | ICD-10-CM

## 2022-04-25 DIAGNOSIS — I25.10 CORONARY ARTERY DISEASE DUE TO CALCIFIED CORONARY LESION: Primary | ICD-10-CM

## 2022-04-25 PROCEDURE — 99451 NTRPROF PH1/NTRNET/EHR 5/>: CPT | Performed by: INTERNAL MEDICINE

## 2022-04-25 PROCEDURE — 99207 E-CONSULT TO CARDIOLOGY (ADULT OUTPT PROVIDER TO SPECIALIST WRITTEN QUESTION & RESPONSE): CPT | Performed by: FAMILY MEDICINE

## 2022-04-25 NOTE — PROGRESS NOTES
ALL SMARTFIELDS MUST BE COMPLETED FOR PATIENT CARE AND BILLING    4/25/2022      E-Consult has been accepted.    Interprofessional consultation requested by:  Charisse Lopez MD      Clinical Question/Purpose: MY CLINICAL QUESTION IS: incidental finding on CT of the chest when screening for Lung Ca of moderate to severe CAD what is next step for him?    Patient assessment and information reviewed:     Recommendations:       If he is asymptomatic then a lexiscan stress would be reasonable as a first step. If he has any cardiac symptoms I would recommend cardiology consultation. Thanks.     The recommendations provided in this E-Consult are based on a review of clinical data pertinent to the clinical question presented, without a review of the patient's complete medical record or, the benefit of a comprehensive in-person or virtual patient evaluation. This consultation should not replace the clinical judgement and evaluation of the provider ordering this E-Consult. Any new clinical issues, or changes in patient status since the filing of this E-Consult will need to be taken into account when assessing these recommendations. Please contact me if you have further questions.    My total time spent reviewing clinical information and formulating assessment was 10 minutes.    Report sent automatically to requesting provider once signed.     Gabriela Sen MD

## 2022-04-25 NOTE — TELEPHONE ENCOUNTER
CT scan done Friday, radiology reporting incidental finding of moderate to severe coronary artery calcium

## 2022-04-29 ENCOUNTER — TELEPHONE (OUTPATIENT)
Dept: FAMILY MEDICINE | Facility: CLINIC | Age: 72
End: 2022-04-29
Payer: COMMERCIAL

## 2022-04-29 DIAGNOSIS — I25.10 CORONARY ARTERY DISEASE INVOLVING NATIVE HEART WITHOUT ANGINA PECTORIS, UNSPECIFIED VESSEL OR LESION TYPE: Primary | ICD-10-CM

## 2022-05-06 ENCOUNTER — TELEPHONE (OUTPATIENT)
Dept: FAMILY MEDICINE | Facility: CLINIC | Age: 72
End: 2022-05-06
Payer: COMMERCIAL

## 2022-05-06 DIAGNOSIS — M10.9 ACUTE GOUT, UNSPECIFIED CAUSE, UNSPECIFIED SITE: ICD-10-CM

## 2022-05-06 NOTE — TELEPHONE ENCOUNTER
Reason for Call:  Gout     Detailed comments: Gout - Rt Foot- Painful Joint, swollen   Pt recently got over one in the Lft Foot & Treated for this.  Please Advise.     Phone Number Patient can be reached at: Home number on file [phone] (home)    Best Time: Any Time      Can we leave a detailed message on this number? YES    Call taken on 5/6/2022 at 10:11 AM by Octavia López

## 2022-05-06 NOTE — TELEPHONE ENCOUNTER
Pt called, the gout in his L foot improved but now his R foot is swollen and painful. Please advise.   Kellen Davis RN

## 2022-05-09 RX ORDER — INDOMETHACIN 25 MG/1
25 CAPSULE ORAL 2 TIMES DAILY WITH MEALS
Qty: 10 CAPSULE | Refills: 0 | Status: SHIPPED | OUTPATIENT
Start: 2022-05-09 | End: 2022-07-19

## 2022-05-09 NOTE — TELEPHONE ENCOUNTER
Charisse Lopez MD  Nb Care Team Pool 2 hours ago (7:14 AM)     [place]    Call pt can use a 5 day course of indomethicin can refill this for him

## 2022-06-06 ENCOUNTER — HOSPITAL ENCOUNTER (OUTPATIENT)
Dept: NUCLEAR MEDICINE | Facility: CLINIC | Age: 72
Setting detail: NUCLEAR MEDICINE
Discharge: HOME | End: 2022-06-06
Attending: FAMILY MEDICINE
Payer: COMMERCIAL

## 2022-06-06 ENCOUNTER — HOSPITAL ENCOUNTER (OUTPATIENT)
Dept: CARDIOLOGY | Facility: CLINIC | Age: 72
Discharge: HOME | End: 2022-06-06
Attending: FAMILY MEDICINE
Payer: COMMERCIAL

## 2022-06-06 DIAGNOSIS — I25.10 CORONARY ARTERY DISEASE INVOLVING NATIVE HEART WITHOUT ANGINA PECTORIS, UNSPECIFIED VESSEL OR LESION TYPE: ICD-10-CM

## 2022-06-06 LAB
CV STRESS MAX HR HE: 85
RATE PRESSURE PRODUCT: NORMAL
STRESS ECHO BASELINE DIASTOLIC HE: 78
STRESS ECHO BASELINE HR: 59 BPM
STRESS ECHO BASELINE SYSTOLIC BP: 172
STRESS ECHO CALCULATED PERCENT HR: 58 %
STRESS ECHO LAST STRESS DIASTOLIC BP: 60
STRESS ECHO LAST STRESS SYSTOLIC BP: 192
STRESS ECHO TARGET HR: 147

## 2022-06-06 PROCEDURE — A9502 TC99M TETROFOSMIN: HCPCS | Performed by: FAMILY MEDICINE

## 2022-06-06 PROCEDURE — 93016 CV STRESS TEST SUPVJ ONLY: CPT

## 2022-06-06 PROCEDURE — 78452 HT MUSCLE IMAGE SPECT MULT: CPT

## 2022-06-06 PROCEDURE — 93018 CV STRESS TEST I&R ONLY: CPT | Performed by: INTERNAL MEDICINE

## 2022-06-06 PROCEDURE — 343N000001 HC RX 343: Performed by: FAMILY MEDICINE

## 2022-06-06 PROCEDURE — 78452 HT MUSCLE IMAGE SPECT MULT: CPT | Mod: 26 | Performed by: INTERNAL MEDICINE

## 2022-06-06 PROCEDURE — 93016 CV STRESS TEST SUPVJ ONLY: CPT | Performed by: INTERNAL MEDICINE

## 2022-06-06 PROCEDURE — 93017 CV STRESS TEST TRACING ONLY: CPT

## 2022-06-06 PROCEDURE — 250N000011 HC RX IP 250 OP 636: Performed by: FAMILY MEDICINE

## 2022-06-06 RX ORDER — REGADENOSON 0.08 MG/ML
0.4 INJECTION, SOLUTION INTRAVENOUS ONCE
Status: COMPLETED | OUTPATIENT
Start: 2022-06-06 | End: 2022-06-06

## 2022-06-06 RX ADMIN — TETROFOSMIN 10.8 MCI.: 1.38 INJECTION, POWDER, LYOPHILIZED, FOR SOLUTION INTRAVENOUS at 08:45

## 2022-06-06 RX ADMIN — REGADENOSON 0.4 MG: 0.08 INJECTION, SOLUTION INTRAVENOUS at 10:19

## 2022-06-06 RX ADMIN — TETROFOSMIN 30.6 MCI.: 1.38 INJECTION, POWDER, LYOPHILIZED, FOR SOLUTION INTRAVENOUS at 10:40

## 2022-07-17 DIAGNOSIS — M10.9 ACUTE GOUT, UNSPECIFIED CAUSE, UNSPECIFIED SITE: ICD-10-CM

## 2022-07-19 RX ORDER — INDOMETHACIN 25 MG/1
CAPSULE ORAL
Qty: 10 CAPSULE | Refills: 0 | Status: SHIPPED | OUTPATIENT
Start: 2022-07-19 | End: 2022-11-29

## 2022-07-19 NOTE — TELEPHONE ENCOUNTER
Pt says he is out. He takes for when he has flares. He recently did have a flare but it subsided. He is asking if we could sent this in ASAP please.

## 2022-09-11 ENCOUNTER — HEALTH MAINTENANCE LETTER (OUTPATIENT)
Age: 72
End: 2022-09-11

## 2022-11-23 DIAGNOSIS — M10.9 ACUTE GOUT, UNSPECIFIED CAUSE, UNSPECIFIED SITE: ICD-10-CM

## 2022-11-28 NOTE — TELEPHONE ENCOUNTER
"Requested Prescriptions   Pending Prescriptions Disp Refills    indomethacin (INDOCIN) 25 MG capsule [Pharmacy Med Name: INDOMETHACIN 25 MG CAPSULE] 10 capsule 0     Sig: TAKE 1 CAPSULE (25 MG) BY MOUTH 2 TIMES DAILY (WITH MEALS)**NOT COVERED**       Gout Agents Protocol Failed - 11/23/2022 10:40 AM        Failed - Has Uric Acid on file in past 12 months and value is less than 6     Recent Labs   Lab Test 07/01/19  1517   URIC 5.3     If level is 6mg/dL or greater, ok to refill one time and refer to provider.           Passed - CBC on file in past 12 months     Recent Labs   Lab Test 04/08/22  0816   WBC 7.2   RBC 4.40   HGB 14.0   HCT 42.2                    Passed - ALT on file in past 12 months     Recent Labs   Lab Test 04/08/22  0816   ALT 29             Passed - Recent (12 mo) or future (30 days) visit within the authorizing provider's specialty     Patient has had an office visit with the authorizing provider or a provider within the authorizing providers department within the previous 12 mos or has a future within next 30 days. See \"Patient Info\" tab in inbasket, or \"Choose Columns\" in Meds & Orders section of the refill encounter.              Passed - Medication is active on med list        Passed - Patient is age 18 or older        Passed - Normal serum creatinine on file in the past 12 months     Recent Labs   Lab Test 04/08/22  0816   CR 1.12       Ok to refill medication if creatinine is low         NSAID Medications Failed - 11/23/2022 10:40 AM        Failed - Patient is age 6-64 years        Passed - Blood pressure under 140/90 in past 12 months     BP Readings from Last 3 Encounters:   04/08/22 120/70   07/30/21 128/70   05/28/21 134/68                 Passed - Normal ALT on file in past 12 months     Recent Labs   Lab Test 04/08/22  0816   ALT 29             Passed - Normal AST on file in past 12 months     Recent Labs   Lab Test 04/08/22  0816   AST 32             Passed - Recent (12 mo) or " "future (30 days) visit within the authorizing provider's specialty     Patient has had an office visit with the authorizing provider or a provider within the authorizing providers department within the previous 12 mos or has a future within next 30 days. See \"Patient Info\" tab in inbasket, or \"Choose Columns\" in Meds & Orders section of the refill encounter.              Passed - Normal CBC on file in past 12 months     Recent Labs   Lab Test 04/08/22  0816   WBC 7.2   RBC 4.40   HGB 14.0   HCT 42.2                    Passed - Medication is active on med list        Passed - Normal serum creatinine on file in past 12 months     Recent Labs   Lab Test 04/08/22  0816   CR 1.12       Ok to refill medication if creatinine is low               "

## 2022-11-29 RX ORDER — INDOMETHACIN 25 MG/1
CAPSULE ORAL
Qty: 10 CAPSULE | Refills: 0 | Status: SHIPPED | OUTPATIENT
Start: 2022-11-29 | End: 2023-07-12

## 2023-02-15 ENCOUNTER — ANCILLARY PROCEDURE (OUTPATIENT)
Dept: GENERAL RADIOLOGY | Facility: CLINIC | Age: 73
End: 2023-02-15
Attending: PHYSICIAN ASSISTANT
Payer: COMMERCIAL

## 2023-02-15 ENCOUNTER — OFFICE VISIT (OUTPATIENT)
Dept: FAMILY MEDICINE | Facility: CLINIC | Age: 73
End: 2023-02-15
Payer: COMMERCIAL

## 2023-02-15 VITALS
DIASTOLIC BLOOD PRESSURE: 62 MMHG | SYSTOLIC BLOOD PRESSURE: 136 MMHG | OXYGEN SATURATION: 99 % | BODY MASS INDEX: 27.78 KG/M2 | TEMPERATURE: 97 F | HEART RATE: 61 BPM | HEIGHT: 67 IN | WEIGHT: 177 LBS | RESPIRATION RATE: 18 BRPM

## 2023-02-15 DIAGNOSIS — G89.29 CHRONIC RIGHT-SIDED LOW BACK PAIN WITH RIGHT-SIDED SCIATICA: Primary | ICD-10-CM

## 2023-02-15 DIAGNOSIS — G89.29 CHRONIC RIGHT-SIDED LOW BACK PAIN WITH RIGHT-SIDED SCIATICA: ICD-10-CM

## 2023-02-15 DIAGNOSIS — M54.41 CHRONIC RIGHT-SIDED LOW BACK PAIN WITH RIGHT-SIDED SCIATICA: ICD-10-CM

## 2023-02-15 DIAGNOSIS — M54.41 CHRONIC RIGHT-SIDED LOW BACK PAIN WITH RIGHT-SIDED SCIATICA: Primary | ICD-10-CM

## 2023-02-15 PROCEDURE — 72100 X-RAY EXAM L-S SPINE 2/3 VWS: CPT | Mod: TC | Performed by: RADIOLOGY

## 2023-02-15 PROCEDURE — 73502 X-RAY EXAM HIP UNI 2-3 VIEWS: CPT | Mod: TC | Performed by: RADIOLOGY

## 2023-02-15 PROCEDURE — 99213 OFFICE O/P EST LOW 20 MIN: CPT | Performed by: PHYSICIAN ASSISTANT

## 2023-02-15 ASSESSMENT — PAIN SCALES - GENERAL: PAINLEVEL: MODERATE PAIN (4)

## 2023-02-15 NOTE — PROGRESS NOTES
"Assessment & Plan   Chronic right-sided low back pain with right-sided sciatica  72 year old presenting with worsening of R sided posterior hip pain, and a new pain along the lateral R lower leg. Worse with standing and walking, and relieved with sitting. Physical exam significant for 4+/5 right sided resisted straight leg raise. Otherwise remainder of physical exam was within normal limits. Unclear etiology today, but I suspect something like piriformis syndrome causing sciatica. XR of the lumbar spine and R hip were ordered to rule out other causes like lumbar radiculopathy, hip OA. Will follow-up after x-rays to determine cause.   - XR Lumbar Spine 2/3 Views; Future  - XR Pelvis w Hip Right G/E 2 Views; Future    BMI:   Estimated body mass index is 27.72 kg/m  as calculated from the following:    Height as of this encounter: 1.702 m (5' 7\").    Weight as of this encounter: 80.3 kg (177 lb).       Return in about 4 weeks (around 3/15/2023), or if symptoms worsen or fail to improve, for In-Clinic Visit.      Subjective   Bruno is a 72 year old accompanied by his spouse, presenting for the following health issues:  Musculoskeletal Problem      HPI   Concern - Leg and hip pain   Onset: 3 weeks   Description: Right leg and hip pain   Intensity: moderate  Progression of Symptoms:  same  Accompanying Signs & Symptoms: none  Previous history of similar problem: no   Precipitating factors:        Worsened by: walking and standing   Alleviating factors:        Improved by: sitting   Therapies tried and outcome: acetaminophen     Heavy aching pain, not stabbing/shooting pain  No pain with sitting, comes on with walking and standing  No physical activity, does not work  Some numbness in toes (started years before symptoms) not acute in onset  Back pain    Review of Systems   See HPI, otherwise negative      Objective    /62   Pulse 61   Temp 97  F (36.1  C) (Tympanic)   Resp 18   Ht 1.702 m (5' 7\")   Wt 80.3 kg " (177 lb)   SpO2 99%   BMI 27.72 kg/m    Physical Exam   Constitutional: healthy, alert, and no distress  Head: Normocephalic. Atraumatic  Eyes: No conjunctival injection, sclera anicteric  CV: regular rate and rhythm, normal S1 S2, no S3 or S4, no murmur, click or rub,  Respiratory: No resp distress.  Musculoskeletal: No tenderness to palpation of spine, hips, thighs, legs. Normal range of motion. 4+/5 resisted straight leg raise of R leg. 5/5 hip flexion. 5/5 knee flexion and extension. 5/5 ankle dorsiflexion and plantar flexion.   Neurologic: Gait normal. CN 2-12 grossly intact  Psychiatric: mentation appears normal and affect normal/bright    MARY KAY Carrillo Student  [facility]  February 15, 2023     Physician Attestation   I, Charlie Obrien PA-C, was present with the medical/AMINAH student who participated in the service and in the documentation of the note.  I have verified the history and personally performed the physical exam and medical decision making.  I agree with the assessment and plan of care as documented in the note.      Charlie Obrien PA-C

## 2023-04-04 ENCOUNTER — LAB (OUTPATIENT)
Dept: LAB | Facility: CLINIC | Age: 73
End: 2023-04-04
Payer: COMMERCIAL

## 2023-04-04 DIAGNOSIS — E78.5 HYPERLIPIDEMIA LDL GOAL <100: ICD-10-CM

## 2023-04-04 LAB
CHOLEST SERPL-MCNC: 188 MG/DL
HDLC SERPL-MCNC: 43 MG/DL
LDLC SERPL CALC-MCNC: 70 MG/DL
NONHDLC SERPL-MCNC: 145 MG/DL
TRIGL SERPL-MCNC: 376 MG/DL

## 2023-04-04 PROCEDURE — 80061 LIPID PANEL: CPT

## 2023-04-04 PROCEDURE — 36415 COLL VENOUS BLD VENIPUNCTURE: CPT

## 2023-04-20 ENCOUNTER — PATIENT OUTREACH (OUTPATIENT)
Dept: CARE COORDINATION | Facility: CLINIC | Age: 73
End: 2023-04-20
Payer: COMMERCIAL

## 2023-05-21 DIAGNOSIS — E78.5 HYPERLIPIDEMIA LDL GOAL <100: ICD-10-CM

## 2023-05-21 DIAGNOSIS — I10 BENIGN ESSENTIAL HYPERTENSION: ICD-10-CM

## 2023-05-22 RX ORDER — AMLODIPINE BESYLATE 5 MG/1
TABLET ORAL
Qty: 90 TABLET | Refills: 0 | Status: SHIPPED | OUTPATIENT
Start: 2023-05-22 | End: 2023-06-20

## 2023-05-22 RX ORDER — ATORVASTATIN CALCIUM 40 MG/1
TABLET, FILM COATED ORAL
Qty: 90 TABLET | Refills: 0 | Status: SHIPPED | OUTPATIENT
Start: 2023-05-22 | End: 2023-06-20

## 2023-05-22 NOTE — TELEPHONE ENCOUNTER
Advised due for wellness visit, lab. Scheduled 06-20-23 with Dr. Lopez.   Britta refills given.  HENRIQUE Salcedo RN

## 2023-06-03 ENCOUNTER — HEALTH MAINTENANCE LETTER (OUTPATIENT)
Age: 73
End: 2023-06-03

## 2023-06-20 ENCOUNTER — OFFICE VISIT (OUTPATIENT)
Dept: FAMILY MEDICINE | Facility: CLINIC | Age: 73
End: 2023-06-20
Payer: COMMERCIAL

## 2023-06-20 VITALS
HEART RATE: 67 BPM | BODY MASS INDEX: 26.64 KG/M2 | WEIGHT: 175.8 LBS | HEIGHT: 68 IN | SYSTOLIC BLOOD PRESSURE: 130 MMHG | OXYGEN SATURATION: 98 % | TEMPERATURE: 97.3 F | RESPIRATION RATE: 16 BRPM | DIASTOLIC BLOOD PRESSURE: 70 MMHG

## 2023-06-20 DIAGNOSIS — I10 BENIGN ESSENTIAL HYPERTENSION: ICD-10-CM

## 2023-06-20 DIAGNOSIS — Z23 ENCOUNTER FOR IMMUNIZATION: ICD-10-CM

## 2023-06-20 DIAGNOSIS — Z00.00 ENCOUNTER FOR MEDICARE ANNUAL WELLNESS EXAM: Primary | ICD-10-CM

## 2023-06-20 DIAGNOSIS — I71.43 INFRARENAL ABDOMINAL AORTIC ANEURYSM (AAA) WITHOUT RUPTURE (H): ICD-10-CM

## 2023-06-20 DIAGNOSIS — E78.5 HYPERLIPIDEMIA LDL GOAL <100: ICD-10-CM

## 2023-06-20 DIAGNOSIS — Z87.891 PERSONAL HISTORY OF NICOTINE DEPENDENCE: ICD-10-CM

## 2023-06-20 DIAGNOSIS — H53.9 UNSPECIFIED VISUAL DISTURBANCE: ICD-10-CM

## 2023-06-20 DIAGNOSIS — H34.9 RETINAL ARTERY OCCLUSION: ICD-10-CM

## 2023-06-20 DIAGNOSIS — N18.30 STAGE 3 CHRONIC KIDNEY DISEASE, UNSPECIFIED WHETHER STAGE 3A OR 3B CKD (H): ICD-10-CM

## 2023-06-20 LAB
ALBUMIN SERPL BCG-MCNC: 4.5 G/DL (ref 3.5–5.2)
ALP SERPL-CCNC: 83 U/L (ref 40–129)
ALT SERPL W P-5'-P-CCNC: 26 U/L (ref 0–70)
ANION GAP SERPL CALCULATED.3IONS-SCNC: 10 MMOL/L (ref 7–15)
AST SERPL W P-5'-P-CCNC: 31 U/L (ref 0–45)
BILIRUB SERPL-MCNC: 0.4 MG/DL
BUN SERPL-MCNC: 17.8 MG/DL (ref 8–23)
CALCIUM SERPL-MCNC: 10.4 MG/DL (ref 8.8–10.2)
CHLORIDE SERPL-SCNC: 102 MMOL/L (ref 98–107)
CHOLEST SERPL-MCNC: 186 MG/DL
CREAT SERPL-MCNC: 1.19 MG/DL (ref 0.67–1.17)
CREAT UR-MCNC: 211.7 MG/DL
DEPRECATED HCO3 PLAS-SCNC: 26 MMOL/L (ref 22–29)
ERYTHROCYTE [DISTWIDTH] IN BLOOD BY AUTOMATED COUNT: 11.4 % (ref 10–15)
GFR SERPL CREATININE-BSD FRML MDRD: 64 ML/MIN/1.73M2
GLUCOSE SERPL-MCNC: 119 MG/DL (ref 70–99)
HCT VFR BLD AUTO: 41.8 % (ref 40–53)
HDLC SERPL-MCNC: 42 MG/DL
HGB BLD-MCNC: 14.4 G/DL (ref 13.3–17.7)
LDLC SERPL CALC-MCNC: ABNORMAL MG/DL
LDLC SERPL DIRECT ASSAY-MCNC: 86 MG/DL
MCH RBC QN AUTO: 31.7 PG (ref 26.5–33)
MCHC RBC AUTO-ENTMCNC: 34.4 G/DL (ref 31.5–36.5)
MCV RBC AUTO: 92 FL (ref 78–100)
MICROALBUMIN UR-MCNC: 25.9 MG/L
MICROALBUMIN/CREAT UR: 12.23 MG/G CR (ref 0–17)
NONHDLC SERPL-MCNC: 144 MG/DL
PLATELET # BLD AUTO: 252 10E3/UL (ref 150–450)
POTASSIUM SERPL-SCNC: 4.6 MMOL/L (ref 3.4–5.3)
PROT SERPL-MCNC: 7.2 G/DL (ref 6.4–8.3)
RBC # BLD AUTO: 4.54 10E6/UL (ref 4.4–5.9)
SODIUM SERPL-SCNC: 138 MMOL/L (ref 136–145)
TRIGL SERPL-MCNC: 503 MG/DL
WBC # BLD AUTO: 6.2 10E3/UL (ref 4–11)

## 2023-06-20 PROCEDURE — 82570 ASSAY OF URINE CREATININE: CPT | Performed by: FAMILY MEDICINE

## 2023-06-20 PROCEDURE — 80053 COMPREHEN METABOLIC PANEL: CPT | Performed by: FAMILY MEDICINE

## 2023-06-20 PROCEDURE — 80061 LIPID PANEL: CPT | Performed by: FAMILY MEDICINE

## 2023-06-20 PROCEDURE — 85027 COMPLETE CBC AUTOMATED: CPT | Performed by: FAMILY MEDICINE

## 2023-06-20 PROCEDURE — 90677 PCV20 VACCINE IM: CPT | Performed by: FAMILY MEDICINE

## 2023-06-20 PROCEDURE — G0009 ADMIN PNEUMOCOCCAL VACCINE: HCPCS | Performed by: FAMILY MEDICINE

## 2023-06-20 PROCEDURE — 82043 UR ALBUMIN QUANTITATIVE: CPT | Performed by: FAMILY MEDICINE

## 2023-06-20 PROCEDURE — 83721 ASSAY OF BLOOD LIPOPROTEIN: CPT | Mod: 59 | Performed by: FAMILY MEDICINE

## 2023-06-20 PROCEDURE — 36415 COLL VENOUS BLD VENIPUNCTURE: CPT | Performed by: FAMILY MEDICINE

## 2023-06-20 PROCEDURE — 99214 OFFICE O/P EST MOD 30 MIN: CPT | Mod: 25 | Performed by: FAMILY MEDICINE

## 2023-06-20 PROCEDURE — G0439 PPPS, SUBSEQ VISIT: HCPCS | Performed by: FAMILY MEDICINE

## 2023-06-20 RX ORDER — ATORVASTATIN CALCIUM 40 MG/1
40 TABLET, FILM COATED ORAL DAILY
Qty: 90 TABLET | Refills: 3 | Status: SHIPPED | OUTPATIENT
Start: 2023-06-20 | End: 2023-06-28

## 2023-06-20 RX ORDER — AMLODIPINE BESYLATE 5 MG/1
5 TABLET ORAL DAILY
Qty: 90 TABLET | Refills: 3 | Status: SHIPPED | OUTPATIENT
Start: 2023-06-20 | End: 2024-06-26

## 2023-06-20 ASSESSMENT — ENCOUNTER SYMPTOMS
DIARRHEA: 0
CHILLS: 0
HEADACHES: 0
HEARTBURN: 0
DYSURIA: 0
COUGH: 0
NAUSEA: 0
ABDOMINAL PAIN: 0
NERVOUS/ANXIOUS: 0
FREQUENCY: 0
CONSTIPATION: 0
SORE THROAT: 0
EYE PAIN: 0
PARESTHESIAS: 0
PALPITATIONS: 0
FEVER: 0
DIZZINESS: 0
WEAKNESS: 0
ARTHRALGIAS: 0
HEMATURIA: 0
HEMATOCHEZIA: 0
SHORTNESS OF BREATH: 0
JOINT SWELLING: 0
MYALGIAS: 0

## 2023-06-20 ASSESSMENT — ACTIVITIES OF DAILY LIVING (ADL): CURRENT_FUNCTION: NO ASSISTANCE NEEDED

## 2023-06-20 ASSESSMENT — PAIN SCALES - GENERAL: PAINLEVEL: NO PAIN (0)

## 2023-06-20 NOTE — PROGRESS NOTES
"SUBJECTIVE:   Bruno is a 72 year old who presents for Preventive Visit.      6/20/2023     9:14 AM   Additional Questions   Roomed by Bev MARYAM   Accompanied by Self     Are you in the first 12 months of your Medicare coverage?  No    Healthy Habits:     In general, how would you rate your overall health?  Good    Frequency of exercise:  None    Do you usually eat at least 4 servings of fruit and vegetables a day, include whole grains    & fiber and avoid regularly eating high fat or \"junk\" foods?  No    Taking medications regularly:  Yes    Medication side effects:  None    Ability to successfully perform activities of daily living:  No assistance needed    Home Safety:  Lack of grab bars in the bathroom    Hearing Impairment:  Difficulty following a conversation in a noisy restaurant or crowded room    In the past 6 months, have you been bothered by leaking of urine?  No    In general, how would you rate your overall mental or emotional health?  Excellent      PHQ-2 Total Score: 0    Additional concerns today:  No        Have you ever done Advance Care Planning? (For example, a Health Directive, POLST, or a discussion with a medical provider or your loved ones about your wishes): No, advance care planning information given to patient to review.  Patient declined advance care planning discussion at this time.       Fall risk  Fallen 2 or more times in the past year?: No  Any fall with injury in the past year?: No    Cognitive Screening   1) Repeat 3 items (Leader, Season, Table)    2) Clock draw: NORMAL  3) 3 item recall: Recalls 3 objects  Results: 3 items recalled: COGNITIVE IMPAIRMENT LESS LIKELY    Mini-CogTM Copyright KAREN Jauregui. Licensed by the author for use in [facility]; reprinted with permission (nina@.[facility]). All rights reserved.      Do you have sleep apnea, excessive snoring or daytime drowsiness?: no    Reviewed and updated as needed this visit by clinical staff   Tobacco  Allergies  Meds "  Problems  Med Hx  Surg Hx  Fam Hx          Reviewed and updated as needed this visit by Provider   Tobacco  Allergies  Meds  Problems  Med Hx  Surg Hx  Fam Hx         Social History     Tobacco Use     Smoking status: Every Day     Packs/day: 0.50     Years: 30.00     Pack years: 15.00     Types: Cigarettes     Smokeless tobacco: Never     Tobacco comments:     1/2 pack daily    Vaping Use     Vaping status: Never Used   Substance Use Topics     Alcohol use: Yes     Comment: 5-6 daily             6/20/2023     9:09 AM   Alcohol Use   Prescreen: >3 drinks/day or >7 drinks/week? Yes   AUDIT SCORE  6         6/20/2023     9:09 AM   AUDIT - Alcohol Use Disorders Identification Test - Reproduced from the World Health Organization Audit 2001 (Second Edition)   1.  How often do you have a drink containing alcohol? 4 or more times a week   2.  How many drinks containing alcohol do you have on a typical day when you are drinking? 3 or 4   3.  How often do you have five or more drinks on one occasion? Less than monthly   4.  How often during the last year have you found that you were not able to stop drinking once you had started? Never   5.  How often during the last year have you failed to do what was normally expected of you because of drinking? Never   6.  How often during the last year have you needed a first drink in the morning to get yourself going after a heavy drinking session? Never   7.  How often during the last year have you had a feeling of guilt or remorse after drinking? Never   8.  How often during the last year have you been unable to remember what happened the night before because of your drinking? Never   9.  Have you or someone else been injured because of your drinking? No   10. Has a relative, friend, doctor or other health care worker been concerned about your drinking or suggested you cut down? No   TOTAL SCORE 6     Do you have a current opioid prescription? No  Do you use any other  controlled substances or medications that are not prescribed by a provider? Alcohol does not view this as an issue offered support       Chronic Kidney Disease Follow-up      Do you take any over the counter pain medicine?: No        Hyperlipidemia Follow-Up      Are you regularly taking any medication or supplement to lower your cholesterol?   Yes- Lipitor    Are you having muscle aches or other side effects that you think could be caused by your cholesterol lowering medication?  No    Hypertension Follow-up      Do you check your blood pressure regularly outside of the clinic? No     Are you following a low salt diet? Yes    Are your blood pressures ever more than 140 on the top number (systolic) OR more   than 90 on the bottom number (diastolic), for example 140/90?       Current providers sharing in care for this patient include:   Patient Care Team:  Charisse Lopez MD as PCP - General (Family Practice)  Charisse Lopez MD as Assigned PCP    The following health maintenance items are reviewed in Epic and correct as of today:  Health Maintenance   Topic Date Due     MICROALBUMIN  Never done     DTAP/TDAP/TD IMMUNIZATION (1 - Tdap) Never done     COVID-19 Vaccine (4 - Moderna series) 01/27/2022     MEDICARE ANNUAL WELLNESS VISIT  04/08/2023     BMP  04/08/2023     LUNG CANCER SCREENING  04/22/2023     LIPID  04/04/2024     NICOTINE/TOBACCO CESSATION COUNSELING Q 1 YR  06/20/2024     ANNUAL REVIEW OF HM ORDERS  06/20/2024     FALL RISK ASSESSMENT  06/20/2024     HEMOGLOBIN  06/20/2024     COLORECTAL CANCER SCREENING  11/09/2027     ADVANCE CARE PLANNING  06/20/2028     HEPATITIS C SCREENING  Completed     PHQ-2 (once per calendar year)  Completed     INFLUENZA VACCINE  Completed     Pneumococcal Vaccine: 65+ Years  Completed     URINALYSIS  Completed     ZOSTER IMMUNIZATION  Completed     AORTIC ANEURYSM SCREENING (SYSTEM ASSIGNED)  Completed     IPV IMMUNIZATION  Aged Out     MENINGITIS IMMUNIZATION  " Aged Out     Labs reviewed in EPIC          Review of Systems   Constitutional: Negative for chills and fever.   HENT: Negative for congestion, ear pain, hearing loss and sore throat.    Eyes: Negative for pain and visual disturbance.   Respiratory: Negative for cough and shortness of breath.    Cardiovascular: Negative for chest pain, palpitations and peripheral edema.   Gastrointestinal: Negative for abdominal pain, constipation, diarrhea, heartburn, hematochezia and nausea.   Genitourinary: Positive for impotence. Negative for dysuria, frequency, genital sores, hematuria, penile discharge and urgency.   Musculoskeletal: Negative for arthralgias, joint swelling and myalgias.   Skin: Negative for rash.   Neurological: Negative for dizziness, weakness, headaches and paresthesias.   Psychiatric/Behavioral: Negative for mood changes. The patient is not nervous/anxious.          OBJECTIVE:   /70   Pulse 67   Temp 97.3  F (36.3  C) (Tympanic)   Resp 16   Ht 1.727 m (5' 8\")   Wt 79.7 kg (175 lb 12.8 oz)   SpO2 98%   BMI 26.73 kg/m   Estimated body mass index is 26.73 kg/m  as calculated from the following:    Height as of this encounter: 1.727 m (5' 8\").    Weight as of this encounter: 79.7 kg (175 lb 12.8 oz).  Physical Exam  GENERAL: healthy, alert and no distress  EYES: Eyes grossly normal to inspection, PERRL and conjunctivae and sclerae normal  HENT: ear canals and TM's normal, nose and mouth without ulcers or lesions  NECK: no adenopathy, no asymmetry, masses, or scars and thyroid normal to palpation  RESP: lungs clear to auscultation - no rales, rhonchi or wheezes  CV: regular rate and rhythm, normal S1 S2, no S3 or S4, no murmur, click or rub, no peripheral edema and peripheral pulses strong  ABDOMEN: soft, nontender, no hepatosplenomegaly, no masses and bowel sounds normal  MS: no gross musculoskeletal defects noted, no edema  SKIN: no suspicious lesions or rashes  NEURO: Normal strength and tone, " mentation intact and speech normal  PSYCH: mentation appears normal, affect normal/bright    Diagnostic Test Results:  Labs reviewed in Epic    ASSESSMENT / PLAN:   (Z00.00) Encounter for Medicare annual wellness exam  (primary encounter diagnosis)  Comment:   Plan: REVIEW OF HEALTH MAINTENANCE PROTOCOL ORDERS,         PRIMARY CARE FOLLOW-UP SCHEDULING            (N18.30) Stage 3 chronic kidney disease, unspecified whether stage 3a or 3b CKD (H)  Comment: Stable no change in treatment plan.   Plan: Albumin Random Urine Quantitative with Creat         Ratio            (I10) Benign essential hypertension  Comment: Stable no change in treatment plan.   Plan: amLODIPine (NORVASC) 5 MG tablet, CBC with         platelets, Comprehensive metabolic panel, Lipid        panel reflex to direct LDL Fasting            (E78.5) Hyperlipidemia LDL goal <100  Comment: Adjust meds as indicated by above labs.   Plan: atorvastatin (LIPITOR) 40 MG tablet            (Z23) Encounter for immunization  Comment:   Plan: Pneumococcal 20 Valent Conjugate (PCV20)            (Z87.891) Personal history of nicotine dependence  Comment: discussed cessation and he will consider this     Plan: CT Chest Lung Cancer Scrn Low Dose wo              Patient has been advised of split billing requirements and indicates understanding: Yes      COUNSELING:  Reviewed preventive health counseling, as reflected in patient instructions        He reports that he has been smoking cigarettes. He has a 15.00 pack-year smoking history. He has never used smokeless tobacco.  Nicotine/Tobacco Cessation Plan:   Information offered: Patient not interested at this time      Appropriate preventive services were discussed with this patient, including applicable screening as appropriate for cardiovascular disease, diabetes, osteopenia/osteoporosis, and glaucoma.  As appropriate for age/gender, discussed screening for colorectal cancer, prostate cancer, breast cancer, and  cervical cancer. Checklist reviewing preventive services available has been given to the patient.    Reviewed patients plan of care and provided an AVS. The Basic Care Plan (routine screening as documented in Health Maintenance) for Bruno meets the Care Plan requirement. This Care Plan has been established and reviewed with the Patient.      Charisse Lopez MD  [facility]    Identified Health Risks:    I have reviewed Opioid Use Disorder and Substance Use Disorder risk factors and made any needed referrals.

## 2023-06-20 NOTE — PATIENT INSTRUCTIONS
Patient Education   Personalized Prevention Plan  You are due for the preventive services outlined below.  Your care team is available to assist you in scheduling these services.  If you have already completed any of these items, please share that information with your care team to update in your medical record.  Health Maintenance Due   Topic Date Due     Kidney Microalbumin Urine Test  Never done     Diptheria Tetanus Pertussis (DTAP/TDAP/TD) Vaccine (1 - Tdap) Never done     COVID-19 Vaccine (4 - Moderna series) 01/27/2022     Annual Wellness Visit  04/08/2023     Basic Metabolic Panel  04/08/2023     ANNUAL REVIEW OF HM ORDERS  04/08/2023     LUNG CANCER SCREENING  04/22/2023     Hemoglobin  04/08/2023     Pneumococcal Vaccine (2 - PCV) 04/08/2023       Exercise for a Healthier Heart  You may wonder how you can improve the health of your heart. If you re thinking about exercise, you re on the right track. You don t need to become an athlete. But you do need a certain amount of brisk exercise to help strengthen your heart. If you have been diagnosed with a heart condition, your healthcare provider may advise exercise to help your condition. To help make exercise a habit, choose safe, fun activities.      Exercise with a friend. When activity is fun, you're more likely to stick with it.     Before you start  Check with your healthcare provider before starting an exercise program. This is especially important if you haven't been active for a while. It's also important if you have a long-term (chronic) health problem such as heart disease, diabetes, or obesity. Also check with your provider if you're at high risk for having these problems.   Why exercise?  Exercising regularly offers many healthy rewards. It can help you do all of these:     Improve your blood cholesterol level to help prevent further heart trouble.    Lower your blood pressure to help prevent a stroke or heart attack.    Control diabetes or  reduce your risk of getting this disease.    Improve your heart and lung function.    Reach and stay at a healthy weight.    Make your muscles stronger so you can stay active.    Prevent falls and fractures by slowing the loss of bone mass (osteoporosis).    Manage stress better.    Improve your sense of self and your body image.  Exercise tips      Ease into your routine. Set small goals. Then build on them. Talk with your healthcare provider first before starting an exercise routine if you're not sure what your activity level should be.    Exercise on most days. Aim for a total of at least 150 minutes (2 hours and 30 minutes) or more of moderate-intensity aerobic activity each week. You could also do 75 minutes (1 hour and 15 minutes) or more of vigorous-intensity aerobic activity each week. Or try for a combination of both. Moderate activity means that you breathe heavier and your heart rate increases, but you can still talk. Think about doing at least 30 minutes of moderate exercise, 5 times a week. It's OK to work up to the 30-minute period over time. Examples of moderate-intensity activity are brisk walking, gardening, and water aerobics.    Step up your daily activity level.  Along with your exercise program, try being more active the whole day. Walk instead of drive. Or park further away so that you take more steps each day. Do more household tasks or yard work. You may not be able to meet the advised amount of physical activity. But doing some moderate- or vigorous-intensity aerobic activity can help reduce your risk for heart disease. Your healthcare provider can help you figure out what is best for you.    Choose 1 or more activities you enjoy.  Walking is one of the easiest things you can do. You can also try swimming, riding a bike, dancing, or taking an exercise class.    Call 911  Call 911 right away if any of these occur:     Chest pain that doesn't go away quickly with rest    New burning,  tightness, pressure, or heaviness in your chest, neck, shoulders, back, or arms    Abnormal or severe shortness of breath    A very fast or irregular heartbeat (palpitations)    Fainting  When to call your healthcare provider  Call your healthcare provider if you have any of these:     Dizziness or lightheadedness    Mild shortness of breath or chest pain    Increased or new joint or muscle pain    [employer] last reviewed this educational content on 7/1/2022 2000-2022 [employer]. All rights reserved. This information is not intended as a substitute for professional medical care. Always follow your healthcare professional's instructions.         Patient Education   Alcohol Use     Many people can enjoy a glass of wine or beer without any negative consequences to their health. According to the Centers for Disease Control and Prevention (CDC), having one or fewer drinks per day for women and two or fewer per day for men is considered moderate drinking.     When people drink more than moderately, it can become concerning. Excessive drinking is defined as consuming 15 drinks or more per week for men and 8 drinks or more per week for women. There are various health problems associated with excessive drinking, which include:    Damage to vital organs like the heart, brain, liver and pancreas    Harm to the digestive tract    Weaken the immune system    Higher risk for heart disease and cancer    There are many resources available to people who are addicted to alcohol. A counselor or other health care provider can give you support. So can a , , or rabbi who is trained in substance abuse counseling. Friends and family may also help once you are connected with professionals.           Understanding USDA MyPlate  The USDA has guidelines to help you make healthy food choices. These are called MyPlate. MyPlate shows the food groups that make up healthy meals using the image of a place setting.  Before you eat, think about the healthiest choices for what to put on your plate or in your cup or bowl. To learn more about building a healthy plate, visit www.choosemyplate.gov.     The food groups    Fruits. Any fruit or 100% fruit juice counts as part of the Fruit Group. Fruits may be fresh, canned, frozen, or dried, and may be whole, cut-up, or pureed. Make 1/2 of your plate fruits and vegetables.    Vegetables. Any vegetable or 100% vegetable juice counts as a member of the Vegetable Group. Vegetables may be fresh, frozen, canned, or dried. They can be served raw or cooked and may be whole, cut-up, or mashed. Make 1/2 of your plate fruits and vegetables.    Grains. All foods made from grains are part of the Grains Group. These include wheat, rice, oats, cornmeal, and barley. Grains are often used to make foods such as bread, pasta, oatmeal, cereal, tortillas, and grits. Grains should be no more than 1/4 of your plate. At least half of your grains should be whole grains.    Protein. This group includes meat, poultry, seafood, beans and peas, eggs, processed soy products (such as tofu), nuts (including nut butters), and seeds. Make protein choices no more than 1/4 of your plate. Meat and poultry choices should be lean or low fat.    Dairy. The Dairy Group includes all fluid milk products and foods made from milk that contain calcium, such as yogurt and cheese. (Foods that have little calcium, such as cream, butter, and cream cheese, are not part of this group.) Most dairy choices should be low-fat or fat-free.    Oils. Oils aren't a food group, but they do contain essential nutrients. However it's important to watch your intake of oils. These are fats that are liquid at room temperature. They include canola, corn, olive, soybean, vegetable, and sunflower oil. Foods that are mainly oil include mayonnaise, certain salad dressings, and soft margarines. You likely already get your daily oil allowance from the foods  you eat.  Things to limit  Eating healthy also means limiting these things in your diet:    Salt (sodium). Many processed foods have a lot of sodium. To keep sodium intake down, eat fresh vegetables, meats, poultry, and seafood when possible. Purchase low-sodium, reduced-sodium, or no-salt-added food products at the store. And don't add salt to your meals at home. Instead, season them with herbs and spices such as dill, oregano, cumin, and paprika. Or try adding flavor with lemon or lime zest and juice.    Saturated fat. Saturated fats are most often found in animal products such as beef, pork, and chicken. They are often solid at room temperature, such as butter. To reduce your saturated fat intake, choose leaner cuts of meat and poultry. And try healthier cooking methods such as grilling, broiling, roasting, or baking. For a simple lower-fat swap, use plain nonfat yogurt instead of mayonnaise when making potato salad or macaroni salad.    Added sugars. These are sugars added to foods. They are in foods such as ice cream, candy, soda, fruit drinks, sports drinks, energy drinks, cookies, pastries, jams, and syrups. Cut down on added sugars by sharing sweet treats with a family member or friend. You can also choose fruit for dessert, and drink water or other unsweetened beverages.  [employer] last reviewed this educational content on 6/1/2020 2000-2022 [employer]. All rights reserved. This information is not intended as a substitute for professional medical care. Always follow your healthcare professional's instructions.          Signs of Hearing Loss  Hearing loss is a problem shared by many people. In fact, it's one of the most common health problems, particularly as people age. Most people aged 65 and older have some hearing loss. By age 80, almost everyone does. Hearing loss often occurs slowly over the years. So, you may not realize your hearing has gotten worse.   When sudden hearing loss  occurs, it's important to contact your healthcare provider right away. Your provider will do a medical exam and a hearing exam as soon as possible. This is to help find the cause and type of your sudden hearing loss. Based on your diagnosis, your healthcare provider will discuss possible treatments.      Hearing much better with one ear can be a sign of hearing loss.     Have your hearing checked  Call your healthcare provider if you:     Have to strain to hear normal conversation    Have to watch other people s faces very carefully to follow what they re saying    Need to ask people to repeat what they ve said    Often misunderstand what people are saying    Turn the volume of the television or radio up so high that others complain    Feel that people are mumbling when they re talking to you    Find that the effort to hear leaves you feeling tired and irritated    Notice, when using the phone, that you hear better with one ear than the other  [employer] last reviewed this educational content on 6/1/2022 2000-2022 [employer]. All rights reserved. This information is not intended as a substitute for professional medical care. Always follow your healthcare professional's instructions.

## 2023-06-20 NOTE — PROGRESS NOTES
"    He is at risk for lack of exercise and has been provided with information to increase physical activity for the benefit of his well-being.  The patient reports that he drinks more than one alcoholic drink per day but denies binge or excessive drinking. He was counseled and given information about possible harmful effects of excessive alcohol intake.  The patient was counseled and encouraged to consider modifying their diet and eating habits. He was provided with information on recommended healthy diet options.  The patient was provided with written information regarding signs of hearing loss.  Answers for HPI/ROS submitted by the patient on 6/20/2023  In general, how would you rate your overall physical health?: good  Frequency of exercise:: None  Do you usually eat at least 4 servings of fruit and vegetables a day, include whole grains & fiber, and avoid regularly eating high fat or \"junk\" foods? : No  Taking medications regularly:: Yes  Medication side effects:: None  Activities of Daily Living: no assistance needed  Home safety: lack of grab bars in the bathroom  Hearing Impairment:: difficulty following a conversation in a noisy restaurant or crowded room  In the past 6 months, have you been bothered by leaking of urine?: No  abdominal pain: No  Blood in stool: No  Blood in urine: No  chest pain: No  chills: No  congestion: No  constipation: No  cough: No  diarrhea: No  dizziness: No  ear pain: No  eye pain: No  nervous/anxious: No  fever: No  frequency: No  genital sores: No  headaches: No  hearing loss: No  heartburn: No  arthralgias: No  joint swelling: No  peripheral edema: No  mood changes: No  myalgias: No  nausea: No  dysuria: No  palpitations: No  Skin sensation changes: No  sore throat: No  urgency: No  rash: No  shortness of breath: No  visual disturbance: No  weakness: No  impotence: Yes  penile discharge: No  In general, how would you rate your overall mental or emotional health?: " excellent  Additional concerns today:: No

## 2023-06-20 NOTE — NURSING NOTE
"Chief Complaint   Patient presents with     Wellness Visit     /70   Pulse 67   Temp 97.3  F (36.3  C) (Tympanic)   Resp 16   Ht 1.727 m (5' 8\")   Wt 79.7 kg (175 lb 12.8 oz)   SpO2 98%   BMI 26.73 kg/m   Estimated body mass index is 26.73 kg/m  as calculated from the following:    Height as of this encounter: 1.727 m (5' 8\").    Weight as of this encounter: 79.7 kg (175 lb 12.8 oz).  Patient presents to the clinic using No DME      Health Maintenance that is potentially due pending provider review:    Health Maintenance Due   Topic Date Due     MICROALBUMIN  Never done     DTAP/TDAP/TD IMMUNIZATION (1 - Tdap) Never done     COVID-19 Vaccine (4 - Moderna series) 01/27/2022     NICOTINE/TOBACCO CESSATION COUNSELING Q 1 YR  04/08/2023     MEDICARE ANNUAL WELLNESS VISIT  04/08/2023     BMP  04/08/2023     ANNUAL REVIEW OF HM ORDERS  04/08/2023     LUNG CANCER SCREENING  04/22/2023     HEMOGLOBIN  04/08/2023     Pneumococcal Vaccine: 65+ Years (2 - PCV) 04/08/2023        n/a        "

## 2023-06-28 ENCOUNTER — TRANSFERRED RECORDS (OUTPATIENT)
Dept: HEALTH INFORMATION MANAGEMENT | Facility: CLINIC | Age: 73
End: 2023-06-28

## 2023-06-28 RX ORDER — ATORVASTATIN CALCIUM 40 MG/1
80 TABLET, FILM COATED ORAL DAILY
Qty: 180 TABLET | Refills: 3 | Status: SHIPPED | OUTPATIENT
Start: 2023-06-28 | End: 2024-06-26

## 2023-06-29 ENCOUNTER — HOSPITAL ENCOUNTER (OUTPATIENT)
Dept: CT IMAGING | Facility: CLINIC | Age: 73
Discharge: HOME | End: 2023-06-29
Attending: FAMILY MEDICINE
Payer: COMMERCIAL

## 2023-06-29 ENCOUNTER — HOSPITAL ENCOUNTER (OUTPATIENT)
Dept: ULTRASOUND IMAGING | Facility: CLINIC | Age: 73
Discharge: HOME | End: 2023-06-29
Attending: FAMILY MEDICINE
Payer: COMMERCIAL

## 2023-06-29 DIAGNOSIS — I71.43 INFRARENAL ABDOMINAL AORTIC ANEURYSM (AAA) WITHOUT RUPTURE (H): ICD-10-CM

## 2023-06-29 DIAGNOSIS — Z87.891 PERSONAL HISTORY OF NICOTINE DEPENDENCE: ICD-10-CM

## 2023-06-29 PROCEDURE — 76775 US EXAM ABDO BACK WALL LIM: CPT

## 2023-06-29 PROCEDURE — 71271 CT THORAX LUNG CANCER SCR C-: CPT

## 2023-07-05 ENCOUNTER — TELEPHONE (OUTPATIENT)
Dept: FAMILY MEDICINE | Facility: CLINIC | Age: 73
End: 2023-07-05
Payer: COMMERCIAL

## 2023-07-05 ENCOUNTER — TRANSFERRED RECORDS (OUTPATIENT)
Dept: HEALTH INFORMATION MANAGEMENT | Facility: CLINIC | Age: 73
End: 2023-07-05
Payer: COMMERCIAL

## 2023-07-05 DIAGNOSIS — I71.43 INFRARENAL ABDOMINAL AORTIC ANEURYSM (AAA) WITHOUT RUPTURE (H): Primary | ICD-10-CM

## 2023-07-05 NOTE — TELEPHONE ENCOUNTER
fi pt calls back please give the following     Notified by optho that pt has retinal artery occlusion     Needs CVA work  Up     Ordered US carotids and ECHO  Needs this asap    Should change ASA dose to 325mg     Tried to call pt with this but no answer and left message to call back on ID'd voicemail   Pt also has chest ct and US with result note attached

## 2023-07-05 NOTE — TELEPHONE ENCOUNTER
Patient notified. Phone numbers given  to schedule echo and ultrasound and to notify care team if he has any trouble scheduling. Reviewed Result notes with patient.   Did you want to place the vascular referral at this time?  Care Team, please call patient with phone number to schedule with Vascular once ordered.  Pauline CASTREJON RN

## 2023-07-06 ENCOUNTER — HOSPITAL ENCOUNTER (OUTPATIENT)
Dept: CARDIOLOGY | Facility: CLINIC | Age: 73
Discharge: HOME | End: 2023-07-06
Attending: FAMILY MEDICINE | Admitting: FAMILY MEDICINE
Payer: COMMERCIAL

## 2023-07-06 ENCOUNTER — TELEPHONE (OUTPATIENT)
Dept: OTHER | Facility: CLINIC | Age: 73
End: 2023-07-06

## 2023-07-06 DIAGNOSIS — I10 BENIGN ESSENTIAL HYPERTENSION: ICD-10-CM

## 2023-07-06 LAB — LVEF ECHO: NORMAL

## 2023-07-06 PROCEDURE — 93306 TTE W/DOPPLER COMPLETE: CPT

## 2023-07-06 PROCEDURE — 93306 TTE W/DOPPLER COMPLETE: CPT | Mod: 26 | Performed by: INTERNAL MEDICINE

## 2023-07-06 RX ORDER — ASPIRIN 325 MG
325 TABLET ORAL DAILY
COMMUNITY
Start: 2023-07-05 | End: 2024-07-01

## 2023-07-06 NOTE — TELEPHONE ENCOUNTER
Pt referred to [facility] by Charisse Lopez MD for AAA.    Pt needs to be scheduled for NEW VASCULAR PATIENT consult with any Vascular Medicine provider.  Will route to scheduling to coordinate an appointment at next available.    Appt note: Ref by Dr. Charisse Lopez for 4.5cm AAA; notes and imaging in Epic.    JAILYN BoggsN, RN-[facility]

## 2023-07-06 NOTE — TELEPHONE ENCOUNTER
Called pt and left message for him to call back to discuss with me any questions he had regarding the results of his tests  Vascular order placed they will call him

## 2023-07-07 ENCOUNTER — HOSPITAL ENCOUNTER (OUTPATIENT)
Dept: ULTRASOUND IMAGING | Facility: CLINIC | Age: 73
Discharge: HOME | End: 2023-07-07
Attending: FAMILY MEDICINE | Admitting: FAMILY MEDICINE
Payer: COMMERCIAL

## 2023-07-07 DIAGNOSIS — H53.9 UNSPECIFIED VISUAL DISTURBANCE: ICD-10-CM

## 2023-07-07 PROCEDURE — 93880 EXTRACRANIAL BILAT STUDY: CPT

## 2023-07-07 NOTE — TELEPHONE ENCOUNTER
Future Appointments   Date Time Provider Department Center   7/11/2023 12:20 PM Pearl Sanderson PA-C [facility]

## 2023-07-10 NOTE — TELEPHONE ENCOUNTER
I did call [facility] back and questions answered   Please add this number as alternative number for them in demographics  Cell

## 2023-07-10 NOTE — TELEPHONE ENCOUNTER
Pt said he has been trying to connect with you regarding questions.  Pt I asking if you can call [phone]  [facility] [place] [facility]

## 2023-07-11 ENCOUNTER — TELEPHONE (OUTPATIENT)
Dept: OTHER | Facility: CLINIC | Age: 73
End: 2023-07-11

## 2023-07-11 ENCOUNTER — OFFICE VISIT (OUTPATIENT)
Dept: OTHER | Facility: CLINIC | Age: 73
DRG: 038 | End: 2023-07-11
Attending: PHYSICIAN ASSISTANT
Payer: COMMERCIAL

## 2023-07-11 ENCOUNTER — HOSPITAL ENCOUNTER (OUTPATIENT)
Dept: CT IMAGING | Facility: CLINIC | Age: 73
Discharge: HOME | DRG: 038 | End: 2023-07-11
Attending: PHYSICIAN ASSISTANT
Payer: COMMERCIAL

## 2023-07-11 VITALS
HEART RATE: 61 BPM | HEIGHT: 68 IN | OXYGEN SATURATION: 98 % | SYSTOLIC BLOOD PRESSURE: 179 MMHG | DIASTOLIC BLOOD PRESSURE: 83 MMHG | WEIGHT: 178 LBS | BODY MASS INDEX: 26.98 KG/M2

## 2023-07-11 VITALS
HEART RATE: 67 BPM | BODY MASS INDEX: 27.1 KG/M2 | DIASTOLIC BLOOD PRESSURE: 60 MMHG | OXYGEN SATURATION: 97 % | WEIGHT: 178.2 LBS | SYSTOLIC BLOOD PRESSURE: 132 MMHG

## 2023-07-11 DIAGNOSIS — E78.1 HYPERTRIGLYCERIDEMIA: ICD-10-CM

## 2023-07-11 DIAGNOSIS — I65.23 BILATERAL CAROTID ARTERY STENOSIS: Primary | ICD-10-CM

## 2023-07-11 DIAGNOSIS — I65.22 SYMPTOMATIC STENOSIS OF LEFT CAROTID ARTERY: Primary | ICD-10-CM

## 2023-07-11 DIAGNOSIS — I71.43 INFRARENAL ABDOMINAL AORTIC ANEURYSM (AAA) WITHOUT RUPTURE (H): ICD-10-CM

## 2023-07-11 DIAGNOSIS — Z72.0 TOBACCO ABUSE: ICD-10-CM

## 2023-07-11 DIAGNOSIS — H34.232 RETINAL ARTERY OCCLUSION, BRANCH, LEFT: ICD-10-CM

## 2023-07-11 DIAGNOSIS — I65.23 BILATERAL CAROTID ARTERY STENOSIS: ICD-10-CM

## 2023-07-11 DIAGNOSIS — I65.22 CAROTID STENOSIS, LEFT: ICD-10-CM

## 2023-07-11 DIAGNOSIS — I10 BENIGN ESSENTIAL HYPERTENSION: ICD-10-CM

## 2023-07-11 PROCEDURE — 250N000011 HC RX IP 250 OP 636: Performed by: PHYSICIAN ASSISTANT

## 2023-07-11 PROCEDURE — 70496 CT ANGIOGRAPHY HEAD: CPT

## 2023-07-11 PROCEDURE — 250N000009 HC RX 250: Performed by: PHYSICIAN ASSISTANT

## 2023-07-11 PROCEDURE — 99215 OFFICE O/P EST HI 40 MIN: CPT | Mod: 25 | Performed by: PHYSICIAN ASSISTANT

## 2023-07-11 PROCEDURE — 70498 CT ANGIOGRAPHY NECK: CPT

## 2023-07-11 PROCEDURE — G0463 HOSPITAL OUTPT CLINIC VISIT: HCPCS

## 2023-07-11 PROCEDURE — 99205 OFFICE O/P NEW HI 60 MIN: CPT | Performed by: SURGERY

## 2023-07-11 PROCEDURE — G0463 HOSPITAL OUTPT CLINIC VISIT: HCPCS | Mod: 27

## 2023-07-11 RX ORDER — METOPROLOL TARTRATE 25 MG/1
25 TABLET, FILM COATED ORAL 2 TIMES DAILY
Qty: 60 TABLET | Refills: 6 | Status: SHIPPED | OUTPATIENT
Start: 2023-07-11 | End: 2024-01-04

## 2023-07-11 RX ORDER — CLOPIDOGREL BISULFATE 75 MG/1
75 TABLET ORAL DAILY
Qty: 30 TABLET | Refills: 3 | Status: SHIPPED | OUTPATIENT
Start: 2023-07-11 | End: 2024-07-01

## 2023-07-11 RX ORDER — OMEGA-3-ACID ETHYL ESTERS 1 G/1
2 CAPSULE, LIQUID FILLED ORAL 2 TIMES DAILY
Qty: 120 CAPSULE | Refills: 11 | Status: SHIPPED | OUTPATIENT
Start: 2023-07-11 | End: 2024-05-03

## 2023-07-11 RX ORDER — CLOPIDOGREL BISULFATE 75 MG/1
75 TABLET ORAL DAILY
Qty: 30 TABLET | Refills: 3 | Status: CANCELLED | OUTPATIENT
Start: 2023-07-11

## 2023-07-11 RX ORDER — IOPAMIDOL 755 MG/ML
75 INJECTION, SOLUTION INTRAVASCULAR ONCE
Status: COMPLETED | OUTPATIENT
Start: 2023-07-11 | End: 2023-07-11

## 2023-07-11 RX ADMIN — IOPAMIDOL 75 ML: 755 INJECTION, SOLUTION INTRAVENOUS at 14:04

## 2023-07-11 RX ADMIN — SODIUM CHLORIDE 100 ML: 9 INJECTION, SOLUTION INTRAVENOUS at 14:04

## 2023-07-11 ASSESSMENT — ENCOUNTER SYMPTOMS
GASTROINTESTINAL NEGATIVE: 1
ENDOCRINE NEGATIVE: 1
ALLERGIC/IMMUNOLOGIC NEGATIVE: 1
RESPIRATORY NEGATIVE: 1
LEFT EYE: 1
MUSCULOSKELETAL NEGATIVE: 1
CARDIOVASCULAR NEGATIVE: 1
NEUROLOGICAL NEGATIVE: 1
PSYCHIATRIC NEGATIVE: 1
CONSTITUTIONAL NEGATIVE: 1
HEMATOLOGIC/LYMPHATIC NEGATIVE: 1

## 2023-07-11 NOTE — PROGRESS NOTES
[facility]        Patient is here for a consult to discuss Abdominal aortic aneurysm (AAA).    Pt is currently taking Aspirin and Statin.    BP (!) 161/77 (BP Location: Left arm, Patient Position: Chair, Cuff Size: Adult Regular)   Pulse 67   Wt 178 lb 3.2 oz (80.8 kg)   SpO2 97%   BMI 27.10 kg/m      The provider has been notified that the patient has no concerns.     Questions patient would like addressed today are: N/A.    Refills are needed: N/A    Has homecare services and agency name:  Agustina Rivera MA

## 2023-07-11 NOTE — PROGRESS NOTES
[facility] INITIAL VASCULAR MEDICINE CONSULT      PRIMARY HEALTH CARE PROVIDER:  Charisse Lopez      REFERRING HEALTH CARE PROVIDER;  Charisse Lopez      REASON FOR CONSULT:  Enlarging AAA and carotid stenosis with recent retinal artery occlusion      HPI: Bruno Baig is a 72 year old male smoker from [place], [place] with a history of gout, hypertension, hyperlipidemia, and pre-diabetes who has been followed over the years for an AAA. Recent surveillance ultrasound showed that the fusiform infrarenal AAA has increased in size from 3.6 x 4.0 cm in 2022 to 4.0 x 4.5 cm. He was subsequently referred to our vascular clinic. In the mean time, he developed a left retinal artery occlusion (diagnosed by Ophthalmology) and underwent a carotid duplex which showed 50-69% stenosis in the left carotid artery, but this may be higher and above 70% given ratio. He still has about a 25% defect/haziness in left eye. Recent echocardiogram with no evidence atrial fibrillation. Recent cardiac stress test one year ago was normal.     He has had hyperlipidemia/hypertriglyceridemia for which he has been on Gemfibrozil in the past as well as fenofibrate. Most recently he is on Atorvastatin and the dose of this was just increased from 40 mg daily to 80 mg daily with LDL noted to be 86. Fenofibrate was stopped in the past due to acute kidney injury. Most recent triglycerides was 501. He is pre-diabetic.     He is on amlodipine for his hypertension. He had been on lisinopril , losartan-hydrochlorothiazide in the past, but these were all stopped due to NOEL and/or hypercalcemia. He also has a history of gout.     He states he can walk fine, denies any claudication. He feels what limits his walking is hip joint pain.     He is smoking about 1/2 pack per day. He wants to quit. His wife is smoking as well and he is hoping they can quit together.         PAST MEDICAL  HISTORY  Pre-diabetes  Hypertension  Hyperlipidemia  Gout  AAA  Retinal artery occlusion    CURRENT MEDICATIONS  amLODIPine (NORVASC) 5 MG tablet, Take 1 tablet (5 mg) by mouth daily  aspirin (ASA) 325 MG tablet, Take 1 tablet (325 mg) by mouth daily  atorvastatin (LIPITOR) 40 MG tablet, Take 2 tablets (80 mg) by mouth daily  blood glucose monitoring (NO BRAND SPECIFIED) meter device kit, 1 kit by In Vitro route 2 times daily Use to test blood sugar 2 times daily or as directed. (Patient not taking: Reported on 2/15/2023)  blood glucose monitoring (NO BRAND SPECIFIED) test strip, Use to test blood sugars 2 times daily or as directed (Patient not taking: Reported on 2/15/2023)  Cholecalciferol (VITAMIN D3) 2000 units CAPS, Take 1 capsule by mouth every morning   Cyanocobalamin (B-12) 1000 MCG CAPS, Take 1 capsule by mouth  fish oil-omega-3 fatty acids 1000 MG capsule, Take 1 g by mouth every morning   Garlic 1000 MG CAPS, Take 1 capsule by mouth every morning  (Patient not taking: Reported on 2/15/2023)  indomethacin (INDOCIN) 25 MG capsule, TAKE 1 CAPSULE (25 MG) BY MOUTH 2 TIMES DAILY (WITH MEALS)**NOT COVERED** (Patient not taking: Reported on 2/15/2023)    No current facility-administered medications on file prior to visit.      PAST SURGICAL HISTORY:  Past Surgical History:   Procedure Laterality Date     COLONOSCOPY N/A 11/9/2017    Procedure: COLONOSCOPY;  colonoscopy;  Surgeon: Justice Horner MD;  Location: [facility]       ALLERGIES   No Known Allergies    FAMILY HISTORY  Family History   Problem Relation Age of Onset     Other Cancer Mother         lung     Heart Failure Father      Lung Cancer Sister          SOCIAL HISTORY  Social History     Socioeconomic History     Marital status:      Spouse name: Not on file     Number of children: Not on file     Years of education: Not on file     Highest education level: Not on file   Occupational History     Not on file   Tobacco Use     Smoking status: Every  Day     Packs/day: 0.50     Years: 30.00     Pack years: 15.00     Types: Cigarettes     Smokeless tobacco: Never     Tobacco comments:     1/2 pack daily    Vaping Use     Vaping Use: Never used   Substance and Sexual Activity     Alcohol use: Yes     Comment: 5-6 daily     Drug use: No   Other Topics Concern     Parent/sibling w/ CABG, MI or angioplasty before 65F 55M? No       ROS:   General: No change in weight, sleep or appetite.  Normal energy.  No fever or chills  Eyes: + recent left retinal artery occlusion.   ENT: No problems with ears, nose or throat.  No difficulty swallowing.  Resp: No coughing, wheezing or shortness of breath  CV: No chest pains or palpitations  GI: No nausea, vomiting,  heartburn, abdominal pain, diarrhea, constipation or change in bowel habits  : No urinary frequency or dysuria, bladder or kidney problems  Musculoskeletal: No significant muscle or joint pains  Neurologic: No headaches, numbness, tingling, weakness, problems with balance or coordination  Psychiatric: No problems with anxiety, depression or mental health  Heme/immune/allergy: No history of bleeding or clotting problems or anemia.  No allergies or immune system problems  Endocrine: No history of thyroid disease, diabetes or other endocrine disorders  Skin: No rashes,worrisome lesions or skin problems  Vascular:  No claudication, lifestyle limiting or otherwise; no ischemic rest pain; no non-healing ulcers. No weakness, No loss of sensation.    EXAM:  /60 (BP Location: Right arm)   Pulse 67   Wt 178 lb 3.2 oz (80.8 kg)   SpO2 97%   BMI 27.10 kg/m    In general, the patient is a pleasant male in no apparent distress.    HEENT: NC/AT.  PERRLA.  EOMI.  Sclerae white, not injected. Dentition intact.    Neck: Carotids +2/2 bilaterally without bruits.   Heart: RRR. Normal S1, S2 splits physiologically. No murmur, rub, click, or gallop.  Lungs: CTA.  No ronchi, wheezes, rales.    Abdomen: Soft, nontender,  nondistended.  Extremities: No clubbing, cyanosis, or edema. He has weakly palpable PT pulses bilaterally. Audible multiphasic pulses in left DP and bilateral PT and weaker monophasic pulse right DP. Popliteal arteries without bounding pulse.  No wounds.   Neurologic: CN 2-12 intact with exception of visual defect on left eye.         Labs:  LIPID RESULTS:  Lab Results   Component Value Date    CHOL 186 06/20/2023    CHOL 195 05/24/2021    HDL 42 06/20/2023    HDL 37 (L) 05/24/2021    LDL  06/20/2023      Comment:      Cannot estimate LDL when triglyceride exceeds 400 mg/dL    LDL 86 06/20/2023    LDL 76 04/08/2022    LDL 82 05/24/2021    TRIG 503 (H) 06/20/2023    TRIG 378 (H) 05/24/2021       LIVER ENZYME RESULTS:  Lab Results   Component Value Date    AST 31 06/20/2023    AST 24 06/28/2021    ALT 26 06/20/2023    ALT 27 06/28/2021       CBC RESULTS:  Lab Results   Component Value Date    WBC 6.2 06/20/2023    WBC 6.9 06/28/2021    RBC 4.54 06/20/2023    RBC 3.68 (L) 06/28/2021    HGB 14.4 06/20/2023    HGB 11.9 (L) 06/28/2021    HCT 41.8 06/20/2023    HCT 34.4 (L) 06/28/2021    MCV 92 06/20/2023    MCV 94 06/28/2021    MCH 31.7 06/20/2023    MCH 32.3 06/28/2021    MCHC 34.4 06/20/2023    MCHC 34.6 06/28/2021    RDW 11.4 06/20/2023    RDW 12.1 06/28/2021     06/20/2023     06/28/2021       BMP RESULTS:  Lab Results   Component Value Date     06/20/2023     06/28/2021    POTASSIUM 4.6 06/20/2023    POTASSIUM 4.6 04/08/2022    POTASSIUM 4.2 06/28/2021    CHLORIDE 102 06/20/2023    CHLORIDE 106 04/08/2022    CHLORIDE 109 06/28/2021    CO2 26 06/20/2023    CO2 27 04/08/2022    CO2 21 06/28/2021    ANIONGAP 10 06/20/2023    ANIONGAP 5 04/08/2022    ANIONGAP 8 06/28/2021     (H) 06/20/2023     (H) 04/08/2022     (H) 06/28/2021    BUN 17.8 06/20/2023    BUN 17 04/08/2022    BUN 22 06/28/2021    CR 1.19 (H) 06/20/2023    CR 1.24 06/28/2021    GFRESTIMATED 64 06/20/2023     GFRESTIMATED 57 (L) 06/28/2021    GFRESTBLACK 67 06/28/2021    ROLDAN 10.4 (H) 06/20/2023    ROLDAN 9.3 06/28/2021        A1C RESULTS:  Lab Results   Component Value Date    A1C 5.7 (H) 04/08/2022    A1C 5.6 07/01/2019       THYROID RESULTS:  Lab Results   Component Value Date    TSH 2.47 07/01/2019       Procedures:   BILATERAL CAROTID ULTRASOUND 7/7/2023 3:43 PM      HISTORY: Unspecified visual disturbance     COMPARISON: None.     RIGHT CAROTID FINDINGS: There is mild mixed atherosclerotic plaque at  the carotid bifurcation and proximal internal carotid artery.  Right ICA PSV: 139 cm/sec.  Right ICA EDV: 36 cm/sec.  Right ICA/CCA PSV Ratio: 1.2   These indicate less than 50% diameter stenosis of the right ICA.   Right Vertebral: Antegrade flow.   Right ECA: Antegrade flow. Velocity is considerably elevated at 521  cm/second.     LEFT CAROTID FINDINGS: There is at least moderate atherosclerotic  plaque throughout the left carotid arterial systems, in some areas  difficult to visualize lumen.  Left ICA PSV: 204 cm/sec.  Left ICA EDV: 38 cm/sec.  Left ICA/CCA PSV Ratio: 4.4   These indicate 50 - 69% diameter stenosis of the left ICA.   Left Vertebral: Antegrade flow.   Left ECA: Antegrade flow.      Causes of Decreased Accuracy: Discrepant findings in the left carotid  arterial system with peak systolic and diastolic velocity suggestive  of 50-69% stenosis, though ratio is suggestive of greater than 70%  stenosis. In areas, lumen somewhat difficult to assess given degree of  atherosclerotic plaque.                                                                      IMPRESSION:   1. Less than 50% diameter stenosis of the right ICA relative to the  distal ICA diameter.   2. 50-69% diameter stenosis of the left ICA relative to the distal ICA  diameter, though, please see above description of discrepancy.  3. Significant velocity elevation in the right external carotid  Artery.      CT CHEST LUNG CANCER SCREEN LOW DOSE  WITHOUT  6/29/2023 10:10 AM     HISTORY: Lung cancer screening. No chest CT for lung cancer screening  in the last year; 50-80 years; >= 20 pack-year smoking history.  Current smoker. Personal history of nicotine dependence.     TECHNIQUE: Scans obtained from the apices through the diaphragm  without IV contrast. Low dose CT chest technique was utilized.  Radiation dose for this scan was reduced using automated exposure  control, adjustment of the mA and/or kV according to patient size, or  iterative reconstruction technique.     COMPARISON:  4/22/2022, 7/31/2020     FINDINGS:  Lungs: No pulmonary nodules or masses. No airspace consolidation or  pleural fluid. Central airways are patent.     Additional findings: Heart size is normal. No pericardial effusion.  Moderate to severe coronary artery atherosclerosis is present. Mild  thoracic aortic atherosclerosis is also noted. No enlarged thoracic  lymph nodes.     Partially imaged fusiform aneurysmal dilation of the infrarenal  abdominal aorta 3.3 cm. Colonic diverticulosis is present without  evidence of diverticulitis. No acute osseous abnormality. Small  suspected sebaceous cyst in the upper back to the right of midline.                                                                      IMPRESSION:   1. ACR Assessment Category:  Lung-RADS Category 1. Negative, continue  annual screening, if clinically relevant (please order exam code IMG  2290).   2. Significant Incidental Finding(s): Category S: Yes. Coronary artery  calcium moderate or severe.  3. Partially imaged fusiform aneurysmal dilation of the infrarenal  abdominal aorta.      [facility]  DATE: 6/29/2023  ULTRASOUND ABDOMINAL AORTA     INDICATION: Abdominal aortic aneurysm surveillance     COMPARISON: CT of the abdomen pelvis dated 6/5/2019, ultrasound of the  abdomen dated 4/22/2022     FINDINGS:  THE ABDOMINAL AORTA MEASURES:  Proximal aorta: 2.1 x 2.2 cm  Mid aorta: 2.0 x 1.9 cm  Distal aorta:  4.0 x 4.5 cm versus 3.6 x 4.0 cm on the prior exam  Right common iliac artery: 1.0 cm  Left common iliac artery: 0.8 cm                                                                      IMPRESSION:   1.  4.0 x 4.5 cm infrarenal abdominal aortic aneurysm. This has  increased in size.      Procedure  Complete Echo Adult 7/6/23  ______________________________________________________________________________  Interpretation Summary     Poor image quality.  Left ventricular systolic function is normal.  The visual ejection fraction is 55-60%.  No hemodynamically significant valvular abnormalities on 2D or color flow  imaging.      Assessment and Plan:   1. Symptomatic carotid stenosis with recent left retinal artery occlusion    -Diagnosed recently with left retinal artery occlusion at Ophthalmologist in [place].   -Carotid duplex with 50-69% left carotid artery stenosis, although may be higher given ratio.     Recommendations:   -Obtain CTA head/neck today.   -Will have him be seen by Vascular Surgery today after imaging to discuss results and potential surgical carotid endarterectomy if indicated by imaging results.   -Continue Aspirin.  -He must stop smoking.   -Control blood pressure (see below).     -He has no prior history of trouble with anesthesia.   -No history of sleep apnea.   -Plan is for left carotid endarterectomy on 7/13/23.   -Labs to be done at [facility] clinic tomorrow. He is cleared for the procedure as long as labs return ok. EKG to be done in pre-op.   -He is to take all of his usual medications the day of surgery with a small sip of water, including newly prescribed Plavix.       2. AAA, asymptomatic    -This is slowly enlarging in size, up to 4.0 x 4.5 cm from 3.6 x 4.0 cm the year prior.   -Continues to smoke.     Recommendations:   -Follow-up imaging in 6 months given growth of aneurysm over the past year.   -Continue aspirin and statin.   -Control blood pressure.  -Discussed how he must stop  smoking to prevent further growth of the aneurysm.       3. Hyperlipidemia/Hypetriglycerideima    -Triglycerides 503, LDL 86.   -Moderate to severe calcifications noted on imaging in arteries.  -Unable to tolerate fenofibrate in past (increased creatinine)    Recommendations:   -Atorvastatin was recently increased by PCP to 80 mg daily.   -Will switch from his over the counter fish oil 1 g daily to Lovaza 2 g BID.   -Repeat lipid panel in 3 months. Goal LDL is less than 70.       4. Hypertension    -BP elevated upon arrival to clinic but better upon recheck.   -On amlodipine 5 mg daily.   -Lisinopril, losartan and hydrochlorothiazide stopped in past due to NOEL/side effects. Also has history of gout.     Recommendations:   -Continue Amlodipine.   -Start Metoprolol 25 mg BID given markedly elevated blood pressure on arrival to clinic (170's/70's), carotid stenosis, and AAA.   -Prescription given for him to get a blood pressure cuff for home at a medical supply store. He should check his blood pressure and record values.   -Will follow-up with him in a month to recheck/review home blood pressure values.       5. Ongoing tobacco use    -Continues to smoke 1/2 ppd, but continuing to cut back.     Recommendations:   -He desires to quit. Will try doing this with his wife as she is also needing to quit.   -Discussed importance of quitting smoking. He politely declines any assistance with this right now.             MARY KAY Mcclendon-C      60 minutes spent on the date of the encounter doing chart review, history and exam, documentation, and further activities as noted above.

## 2023-07-11 NOTE — H&P (VIEW-ONLY)
[facility] INITIAL VASCULAR MEDICINE CONSULT      PRIMARY HEALTH CARE PROVIDER:  Charisse Lopez      REFERRING HEALTH CARE PROVIDER;  Charisse Lopez      REASON FOR CONSULT:  Enlarging AAA and carotid stenosis with recent retinal artery occlusion      HPI: Bruno Baig is a 72 year old male smoker from [place], [place] with a history of gout, hypertension, hyperlipidemia, and pre-diabetes who has been followed over the years for an AAA. Recent surveillance ultrasound showed that the fusiform infrarenal AAA has increased in size from 3.6 x 4.0 cm in 2022 to 4.0 x 4.5 cm. He was subsequently referred to our vascular clinic. In the mean time, he developed a left retinal artery occlusion (diagnosed by Ophthalmology) and underwent a carotid duplex which showed 50-69% stenosis in the left carotid artery, but this may be higher and above 70% given ratio. He still has about a 25% defect/haziness in left eye. Recent echocardiogram with no evidence atrial fibrillation. Recent cardiac stress test one year ago was normal.     He has had hyperlipidemia/hypertriglyceridemia for which he has been on Gemfibrozil in the past as well as fenofibrate. Most recently he is on Atorvastatin and the dose of this was just increased from 40 mg daily to 80 mg daily with LDL noted to be 86. Fenofibrate was stopped in the past due to acute kidney injury. Most recent triglycerides was 501. He is pre-diabetic.     He is on amlodipine for his hypertension. He had been on lisinopril , losartan-hydrochlorothiazide in the past, but these were all stopped due to NOEL and/or hypercalcemia. He also has a history of gout.     He states he can walk fine, denies any claudication. He feels what limits his walking is hip joint pain.     He is smoking about 1/2 pack per day. He wants to quit. His wife is smoking as well and he is hoping they can quit together.         PAST MEDICAL  HISTORY  Pre-diabetes  Hypertension  Hyperlipidemia  Gout  AAA  Retinal artery occlusion    CURRENT MEDICATIONS  amLODIPine (NORVASC) 5 MG tablet, Take 1 tablet (5 mg) by mouth daily  aspirin (ASA) 325 MG tablet, Take 1 tablet (325 mg) by mouth daily  atorvastatin (LIPITOR) 40 MG tablet, Take 2 tablets (80 mg) by mouth daily  blood glucose monitoring (NO BRAND SPECIFIED) meter device kit, 1 kit by In Vitro route 2 times daily Use to test blood sugar 2 times daily or as directed. (Patient not taking: Reported on 2/15/2023)  blood glucose monitoring (NO BRAND SPECIFIED) test strip, Use to test blood sugars 2 times daily or as directed (Patient not taking: Reported on 2/15/2023)  Cholecalciferol (VITAMIN D3) 2000 units CAPS, Take 1 capsule by mouth every morning   Cyanocobalamin (B-12) 1000 MCG CAPS, Take 1 capsule by mouth  fish oil-omega-3 fatty acids 1000 MG capsule, Take 1 g by mouth every morning   Garlic 1000 MG CAPS, Take 1 capsule by mouth every morning  (Patient not taking: Reported on 2/15/2023)  indomethacin (INDOCIN) 25 MG capsule, TAKE 1 CAPSULE (25 MG) BY MOUTH 2 TIMES DAILY (WITH MEALS)**NOT COVERED** (Patient not taking: Reported on 2/15/2023)    No current facility-administered medications on file prior to visit.      PAST SURGICAL HISTORY:  Past Surgical History:   Procedure Laterality Date     COLONOSCOPY N/A 11/9/2017    Procedure: COLONOSCOPY;  colonoscopy;  Surgeon: Jutsice Horner MD;  Location: [facility]       ALLERGIES   No Known Allergies    FAMILY HISTORY  Family History   Problem Relation Age of Onset     Other Cancer Mother         lung     Heart Failure Father      Lung Cancer Sister          SOCIAL HISTORY  Social History     Socioeconomic History     Marital status:      Spouse name: Not on file     Number of children: Not on file     Years of education: Not on file     Highest education level: Not on file   Occupational History     Not on file   Tobacco Use     Smoking status: Every  Day     Packs/day: 0.50     Years: 30.00     Pack years: 15.00     Types: Cigarettes     Smokeless tobacco: Never     Tobacco comments:     1/2 pack daily    Vaping Use     Vaping Use: Never used   Substance and Sexual Activity     Alcohol use: Yes     Comment: 5-6 daily     Drug use: No   Other Topics Concern     Parent/sibling w/ CABG, MI or angioplasty before 65F 55M? No       ROS:   General: No change in weight, sleep or appetite.  Normal energy.  No fever or chills  Eyes: + recent left retinal artery occlusion.   ENT: No problems with ears, nose or throat.  No difficulty swallowing.  Resp: No coughing, wheezing or shortness of breath  CV: No chest pains or palpitations  GI: No nausea, vomiting,  heartburn, abdominal pain, diarrhea, constipation or change in bowel habits  : No urinary frequency or dysuria, bladder or kidney problems  Musculoskeletal: No significant muscle or joint pains  Neurologic: No headaches, numbness, tingling, weakness, problems with balance or coordination  Psychiatric: No problems with anxiety, depression or mental health  Heme/immune/allergy: No history of bleeding or clotting problems or anemia.  No allergies or immune system problems  Endocrine: No history of thyroid disease, diabetes or other endocrine disorders  Skin: No rashes,worrisome lesions or skin problems  Vascular:  No claudication, lifestyle limiting or otherwise; no ischemic rest pain; no non-healing ulcers. No weakness, No loss of sensation.    EXAM:  /60 (BP Location: Right arm)   Pulse 67   Wt 178 lb 3.2 oz (80.8 kg)   SpO2 97%   BMI 27.10 kg/m    In general, the patient is a pleasant male in no apparent distress.    HEENT: NC/AT.  PERRLA.  EOMI.  Sclerae white, not injected. Dentition intact.    Neck: Carotids +2/2 bilaterally without bruits.   Heart: RRR. Normal S1, S2 splits physiologically. No murmur, rub, click, or gallop.  Lungs: CTA.  No ronchi, wheezes, rales.    Abdomen: Soft, nontender,  nondistended.  Extremities: No clubbing, cyanosis, or edema. He has weakly palpable PT pulses bilaterally. Audible multiphasic pulses in left DP and bilateral PT and weaker monophasic pulse right DP. Popliteal arteries without bounding pulse.  No wounds.   Neurologic: CN 2-12 intact with exception of visual defect on left eye.         Labs:  LIPID RESULTS:  Lab Results   Component Value Date    CHOL 186 06/20/2023    CHOL 195 05/24/2021    HDL 42 06/20/2023    HDL 37 (L) 05/24/2021    LDL  06/20/2023      Comment:      Cannot estimate LDL when triglyceride exceeds 400 mg/dL    LDL 86 06/20/2023    LDL 76 04/08/2022    LDL 82 05/24/2021    TRIG 503 (H) 06/20/2023    TRIG 378 (H) 05/24/2021       LIVER ENZYME RESULTS:  Lab Results   Component Value Date    AST 31 06/20/2023    AST 24 06/28/2021    ALT 26 06/20/2023    ALT 27 06/28/2021       CBC RESULTS:  Lab Results   Component Value Date    WBC 6.2 06/20/2023    WBC 6.9 06/28/2021    RBC 4.54 06/20/2023    RBC 3.68 (L) 06/28/2021    HGB 14.4 06/20/2023    HGB 11.9 (L) 06/28/2021    HCT 41.8 06/20/2023    HCT 34.4 (L) 06/28/2021    MCV 92 06/20/2023    MCV 94 06/28/2021    MCH 31.7 06/20/2023    MCH 32.3 06/28/2021    MCHC 34.4 06/20/2023    MCHC 34.6 06/28/2021    RDW 11.4 06/20/2023    RDW 12.1 06/28/2021     06/20/2023     06/28/2021       BMP RESULTS:  Lab Results   Component Value Date     06/20/2023     06/28/2021    POTASSIUM 4.6 06/20/2023    POTASSIUM 4.6 04/08/2022    POTASSIUM 4.2 06/28/2021    CHLORIDE 102 06/20/2023    CHLORIDE 106 04/08/2022    CHLORIDE 109 06/28/2021    CO2 26 06/20/2023    CO2 27 04/08/2022    CO2 21 06/28/2021    ANIONGAP 10 06/20/2023    ANIONGAP 5 04/08/2022    ANIONGAP 8 06/28/2021     (H) 06/20/2023     (H) 04/08/2022     (H) 06/28/2021    BUN 17.8 06/20/2023    BUN 17 04/08/2022    BUN 22 06/28/2021    CR 1.19 (H) 06/20/2023    CR 1.24 06/28/2021    GFRESTIMATED 64 06/20/2023     GFRESTIMATED 57 (L) 06/28/2021    GFRESTBLACK 67 06/28/2021    ROLDAN 10.4 (H) 06/20/2023    ROLDAN 9.3 06/28/2021        A1C RESULTS:  Lab Results   Component Value Date    A1C 5.7 (H) 04/08/2022    A1C 5.6 07/01/2019       THYROID RESULTS:  Lab Results   Component Value Date    TSH 2.47 07/01/2019       Procedures:   BILATERAL CAROTID ULTRASOUND 7/7/2023 3:43 PM      HISTORY: Unspecified visual disturbance     COMPARISON: None.     RIGHT CAROTID FINDINGS: There is mild mixed atherosclerotic plaque at  the carotid bifurcation and proximal internal carotid artery.  Right ICA PSV: 139 cm/sec.  Right ICA EDV: 36 cm/sec.  Right ICA/CCA PSV Ratio: 1.2   These indicate less than 50% diameter stenosis of the right ICA.   Right Vertebral: Antegrade flow.   Right ECA: Antegrade flow. Velocity is considerably elevated at 521  cm/second.     LEFT CAROTID FINDINGS: There is at least moderate atherosclerotic  plaque throughout the left carotid arterial systems, in some areas  difficult to visualize lumen.  Left ICA PSV: 204 cm/sec.  Left ICA EDV: 38 cm/sec.  Left ICA/CCA PSV Ratio: 4.4   These indicate 50 - 69% diameter stenosis of the left ICA.   Left Vertebral: Antegrade flow.   Left ECA: Antegrade flow.      Causes of Decreased Accuracy: Discrepant findings in the left carotid  arterial system with peak systolic and diastolic velocity suggestive  of 50-69% stenosis, though ratio is suggestive of greater than 70%  stenosis. In areas, lumen somewhat difficult to assess given degree of  atherosclerotic plaque.                                                                      IMPRESSION:   1. Less than 50% diameter stenosis of the right ICA relative to the  distal ICA diameter.   2. 50-69% diameter stenosis of the left ICA relative to the distal ICA  diameter, though, please see above description of discrepancy.  3. Significant velocity elevation in the right external carotid  Artery.      CT CHEST LUNG CANCER SCREEN LOW DOSE  WITHOUT  6/29/2023 10:10 AM     HISTORY: Lung cancer screening. No chest CT for lung cancer screening  in the last year; 50-80 years; >= 20 pack-year smoking history.  Current smoker. Personal history of nicotine dependence.     TECHNIQUE: Scans obtained from the apices through the diaphragm  without IV contrast. Low dose CT chest technique was utilized.  Radiation dose for this scan was reduced using automated exposure  control, adjustment of the mA and/or kV according to patient size, or  iterative reconstruction technique.     COMPARISON:  4/22/2022, 7/31/2020     FINDINGS:  Lungs: No pulmonary nodules or masses. No airspace consolidation or  pleural fluid. Central airways are patent.     Additional findings: Heart size is normal. No pericardial effusion.  Moderate to severe coronary artery atherosclerosis is present. Mild  thoracic aortic atherosclerosis is also noted. No enlarged thoracic  lymph nodes.     Partially imaged fusiform aneurysmal dilation of the infrarenal  abdominal aorta 3.3 cm. Colonic diverticulosis is present without  evidence of diverticulitis. No acute osseous abnormality. Small  suspected sebaceous cyst in the upper back to the right of midline.                                                                      IMPRESSION:   1. ACR Assessment Category:  Lung-RADS Category 1. Negative, continue  annual screening, if clinically relevant (please order exam code IMG  2290).   2. Significant Incidental Finding(s): Category S: Yes. Coronary artery  calcium moderate or severe.  3. Partially imaged fusiform aneurysmal dilation of the infrarenal  abdominal aorta.      [facility]  DATE: 6/29/2023  ULTRASOUND ABDOMINAL AORTA     INDICATION: Abdominal aortic aneurysm surveillance     COMPARISON: CT of the abdomen pelvis dated 6/5/2019, ultrasound of the  abdomen dated 4/22/2022     FINDINGS:  THE ABDOMINAL AORTA MEASURES:  Proximal aorta: 2.1 x 2.2 cm  Mid aorta: 2.0 x 1.9 cm  Distal aorta:  4.0 x 4.5 cm versus 3.6 x 4.0 cm on the prior exam  Right common iliac artery: 1.0 cm  Left common iliac artery: 0.8 cm                                                                      IMPRESSION:   1.  4.0 x 4.5 cm infrarenal abdominal aortic aneurysm. This has  increased in size.      Procedure  Complete Echo Adult 7/6/23  ______________________________________________________________________________  Interpretation Summary     Poor image quality.  Left ventricular systolic function is normal.  The visual ejection fraction is 55-60%.  No hemodynamically significant valvular abnormalities on 2D or color flow  imaging.      Assessment and Plan:   1. Symptomatic carotid stenosis with recent left retinal artery occlusion    -Diagnosed recently with left retinal artery occlusion at Ophthalmologist in [place].   -Carotid duplex with 50-69% left carotid artery stenosis, although may be higher given ratio.     Recommendations:   -Obtain CTA head/neck today.   -Will have him be seen by Vascular Surgery today after imaging to discuss results and potential surgical carotid endarterectomy if indicated by imaging results.   -Continue Aspirin.  -He must stop smoking.   -Control blood pressure (see below).     -He has no prior history of trouble with anesthesia.   -No history of sleep apnea.   -Plan is for left carotid endarterectomy on 7/13/23.   -Labs to be done at [facility] clinic tomorrow. He is cleared for the procedure as long as labs return ok. EKG to be done in pre-op.   -He is to take all of his usual medications the day of surgery with a small sip of water, including newly prescribed Plavix.       2. AAA, asymptomatic    -This is slowly enlarging in size, up to 4.0 x 4.5 cm from 3.6 x 4.0 cm the year prior.   -Continues to smoke.     Recommendations:   -Follow-up imaging in 6 months given growth of aneurysm over the past year.   -Continue aspirin and statin.   -Control blood pressure.  -Discussed how he must stop  smoking to prevent further growth of the aneurysm.       3. Hyperlipidemia/Hypetriglycerideima    -Triglycerides 503, LDL 86.   -Moderate to severe calcifications noted on imaging in arteries.  -Unable to tolerate fenofibrate in past (increased creatinine)    Recommendations:   -Atorvastatin was recently increased by PCP to 80 mg daily.   -Will switch from his over the counter fish oil 1 g daily to Lovaza 2 g BID.   -Repeat lipid panel in 3 months. Goal LDL is less than 70.       4. Hypertension    -BP elevated upon arrival to clinic but better upon recheck.   -On amlodipine 5 mg daily.   -Lisinopril, losartan and hydrochlorothiazide stopped in past due to NOEL/side effects. Also has history of gout.     Recommendations:   -Continue Amlodipine.   -Start Metoprolol 25 mg BID given markedly elevated blood pressure on arrival to clinic (170's/70's), carotid stenosis, and AAA.   -Prescription given for him to get a blood pressure cuff for home at a medical supply store. He should check his blood pressure and record values.   -Will follow-up with him in a month to recheck/review home blood pressure values.       5. Ongoing tobacco use    -Continues to smoke 1/2 ppd, but continuing to cut back.     Recommendations:   -He desires to quit. Will try doing this with his wife as she is also needing to quit.   -Discussed importance of quitting smoking. He politely declines any assistance with this right now.             MARY KAY Mcclendon-C      60 minutes spent on the date of the encounter doing chart review, history and exam, documentation, and further activities as noted above.

## 2023-07-11 NOTE — PROGRESS NOTES
[facility] VASCULAR [facility] CENTER INITIAL VASCULAR SURGERY CONSULT    Impression:  1.  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  He sees an ophthalmologist in [place], [place] and at the time of this encounter I am still trying to obtain the office notes from his recent 7/5/2023 visit when that diagnosis was rendered.       2.  Tandem severe stenosis of the proximal supraclinoid intracranial distal left internal carotid.    3.  Tobacco abuse.    4.  4.5 cm infrarenal abdominal aortic aneurysm.    5.  Metabolic syndrome    Plan:   I had a lengthy discussion with Mr. Baig regarding basic carotid pathology and the natural history of a symptomatic carotid stenosis.  We reviewed the [employer] Symptomatic Carotid Endarterectomy Trial.  I recommend expedited left carotid endarterectomy with bovine pericardial patch angioplasty.  I will add Plavix 75 mg p.o. daily to his current regimen of aspirin 325 mg p.o. daily.  He understands that his current visual field deficit is likely permanent.  Left carotid endarterectomy is tentatively scheduled here at [facility] for Thursday, 7/13/2023.    All of his questions were answered and he verbalizes full understanding to the above and complete agreement with this management plan.   He plans to immediately quit smoking.    Total length of this encounter was 60 minutes with time spent reviewing studies, interviewing and examining the patient, answering questions, and developing a treatment plan.      HPI:  Dash Baig is a 72-year-old right-handed gentleman who noted some left eye loss of vision on 7/1/2023.  He presented to a local ophthalmologist on 7/5/2023 who noted branch retinal artery occlusion of the left eye.  He was referred today for vascular surgical consultation.  He denied associated speech or motor disturbances.  There has been no further deterioration  of his left eye vision.  He was already taking aspirin 81 mg daily and this was increased to 325 mg daily.    He is a retired factory .  He remains physically active.  He denies a prior history of myocardial infarction or previous stroke.  He has been a light smoker throughout his life.          CURRENT MEDICATIONS  amLODIPine (NORVASC) 5 MG tablet, Take 1 tablet (5 mg) by mouth daily  aspirin (ASA) 325 MG tablet, Take 1 tablet (325 mg) by mouth daily  atorvastatin (LIPITOR) 40 MG tablet, Take 2 tablets (80 mg) by mouth daily  blood glucose monitoring (NO BRAND SPECIFIED) meter device kit, 1 kit by In Vitro route 2 times daily Use to test blood sugar 2 times daily or as directed.  blood glucose monitoring (NO BRAND SPECIFIED) test strip, Use to test blood sugars 2 times daily or as directed  Cholecalciferol (VITAMIN D3) 2000 units CAPS, Take 1 capsule by mouth every morning   Cyanocobalamin (B-12) 1000 MCG CAPS, Take 1 capsule by mouth  indomethacin (INDOCIN) 25 MG capsule, TAKE 1 CAPSULE (25 MG) BY MOUTH 2 TIMES DAILY (WITH MEALS)**NOT COVERED**  metoprolol tartrate (LOPRESSOR) 25 MG tablet, Take 1 tablet (25 mg) by mouth 2 times daily  omega-3 acid ethyl esters (LOVAZA) 1 g capsule, Take 2 capsules (2 g) by mouth 2 times daily    [COMPLETED] iopamidol (ISOVUE-370) solution 75 mL  [COMPLETED] Saline flush          PAST MEDICAL HISTORY  No past medical history on file.      PAST SURGICAL HISTORY:  Past Surgical History:   Procedure Laterality Date     COLONOSCOPY N/A 11/9/2017    Procedure: COLONOSCOPY;  colonoscopy;  Surgeon: Justice Horner MD;  Location: [facility]       ALLERGIES   No Known Allergies    FAMILY HISTORY  Family History   Problem Relation Age of Onset     Other Cancer Mother         lung     Heart Failure Father      Lung Cancer Sister        SOCIAL HISTORY  Social History     Tobacco Use     Smoking status: Every Day     Packs/day: 0.50     Years: 30.00     Pack years: 15.00      "Types: Cigarettes     Smokeless tobacco: Never     Tobacco comments:     1/2 pack daily    Vaping Use     Vaping Use: Never used   Substance Use Topics     Alcohol use: Yes     Comment: 5-6 daily     Drug use: No       ROS:   Review of Systems   Constitutional: Negative.   HENT: Negative.    Eyes: Positive for vision loss in left eye.   Cardiovascular: Negative.    Respiratory: Negative.    Endocrine: Negative.    Hematologic/Lymphatic: Negative.    Skin: Negative.    Musculoskeletal: Negative.    Gastrointestinal: Negative.    Genitourinary: Negative.    Neurological: Negative.    Psychiatric/Behavioral: Negative.    Allergic/Immunologic: Negative.          EXAM:  BP (!) 179/83 (BP Location: Left arm, Patient Position: Chair, Cuff Size: Adult Regular)   Pulse 61   Ht 5' 8\" (1.727 m)   Wt 178 lb (80.7 kg)   SpO2 98%   BMI 27.06 kg/m    Physical Exam  Vitals reviewed.   Constitutional:       Appearance: Normal appearance.   HENT:      Head: Normocephalic and atraumatic.   Eyes:      General: No scleral icterus.     Extraocular Movements: Extraocular movements intact.      Pupils: Pupils are equal, round, and reactive to light.   Cardiovascular:      Rate and Rhythm: Normal rate and regular rhythm.      Pulses:           Radial pulses are 2+ on the right side and 2+ on the left side.   Musculoskeletal:         General: Normal range of motion.      Cervical back: Normal range of motion. No rigidity.   Skin:     General: Skin is warm and dry.   Neurological:      General: No focal deficit present.      Mental Status: He is alert and oriented to person, place, and time. Mental status is at baseline.   Psychiatric:         Mood and Affect: Mood normal.         Behavior: Behavior normal.         Thought Content: Thought content normal.         Judgment: Judgment normal.         Labs:  LIPID RESULTS:  Lab Results   Component Value Date    CHOL 186 06/20/2023    CHOL 195 05/24/2021    HDL 42 06/20/2023    HDL 37 (L) " 05/24/2021    LDL  06/20/2023      Comment:      Cannot estimate LDL when triglyceride exceeds 400 mg/dL    LDL 86 06/20/2023    LDL 76 04/08/2022    LDL 82 05/24/2021    TRIG 503 (H) 06/20/2023    TRIG 378 (H) 05/24/2021       CBC RESULTS:  Lab Results   Component Value Date    WBC 6.2 06/20/2023    WBC 6.9 06/28/2021    RBC 4.54 06/20/2023    RBC 3.68 (L) 06/28/2021    HGB 14.4 06/20/2023    HGB 11.9 (L) 06/28/2021    HCT 41.8 06/20/2023    HCT 34.4 (L) 06/28/2021    MCV 92 06/20/2023    MCV 94 06/28/2021    MCH 31.7 06/20/2023    MCH 32.3 06/28/2021    MCHC 34.4 06/20/2023    MCHC 34.6 06/28/2021    RDW 11.4 06/20/2023    RDW 12.1 06/28/2021     06/20/2023     06/28/2021       BMP RESULTS:  Lab Results   Component Value Date     06/20/2023     06/28/2021    POTASSIUM 4.6 06/20/2023    POTASSIUM 4.6 04/08/2022    POTASSIUM 4.2 06/28/2021    CHLORIDE 102 06/20/2023    CHLORIDE 106 04/08/2022    CHLORIDE 109 06/28/2021    CO2 26 06/20/2023    CO2 27 04/08/2022    CO2 21 06/28/2021    ANIONGAP 10 06/20/2023    ANIONGAP 5 04/08/2022    ANIONGAP 8 06/28/2021     (H) 06/20/2023     (H) 04/08/2022     (H) 06/28/2021    BUN 17.8 06/20/2023    BUN 17 04/08/2022    BUN 22 06/28/2021    CR 1.19 (H) 06/20/2023    CR 1.24 06/28/2021    GFRESTIMATED 64 06/20/2023    GFRESTIMATED 57 (L) 06/28/2021    GFRESTBLACK 67 06/28/2021    ROLDAN 10.4 (H) 06/20/2023    ROLDAN 9.3 06/28/2021        A1C RESULTS:  Lab Results   Component Value Date    A1C 5.7 (H) 04/08/2022    A1C 5.6 07/01/2019         Imaging:    BILATERAL CAROTID ULTRASOUND 7/7/2023 3:43 PM      HISTORY: Unspecified visual disturbance     COMPARISON: None.     RIGHT CAROTID FINDINGS: There is mild mixed atherosclerotic plaque at  the carotid bifurcation and proximal internal carotid artery.  Right ICA PSV: 139 cm/sec.  Right ICA EDV: 36 cm/sec.  Right ICA/CCA PSV Ratio: 1.2   These indicate less than 50% diameter stenosis of the  right ICA.   Right Vertebral: Antegrade flow.   Right ECA: Antegrade flow. Velocity is considerably elevated at 521  cm/second.     LEFT CAROTID FINDINGS: There is at least moderate atherosclerotic  plaque throughout the left carotid arterial systems, in some areas  difficult to visualize lumen.  Left ICA PSV: 204 cm/sec.  Left ICA EDV: 38 cm/sec.  Left ICA/CCA PSV Ratio: 4.4   These indicate 50 - 69% diameter stenosis of the left ICA.   Left Vertebral: Antegrade flow.   Left ECA: Antegrade flow.      Causes of Decreased Accuracy: Discrepant findings in the left carotid  arterial system with peak systolic and diastolic velocity suggestive  of 50-69% stenosis, though ratio is suggestive of greater than 70%  stenosis. In areas, lumen somewhat difficult to assess given degree of  atherosclerotic plaque.                                                                      IMPRESSION:   1. Less than 50% diameter stenosis of the right ICA relative to the  distal ICA diameter.   2. 50-69% diameter stenosis of the left ICA relative to the distal ICA  diameter, though, please see above description of discrepancy.  3. Significant velocity elevation in the right external carotid  artery.     MARKELL APARICIO MD          Recent Results (from the past 24 hour(s))   CTA Head Neck with Contrast    Narrative    CT ANGIOGRAM OF THE HEAD AND NECK WITHOUT AND WITH CONTRAST  7/11/2023  2:31 PM     COMPARISON: Carotid Doppler ultrasound 7/7/2023    HISTORY: Carotid stenosis with left retinal artery occlusion.  Bilateral carotid artery stenosis.    TECHNIQUE:  Precontrast localizing scans were followed by CT  angiography with an injection of 75mL ISOVUE-370 nonionic intravenous  contrast material with scans through the head and neck.  Images were  transferred to a separate 3-D workstation where multiplanar  reformations and 3-D images were created.  Estimates of carotid  stenoses are made relative to the distal internal carotid  "artery  diameters except as noted.      FINDINGS:   Neck CTA: The right common carotid artery is patent without stenosis.  There is calcified and noncalcified plaque at the origin of the right  internal carotid artery that results in mild narrowing (less than 50%  luminal diameter stenosis). The left common carotid artery  demonstrates severe stenosis at the bifurcation, but is otherwise  patent and unremarkable. This severe stenosis results in severe  narrowing at the origin of the left internal carotid artery (likely  greater than 90% luminal diameter stenosis). The vertebral arteries  bilaterally are patent without stenosis.    Head CTA: There is severe atherosclerotic narrowing of the proximal  supraclinoid left internal carotid artery. There is plaque without  stenosis of the intracranial distal right internal carotid artery. The  basilar, bilateral anterior cerebral, bilateral middle cerebral and  bilateral posterior cerebral arteries are patent and unremarkable.      Impression    IMPRESSION:  1. Severe stenosis (likely greater than 90% luminal diameter stenosis)  at the distal left common carotid/proximal left internal carotid  artery.  2. Mild atherosclerotic narrowing (less than 50% luminal diameter  stenosis) at the origin of the right internal carotid artery.  3. Severe stenosis of the proximal supraclinoid intracranial distal  left internal carotid artery.  4. Otherwise, normal neck and head CTA.      Radiation dose for this scan was reduced using automated exposure  control, adjustment of the mA and/or kV according to patient size, or  iterative reconstruction technique    MELODY BAINS MD         SYSTEM ID:  [id number]             Enzo Amanda MD    Patient is here to discuss consult    BP (!) 161/77 (BP Location: Left arm, Patient Position: Chair, Cuff Size: Adult Regular)   Pulse 67   Ht 5' 8\" (1.727 m)   Wt 178 lb (80.7 kg)   SpO2 97%   BMI 27.06 kg/m      Questions patient would like " addressed today are: N/A.    Refills are needed: No    Has homecare services and agency name:  Agustina MATTHEWS

## 2023-07-11 NOTE — PATIENT INSTRUCTIONS
Get a CTA of your head/neck today.     2. See Vascular Surgery (Dr. Amanda) to review imaging and consider possible carotid endarterectomy.     3.  Start taking Lovaza (prescription fish oil). Discontinue your fish oil at home while taking this.     4. Continue Atorvastatin 80 mg daily. Repeat your cholesterol lab in 3 months.     5. Get a blood pressure cuff and monitor your blood pressure at home. Your goal blood pressure should be less than 130/80, ideally, less than 120/80 given your aneurysm. Write these values down and bring them to your next appointment. Call us if your blood pressure is continuously elevated.    6. Start Metoprolol for your blood pressure as well as to help with expansion of the aortic aneurysm and with known carotid stenosis.     7.  Stop smoking. If you continue to smoke, your aneurysm will continue to expand.     8. Follow-up with Vascular Medicine in about 30 days to recheck blood pressure. This can be virtual if you get a blood pressure cuff for home.     9. Call us with any questions or concerns ([phone]).

## 2023-07-11 NOTE — TELEPHONE ENCOUNTER
[facility] VASCULAR [facility] CENTER    Who is the name of the provider?:  MARY KAY Sanderson/   What is the location you see this provider at/preferred location?:[place]  Person calling / Facility: [facility]  Phone number:  [phone]  Nurse call back needed:   if no records arrived    Reason for call:  Junie [facility] she received a call from  Pearl Sanderson about wanting records for this patient, she was making sure  Pearl received them     Pharmacy location:  na  Outside Imaging: na  Can we leave a detailed message on this number?  no

## 2023-07-11 NOTE — TELEPHONE ENCOUNTER
CASE REQUEST RECEIVED ON 7/11/23 FOR: LEFT CAROTID ENDARTERECTOMY WITH NASAL INTUBATION AND EEG    CASE ID: [id number]

## 2023-07-11 NOTE — NURSING NOTE
Patient Education    Procedure: LEFT CAROTID ENDARTERECTOMY WITH NASAL INTUBATION AND EEG.   Diagnosis: SYMPTOMATIC LEFT CAROTID STENOSIS (LEFT CENTRAL RETINAL ARTERY OCCLUSION).   Anticoagulation Instruction: CONTINUE PLAVIX AND ASPIRIN. PLAVIX WAS NEWLY PRESCRIBED TODAY, 75MG ONCE DAILY. PT TO  AT [facility] AFTER OV.   Pre-Operative Physical Exam: You need to have a pre-op physical exam within 30 days of your procedure. Your procedure may be cancelled if you do not have a current History and Physical. Call your PCP's office to schedule.  Allergies:  Updated in Epic  Bowel Prep: N/A  NPO for solid 8 hours prior to arrival time.   NPO for clear liquids 2 hours prior to arrival time.   Post Procedure Education: [facility] patient post-procedure fact sheet reviewed with patient.    Showering instructions reviewed: Yes    Learner(s):patient  Method: Listening  Barriers to Learning:No Barrier  Outcome: Patient did verbalize understanding of above education.    Case request completed and physician order pended.     MICHAEL Haque, RN  [facility]  Office:  [phone] Fax: [phone]

## 2023-07-12 ENCOUNTER — LAB (OUTPATIENT)
Dept: LAB | Facility: CLINIC | Age: 73
End: 2023-07-12
Payer: COMMERCIAL

## 2023-07-12 ENCOUNTER — TELEPHONE (OUTPATIENT)
Dept: OTHER | Facility: CLINIC | Age: 73
End: 2023-07-12

## 2023-07-12 ENCOUNTER — ANESTHESIA EVENT (OUTPATIENT)
Dept: SURGERY | Facility: CLINIC | Age: 73
DRG: 038 | End: 2023-07-12
Payer: COMMERCIAL

## 2023-07-12 DIAGNOSIS — N18.30 STAGE 3 CHRONIC KIDNEY DISEASE, UNSPECIFIED WHETHER STAGE 3A OR 3B CKD (H): ICD-10-CM

## 2023-07-12 DIAGNOSIS — I65.23 BILATERAL CAROTID ARTERY STENOSIS: ICD-10-CM

## 2023-07-12 DIAGNOSIS — I10 BENIGN ESSENTIAL HYPERTENSION: Primary | ICD-10-CM

## 2023-07-12 LAB
ANION GAP SERPL CALCULATED.3IONS-SCNC: 9 MMOL/L (ref 7–15)
APTT PPP: 29 SECONDS (ref 22–38)
BASOPHILS # BLD AUTO: 0 10E3/UL (ref 0–0.2)
BASOPHILS NFR BLD AUTO: 0 %
BUN SERPL-MCNC: 18.2 MG/DL (ref 8–23)
CALCIUM SERPL-MCNC: 10.7 MG/DL (ref 8.8–10.2)
CHLORIDE SERPL-SCNC: 103 MMOL/L (ref 98–107)
CREAT SERPL-MCNC: 1.1 MG/DL (ref 0.67–1.17)
DEPRECATED HCO3 PLAS-SCNC: 24 MMOL/L (ref 22–29)
EOSINOPHIL # BLD AUTO: 0.3 10E3/UL (ref 0–0.7)
EOSINOPHIL NFR BLD AUTO: 4 %
ERYTHROCYTE [DISTWIDTH] IN BLOOD BY AUTOMATED COUNT: 11.5 % (ref 10–15)
GFR SERPL CREATININE-BSD FRML MDRD: 70 ML/MIN/1.73M2
GLUCOSE SERPL-MCNC: 105 MG/DL (ref 70–99)
HCT VFR BLD AUTO: 40.9 % (ref 40–53)
HGB BLD-MCNC: 14 G/DL (ref 13.3–17.7)
IMM GRANULOCYTES # BLD: 0 10E3/UL
IMM GRANULOCYTES NFR BLD: 0 %
INR PPP: 1.04 (ref 0.85–1.15)
LYMPHOCYTES # BLD AUTO: 2.3 10E3/UL (ref 0.8–5.3)
LYMPHOCYTES NFR BLD AUTO: 25 %
MCH RBC QN AUTO: 32 PG (ref 26.5–33)
MCHC RBC AUTO-ENTMCNC: 34.2 G/DL (ref 31.5–36.5)
MCV RBC AUTO: 94 FL (ref 78–100)
MONOCYTES # BLD AUTO: 0.9 10E3/UL (ref 0–1.3)
MONOCYTES NFR BLD AUTO: 10 %
NEUTROPHILS # BLD AUTO: 5.4 10E3/UL (ref 1.6–8.3)
NEUTROPHILS NFR BLD AUTO: 61 %
PLATELET # BLD AUTO: 272 10E3/UL (ref 150–450)
POTASSIUM SERPL-SCNC: 4.4 MMOL/L (ref 3.4–5.3)
PTH-INTACT SERPL-MCNC: 47 PG/ML (ref 15–65)
RBC # BLD AUTO: 4.37 10E6/UL (ref 4.4–5.9)
SODIUM SERPL-SCNC: 136 MMOL/L (ref 136–145)
WBC # BLD AUTO: 8.9 10E3/UL (ref 4–11)

## 2023-07-12 PROCEDURE — 36415 COLL VENOUS BLD VENIPUNCTURE: CPT

## 2023-07-12 PROCEDURE — 85610 PROTHROMBIN TIME: CPT

## 2023-07-12 PROCEDURE — 80048 BASIC METABOLIC PNL TOTAL CA: CPT

## 2023-07-12 PROCEDURE — 85730 THROMBOPLASTIN TIME PARTIAL: CPT

## 2023-07-12 PROCEDURE — 85025 COMPLETE CBC W/AUTO DIFF WBC: CPT

## 2023-07-12 PROCEDURE — 83970 ASSAY OF PARATHORMONE: CPT

## 2023-07-12 RX ORDER — INDOMETHACIN 25 MG/1
1 CAPSULE ORAL PRN
COMMUNITY
End: 2024-06-21

## 2023-07-12 NOTE — TELEPHONE ENCOUNTER
Patient needs to be scheduled for in-person 1 month follow up with Pearl Sanderson for blood pressure check.    Appt note: 1 month follow up to 7/11/223 for blood pressure check.     Farida RODRIGUEZ, RN    [facility]  Office: [phone]  Fax: [phone]

## 2023-07-12 NOTE — TELEPHONE ENCOUNTER
7/11/23  Informed patient while he was in the office that his surgery is scheduled on Thursday, 7/13/23 @ 11:55am with a check-in time of 9:55am at [facility] Desk.    Patient is scheduled for a post-op appointment on Friday, 7/28/23 with Dr. Amanda.

## 2023-07-12 NOTE — PROGRESS NOTES
PTA medications updated by Medication Scribe prior to surgery via phone call with patient (last doses completed by Nurse)     Medication history sources: Patient, Surescripts and H&P  In the past week, patient estimated taking medication this percent of the time: Greater than 90%      Significant changes made to the medication list:  None      Additional medication history information:   None    Medication reconciliation completed by provider prior to medication history? No    Time spent in this activity: 25 minutes    The information provided in this note is only as accurate as the sources available at the time of update(s)    Prior to Admission medications    Medication Sig Last Dose Taking? Auth Provider Long Term End Date   amLODIPine (NORVASC) 5 MG tablet Take 1 tablet (5 mg) by mouth daily  at AM Yes Charisse Lopez MD Yes    aspirin (ASA) 325 MG tablet Take 1 tablet (325 mg) by mouth daily  at AM Yes Charisse Lopez MD     atorvastatin (LIPITOR) 40 MG tablet Take 2 tablets (80 mg) by mouth daily  at AM Yes Charisse Lopez MD Yes    Cholecalciferol (VITAMIN D3) 2000 units CAPS Take 1 capsule by mouth every morning  7/12/2023 at AM Yes Reported, Patient     clopidogrel (PLAVIX) 75 MG tablet Take 1 tablet (75 mg) by mouth daily  at AM Yes Enzo Amanda MD Yes    Cyanocobalamin (B-12) 1000 MCG CAPS Take 1 capsule by mouth daily 7/12/2023 at AM Yes Reported, Patient     indomethacin (INDOCIN) 25 MG capsule Take 1 capsule by mouth as needed for moderate pain (for gout flare ups) Unknown at PRN Yes Reported, Patient No    metoprolol tartrate (LOPRESSOR) 25 MG tablet Take 1 tablet (25 mg) by mouth 2 times daily  at AM Yes Pearl Sanderson PA-C Yes    omega-3 acid ethyl esters (LOVAZA) 1 g capsule Take 2 capsules (2 g) by mouth 2 times daily 7/12/2023 at AM Yes Pearl Sanderson PA-C     blood glucose monitoring (NO BRAND SPECIFIED) meter device kit 1 kit by In Vitro route 2 times daily  Use to test blood sugar 2 times daily or as directed.   Charisse Lopez MD     blood glucose monitoring (NO BRAND SPECIFIED) test strip Use to test blood sugars 2 times daily or as directed   Charisse Lopez MD       Medication history completed by:    Anson Bolton CPhT  Medication [facility]

## 2023-07-13 ENCOUNTER — HOSPITAL ENCOUNTER (INPATIENT)
Facility: CLINIC | Age: 73
LOS: 1 days | Discharge: HOME | DRG: 038 | End: 2023-07-14
Attending: SURGERY | Admitting: SURGERY
Payer: COMMERCIAL

## 2023-07-13 ENCOUNTER — ANESTHESIA (OUTPATIENT)
Dept: SURGERY | Facility: CLINIC | Age: 73
DRG: 038 | End: 2023-07-13
Payer: COMMERCIAL

## 2023-07-13 ENCOUNTER — APPOINTMENT (OUTPATIENT)
Dept: SURGERY | Facility: PHYSICIAN GROUP | Age: 73
End: 2023-07-13
Payer: COMMERCIAL

## 2023-07-13 DIAGNOSIS — Z79.2 NEED FOR PROPHYLACTIC ANTIBIOTIC: Primary | ICD-10-CM

## 2023-07-13 DIAGNOSIS — I65.29 SYMPTOMATIC CAROTID ARTERY STENOSIS WITHOUT INFARCTION, UNSPECIFIED LATERALITY: ICD-10-CM

## 2023-07-13 LAB
ABO/RH(D): NORMAL
ANTIBODY SCREEN: NEGATIVE
CHOLEST SERPL-MCNC: 169 MG/DL
GLUCOSE SERPL-MCNC: 119 MG/DL (ref 70–99)
HBA1C MFR BLD: 5.9 %
HDLC SERPL-MCNC: 39 MG/DL
LDLC SERPL CALC-MCNC: 61 MG/DL
NONHDLC SERPL-MCNC: 130 MG/DL
SPECIMEN EXPIRATION DATE: NORMAL
TRIGL SERPL-MCNC: 343 MG/DL

## 2023-07-13 PROCEDURE — 250N000013 HC RX MED GY IP 250 OP 250 PS 637: Performed by: STUDENT IN AN ORGANIZED HEALTH CARE EDUCATION/TRAINING PROGRAM

## 2023-07-13 PROCEDURE — 250N000011 HC RX IP 250 OP 636: Performed by: SURGERY

## 2023-07-13 PROCEDURE — 250N000011 HC RX IP 250 OP 636: Mod: JZ | Performed by: ANESTHESIOLOGY

## 2023-07-13 PROCEDURE — 258N000003 HC RX IP 258 OP 636: Performed by: SURGERY

## 2023-07-13 PROCEDURE — 922N000001 HC NEURO MONITORING SERVICE, UP TO 7 HOURS (T1FEE): Performed by: SURGERY

## 2023-07-13 PROCEDURE — 272N000282 HC OR IOM SUPPLIES OPNP: Performed by: SURGERY

## 2023-07-13 PROCEDURE — 03UN0KZ SUPPLEMENT LEFT EXTERNAL CAROTID ARTERY WITH NONAUTOLOGOUS TISSUE SUBSTITUTE, OPEN APPROACH: ICD-10-PCS | Performed by: SURGERY

## 2023-07-13 PROCEDURE — 999N000141 HC STATISTIC PRE-PROCEDURE NURSING ASSESSMENT: Performed by: SURGERY

## 2023-07-13 PROCEDURE — 250N000013 HC RX MED GY IP 250 OP 250 PS 637: Performed by: SURGERY

## 2023-07-13 PROCEDURE — 258N000003 HC RX IP 258 OP 636: Performed by: ANESTHESIOLOGY

## 2023-07-13 PROCEDURE — 93005 ELECTROCARDIOGRAM TRACING: CPT

## 2023-07-13 PROCEDURE — 250N000011 HC RX IP 250 OP 636: Performed by: ANESTHESIOLOGY

## 2023-07-13 PROCEDURE — 360N000077 HC SURGERY LEVEL 4, PER MIN: Performed by: SURGERY

## 2023-07-13 PROCEDURE — 80061 LIPID PANEL: CPT | Performed by: SURGERY

## 2023-07-13 PROCEDURE — 250N000009 HC RX 250: Performed by: SURGERY

## 2023-07-13 PROCEDURE — 250N000011 HC RX IP 250 OP 636: Mod: JZ | Performed by: NURSE ANESTHETIST, CERTIFIED REGISTERED

## 2023-07-13 PROCEDURE — 36415 COLL VENOUS BLD VENIPUNCTURE: CPT | Performed by: SURGERY

## 2023-07-13 PROCEDURE — 272N000001 HC OR GENERAL SUPPLY STERILE: Performed by: SURGERY

## 2023-07-13 PROCEDURE — 710N000009 HC RECOVERY PHASE 1, LEVEL 1, PER MIN: Performed by: SURGERY

## 2023-07-13 PROCEDURE — 250N000009 HC RX 250: Performed by: ANESTHESIOLOGY

## 2023-07-13 PROCEDURE — 250N000009 HC RX 250: Performed by: NURSE ANESTHETIST, CERTIFIED REGISTERED

## 2023-07-13 PROCEDURE — 250N000025 HC SEVOFLURANE, PER MIN: Performed by: SURGERY

## 2023-07-13 PROCEDURE — 86850 RBC ANTIBODY SCREEN: CPT | Performed by: SURGERY

## 2023-07-13 PROCEDURE — 03CN0ZZ EXTIRPATION OF MATTER FROM LEFT EXTERNAL CAROTID ARTERY, OPEN APPROACH: ICD-10-PCS | Performed by: SURGERY

## 2023-07-13 PROCEDURE — 35301 RECHANNELING OF ARTERY: CPT | Performed by: SURGERY

## 2023-07-13 PROCEDURE — 83036 HEMOGLOBIN GLYCOSYLATED A1C: CPT | Performed by: SURGERY

## 2023-07-13 PROCEDURE — 120N000013 HC R&B IMCU

## 2023-07-13 PROCEDURE — 86901 BLOOD TYPING SEROLOGIC RH(D): CPT | Performed by: SURGERY

## 2023-07-13 PROCEDURE — C1763 CONN TISS, NON-HUMAN: HCPCS | Performed by: SURGERY

## 2023-07-13 PROCEDURE — 82947 ASSAY GLUCOSE BLOOD QUANT: CPT | Performed by: SURGERY

## 2023-07-13 PROCEDURE — 370N000017 HC ANESTHESIA TECHNICAL FEE, PER MIN: Performed by: SURGERY

## 2023-07-13 DEVICE — IMP PATCH PERICARDIUM PHOTOFIX BOVINE 0.8X8CM PFP0.8X8: Type: IMPLANTABLE DEVICE | Site: CAROTID | Status: FUNCTIONAL

## 2023-07-13 RX ORDER — HYDROMORPHONE HCL IN WATER/PF 6 MG/30 ML
0.2 PATIENT CONTROLLED ANALGESIA SYRINGE INTRAVENOUS EVERY 5 MIN PRN
Status: DISCONTINUED | OUTPATIENT
Start: 2023-07-13 | End: 2023-07-13 | Stop reason: HOSPADM

## 2023-07-13 RX ORDER — NALOXONE HYDROCHLORIDE 0.4 MG/ML
0.2 INJECTION, SOLUTION INTRAMUSCULAR; INTRAVENOUS; SUBCUTANEOUS
Status: DISCONTINUED | OUTPATIENT
Start: 2023-07-13 | End: 2023-07-14 | Stop reason: HOSPADM

## 2023-07-13 RX ORDER — LABETALOL HYDROCHLORIDE 5 MG/ML
10 INJECTION, SOLUTION INTRAVENOUS EVERY 10 MIN PRN
Status: DISCONTINUED | OUTPATIENT
Start: 2023-07-13 | End: 2023-07-14 | Stop reason: HOSPADM

## 2023-07-13 RX ORDER — EPHEDRINE SULFATE 50 MG/ML
INJECTION, SOLUTION INTRAMUSCULAR; INTRAVENOUS; SUBCUTANEOUS PRN
Status: DISCONTINUED | OUTPATIENT
Start: 2023-07-13 | End: 2023-07-13

## 2023-07-13 RX ORDER — FENTANYL CITRATE 50 UG/ML
50 INJECTION, SOLUTION INTRAMUSCULAR; INTRAVENOUS EVERY 5 MIN PRN
Status: DISCONTINUED | OUTPATIENT
Start: 2023-07-13 | End: 2023-07-13 | Stop reason: HOSPADM

## 2023-07-13 RX ORDER — GLYCOPYRROLATE 0.2 MG/ML
INJECTION, SOLUTION INTRAMUSCULAR; INTRAVENOUS PRN
Status: DISCONTINUED | OUTPATIENT
Start: 2023-07-13 | End: 2023-07-13

## 2023-07-13 RX ORDER — PROCHLORPERAZINE MALEATE 5 MG
5 TABLET ORAL EVERY 6 HOURS PRN
Status: DISCONTINUED | OUTPATIENT
Start: 2023-07-13 | End: 2023-07-14 | Stop reason: HOSPADM

## 2023-07-13 RX ORDER — ONDANSETRON 4 MG/1
4 TABLET, ORALLY DISINTEGRATING ORAL EVERY 30 MIN PRN
Status: DISCONTINUED | OUTPATIENT
Start: 2023-07-13 | End: 2023-07-13 | Stop reason: HOSPADM

## 2023-07-13 RX ORDER — SODIUM CHLORIDE, SODIUM LACTATE, POTASSIUM CHLORIDE, CALCIUM CHLORIDE 600; 310; 30; 20 MG/100ML; MG/100ML; MG/100ML; MG/100ML
INJECTION, SOLUTION INTRAVENOUS CONTINUOUS PRN
Status: DISCONTINUED | OUTPATIENT
Start: 2023-07-13 | End: 2023-07-13

## 2023-07-13 RX ORDER — HEPARIN SODIUM 1000 [USP'U]/ML
INJECTION, SOLUTION INTRAVENOUS; SUBCUTANEOUS PRN
Status: DISCONTINUED | OUTPATIENT
Start: 2023-07-13 | End: 2023-07-13

## 2023-07-13 RX ORDER — FENTANYL CITRATE 50 UG/ML
25 INJECTION, SOLUTION INTRAMUSCULAR; INTRAVENOUS EVERY 5 MIN PRN
Status: DISCONTINUED | OUTPATIENT
Start: 2023-07-13 | End: 2023-07-13 | Stop reason: HOSPADM

## 2023-07-13 RX ORDER — HYDROMORPHONE HCL IN WATER/PF 6 MG/30 ML
0.2 PATIENT CONTROLLED ANALGESIA SYRINGE INTRAVENOUS
Status: DISCONTINUED | OUTPATIENT
Start: 2023-07-13 | End: 2023-07-14 | Stop reason: HOSPADM

## 2023-07-13 RX ORDER — LIDOCAINE HYDROCHLORIDE 10 MG/ML
INJECTION, SOLUTION INFILTRATION; PERINEURAL PRN
Status: DISCONTINUED | OUTPATIENT
Start: 2023-07-13 | End: 2023-07-13 | Stop reason: HOSPADM

## 2023-07-13 RX ORDER — HYDROMORPHONE HCL IN WATER/PF 6 MG/30 ML
0.4 PATIENT CONTROLLED ANALGESIA SYRINGE INTRAVENOUS
Status: DISCONTINUED | OUTPATIENT
Start: 2023-07-13 | End: 2023-07-14 | Stop reason: HOSPADM

## 2023-07-13 RX ORDER — PROTAMINE SULFATE 10 MG/ML
INJECTION, SOLUTION INTRAVENOUS PRN
Status: DISCONTINUED | OUTPATIENT
Start: 2023-07-13 | End: 2023-07-13

## 2023-07-13 RX ORDER — CEFAZOLIN SODIUM/WATER 2 G/20 ML
2 SYRINGE (ML) INTRAVENOUS
Status: COMPLETED | OUTPATIENT
Start: 2023-07-13 | End: 2023-07-13

## 2023-07-13 RX ORDER — ACETAMINOPHEN 325 MG/1
975 TABLET ORAL ONCE
Status: COMPLETED | OUTPATIENT
Start: 2023-07-13 | End: 2023-07-13

## 2023-07-13 RX ORDER — ONDANSETRON 2 MG/ML
INJECTION INTRAMUSCULAR; INTRAVENOUS PRN
Status: DISCONTINUED | OUTPATIENT
Start: 2023-07-13 | End: 2023-07-13

## 2023-07-13 RX ORDER — AMOXICILLIN 250 MG
1 CAPSULE ORAL 2 TIMES DAILY
Status: DISCONTINUED | OUTPATIENT
Start: 2023-07-13 | End: 2023-07-14 | Stop reason: HOSPADM

## 2023-07-13 RX ORDER — DEXAMETHASONE SODIUM PHOSPHATE 4 MG/ML
INJECTION, SOLUTION INTRA-ARTICULAR; INTRALESIONAL; INTRAMUSCULAR; INTRAVENOUS; SOFT TISSUE PRN
Status: DISCONTINUED | OUTPATIENT
Start: 2023-07-13 | End: 2023-07-13

## 2023-07-13 RX ORDER — ASPIRIN 81 MG/1
162 TABLET, CHEWABLE ORAL
Status: COMPLETED | OUTPATIENT
Start: 2023-07-13 | End: 2023-07-13

## 2023-07-13 RX ORDER — SODIUM CHLORIDE, SODIUM LACTATE, POTASSIUM CHLORIDE, CALCIUM CHLORIDE 600; 310; 30; 20 MG/100ML; MG/100ML; MG/100ML; MG/100ML
INJECTION, SOLUTION INTRAVENOUS CONTINUOUS
Status: DISCONTINUED | OUTPATIENT
Start: 2023-07-13 | End: 2023-07-13 | Stop reason: HOSPADM

## 2023-07-13 RX ORDER — POLYETHYLENE GLYCOL 3350 17 G/17G
17 POWDER, FOR SOLUTION ORAL DAILY
Status: DISCONTINUED | OUTPATIENT
Start: 2023-07-14 | End: 2023-07-14 | Stop reason: HOSPADM

## 2023-07-13 RX ORDER — ATORVASTATIN CALCIUM 80 MG/1
80 TABLET, FILM COATED ORAL DAILY
Status: DISCONTINUED | OUTPATIENT
Start: 2023-07-14 | End: 2023-07-14 | Stop reason: HOSPADM

## 2023-07-13 RX ORDER — LIDOCAINE HYDROCHLORIDE 20 MG/ML
INJECTION, SOLUTION INFILTRATION; PERINEURAL PRN
Status: DISCONTINUED | OUTPATIENT
Start: 2023-07-13 | End: 2023-07-13

## 2023-07-13 RX ORDER — ONDANSETRON 4 MG/1
4 TABLET, ORALLY DISINTEGRATING ORAL EVERY 6 HOURS PRN
Status: DISCONTINUED | OUTPATIENT
Start: 2023-07-13 | End: 2023-07-14 | Stop reason: HOSPADM

## 2023-07-13 RX ORDER — NALOXONE HYDROCHLORIDE 0.4 MG/ML
0.4 INJECTION, SOLUTION INTRAMUSCULAR; INTRAVENOUS; SUBCUTANEOUS
Status: DISCONTINUED | OUTPATIENT
Start: 2023-07-13 | End: 2023-07-14 | Stop reason: HOSPADM

## 2023-07-13 RX ORDER — CLOPIDOGREL BISULFATE 75 MG/1
75 TABLET ORAL DAILY
Status: DISCONTINUED | OUTPATIENT
Start: 2023-07-14 | End: 2023-07-14 | Stop reason: HOSPADM

## 2023-07-13 RX ORDER — LIDOCAINE 40 MG/G
CREAM TOPICAL
Status: DISCONTINUED | OUTPATIENT
Start: 2023-07-13 | End: 2023-07-14 | Stop reason: HOSPADM

## 2023-07-13 RX ORDER — ASPIRIN 81 MG/1
81 TABLET, CHEWABLE ORAL
Status: COMPLETED | OUTPATIENT
Start: 2023-07-13 | End: 2023-07-13

## 2023-07-13 RX ORDER — FENTANYL CITRATE 0.05 MG/ML
50 INJECTION, SOLUTION INTRAMUSCULAR; INTRAVENOUS
Status: DISCONTINUED | OUTPATIENT
Start: 2023-07-13 | End: 2023-07-13 | Stop reason: HOSPADM

## 2023-07-13 RX ORDER — MAGNESIUM HYDROXIDE 1200 MG/15ML
LIQUID ORAL PRN
Status: DISCONTINUED | OUTPATIENT
Start: 2023-07-13 | End: 2023-07-13 | Stop reason: HOSPADM

## 2023-07-13 RX ORDER — BISACODYL 10 MG
10 SUPPOSITORY, RECTAL RECTAL DAILY PRN
Status: DISCONTINUED | OUTPATIENT
Start: 2023-07-13 | End: 2023-07-14 | Stop reason: HOSPADM

## 2023-07-13 RX ORDER — ASPIRIN 81 MG/1
81 TABLET ORAL DAILY
Status: DISCONTINUED | OUTPATIENT
Start: 2023-07-14 | End: 2023-07-14 | Stop reason: HOSPADM

## 2023-07-13 RX ORDER — OXYCODONE HYDROCHLORIDE 5 MG/1
5 TABLET ORAL EVERY 4 HOURS PRN
Status: DISCONTINUED | OUTPATIENT
Start: 2023-07-13 | End: 2023-07-14 | Stop reason: HOSPADM

## 2023-07-13 RX ORDER — FENTANYL CITRATE 50 UG/ML
INJECTION, SOLUTION INTRAMUSCULAR; INTRAVENOUS PRN
Status: DISCONTINUED | OUTPATIENT
Start: 2023-07-13 | End: 2023-07-13

## 2023-07-13 RX ORDER — OXYCODONE HYDROCHLORIDE 5 MG/1
10 TABLET ORAL EVERY 4 HOURS PRN
Status: DISCONTINUED | OUTPATIENT
Start: 2023-07-13 | End: 2023-07-14 | Stop reason: HOSPADM

## 2023-07-13 RX ORDER — ONDANSETRON 2 MG/ML
4 INJECTION INTRAMUSCULAR; INTRAVENOUS EVERY 6 HOURS PRN
Status: DISCONTINUED | OUTPATIENT
Start: 2023-07-13 | End: 2023-07-14 | Stop reason: HOSPADM

## 2023-07-13 RX ORDER — ACETAMINOPHEN 325 MG/1
650 TABLET ORAL EVERY 4 HOURS PRN
Status: DISCONTINUED | OUTPATIENT
Start: 2023-07-16 | End: 2023-07-14 | Stop reason: HOSPADM

## 2023-07-13 RX ORDER — ACETAMINOPHEN 325 MG/1
975 TABLET ORAL EVERY 8 HOURS
Status: DISCONTINUED | OUTPATIENT
Start: 2023-07-13 | End: 2023-07-14 | Stop reason: HOSPADM

## 2023-07-13 RX ORDER — PROPOFOL 10 MG/ML
INJECTION, EMULSION INTRAVENOUS PRN
Status: DISCONTINUED | OUTPATIENT
Start: 2023-07-13 | End: 2023-07-13

## 2023-07-13 RX ORDER — HYDROMORPHONE HCL IN WATER/PF 6 MG/30 ML
0.4 PATIENT CONTROLLED ANALGESIA SYRINGE INTRAVENOUS EVERY 5 MIN PRN
Status: DISCONTINUED | OUTPATIENT
Start: 2023-07-13 | End: 2023-07-13 | Stop reason: HOSPADM

## 2023-07-13 RX ORDER — MEPERIDINE HYDROCHLORIDE 25 MG/ML
12.5 INJECTION INTRAMUSCULAR; INTRAVENOUS; SUBCUTANEOUS EVERY 5 MIN PRN
Status: DISCONTINUED | OUTPATIENT
Start: 2023-07-13 | End: 2023-07-13 | Stop reason: HOSPADM

## 2023-07-13 RX ORDER — ONDANSETRON 2 MG/ML
4 INJECTION INTRAMUSCULAR; INTRAVENOUS EVERY 30 MIN PRN
Status: DISCONTINUED | OUTPATIENT
Start: 2023-07-13 | End: 2023-07-13 | Stop reason: HOSPADM

## 2023-07-13 RX ORDER — LIDOCAINE 40 MG/G
CREAM TOPICAL
Status: DISCONTINUED | OUTPATIENT
Start: 2023-07-13 | End: 2023-07-13 | Stop reason: HOSPADM

## 2023-07-13 RX ADMIN — METOPROLOL TARTRATE 25 MG: 25 TABLET, FILM COATED ORAL at 20:07

## 2023-07-13 RX ADMIN — ACETAMINOPHEN 975 MG: 325 TABLET, FILM COATED ORAL at 21:03

## 2023-07-13 RX ADMIN — ROCURONIUM BROMIDE 20 MG: 50 INJECTION, SOLUTION INTRAVENOUS at 12:12

## 2023-07-13 RX ADMIN — Medication 2 G: at 10:50

## 2023-07-13 RX ADMIN — Medication 5 MG: at 11:31

## 2023-07-13 RX ADMIN — ROCURONIUM BROMIDE 50 MG: 50 INJECTION, SOLUTION INTRAVENOUS at 10:45

## 2023-07-13 RX ADMIN — GLYCOPYRROLATE 0.1 MG: 0.2 INJECTION, SOLUTION INTRAMUSCULAR; INTRAVENOUS at 11:28

## 2023-07-13 RX ADMIN — Medication 5 MG: at 11:33

## 2023-07-13 RX ADMIN — PHENYLEPHRINE HYDROCHLORIDE 100 MCG: 10 INJECTION INTRAVENOUS at 11:56

## 2023-07-13 RX ADMIN — PHENYLEPHRINE HYDROCHLORIDE 100 MCG: 10 INJECTION INTRAVENOUS at 11:26

## 2023-07-13 RX ADMIN — PROTAMINE SULFATE 40 MG: 10 INJECTION, SOLUTION INTRAVENOUS at 14:19

## 2023-07-13 RX ADMIN — PHENYLEPHRINE HYDROCHLORIDE 100 MCG: 10 INJECTION INTRAVENOUS at 14:30

## 2023-07-13 RX ADMIN — FENTANYL CITRATE 100 MCG: 50 INJECTION, SOLUTION INTRAMUSCULAR; INTRAVENOUS at 10:45

## 2023-07-13 RX ADMIN — FENTANYL CITRATE 100 MCG: 50 INJECTION, SOLUTION INTRAMUSCULAR; INTRAVENOUS at 13:15

## 2023-07-13 RX ADMIN — SODIUM CHLORIDE, POTASSIUM CHLORIDE, SODIUM LACTATE AND CALCIUM CHLORIDE: 600; 310; 30; 20 INJECTION, SOLUTION INTRAVENOUS at 10:13

## 2023-07-13 RX ADMIN — Medication 5 MG: at 11:54

## 2023-07-13 RX ADMIN — PHENYLEPHRINE HYDROCHLORIDE 100 MCG: 10 INJECTION INTRAVENOUS at 10:45

## 2023-07-13 RX ADMIN — PHENYLEPHRINE HYDROCHLORIDE 100 MCG: 10 INJECTION INTRAVENOUS at 13:19

## 2023-07-13 RX ADMIN — LIDOCAINE HYDROCHLORIDE 80 MG: 20 INJECTION, SOLUTION INFILTRATION; PERINEURAL at 10:45

## 2023-07-13 RX ADMIN — PHENYLEPHRINE HYDROCHLORIDE 200 MCG: 10 INJECTION INTRAVENOUS at 10:57

## 2023-07-13 RX ADMIN — Medication 2 G: at 14:50

## 2023-07-13 RX ADMIN — SUGAMMADEX 200 MG: 100 INJECTION, SOLUTION INTRAVENOUS at 15:28

## 2023-07-13 RX ADMIN — FENTANYL CITRATE 50 MCG: 50 INJECTION, SOLUTION INTRAMUSCULAR; INTRAVENOUS at 10:25

## 2023-07-13 RX ADMIN — HEPARIN SODIUM 8000 UNITS: 1000 INJECTION, SOLUTION INTRAVENOUS; SUBCUTANEOUS at 12:06

## 2023-07-13 RX ADMIN — SODIUM CHLORIDE, POTASSIUM CHLORIDE, SODIUM LACTATE AND CALCIUM CHLORIDE: 600; 310; 30; 20 INJECTION, SOLUTION INTRAVENOUS at 14:39

## 2023-07-13 RX ADMIN — Medication 5 MG: at 13:33

## 2023-07-13 RX ADMIN — SODIUM CHLORIDE, POTASSIUM CHLORIDE, SODIUM LACTATE AND CALCIUM CHLORIDE: 600; 310; 30; 20 INJECTION, SOLUTION INTRAVENOUS at 10:39

## 2023-07-13 RX ADMIN — PROPOFOL 200 MG: 10 INJECTION, EMULSION INTRAVENOUS at 10:45

## 2023-07-13 RX ADMIN — DEXMEDETOMIDINE HYDROCHLORIDE 10 MCG: 100 INJECTION, SOLUTION INTRAVENOUS at 14:10

## 2023-07-13 RX ADMIN — HEPARIN SODIUM 2000 UNITS: 1000 INJECTION, SOLUTION INTRAVENOUS; SUBCUTANEOUS at 13:09

## 2023-07-13 RX ADMIN — ACETAMINOPHEN 975 MG: 325 TABLET, FILM COATED ORAL at 10:09

## 2023-07-13 RX ADMIN — ROCURONIUM BROMIDE 30 MG: 50 INJECTION, SOLUTION INTRAVENOUS at 13:15

## 2023-07-13 RX ADMIN — ROCURONIUM BROMIDE 10 MG: 50 INJECTION, SOLUTION INTRAVENOUS at 13:09

## 2023-07-13 RX ADMIN — DEXMEDETOMIDINE HYDROCHLORIDE 10 MCG: 100 INJECTION, SOLUTION INTRAVENOUS at 14:22

## 2023-07-13 RX ADMIN — FENTANYL CITRATE 50 MCG: 50 INJECTION, SOLUTION INTRAMUSCULAR; INTRAVENOUS at 13:40

## 2023-07-13 RX ADMIN — ROCURONIUM BROMIDE 20 MG: 50 INJECTION, SOLUTION INTRAVENOUS at 11:22

## 2023-07-13 RX ADMIN — ROCURONIUM BROMIDE 20 MG: 50 INJECTION, SOLUTION INTRAVENOUS at 14:00

## 2023-07-13 RX ADMIN — ONDANSETRON 4 MG: 2 INJECTION INTRAMUSCULAR; INTRAVENOUS at 14:59

## 2023-07-13 RX ADMIN — PHENYLEPHRINE HYDROCHLORIDE 100 MCG: 10 INJECTION INTRAVENOUS at 13:51

## 2023-07-13 RX ADMIN — DEXAMETHASONE SODIUM PHOSPHATE 4 MG: 4 INJECTION, SOLUTION INTRA-ARTICULAR; INTRALESIONAL; INTRAMUSCULAR; INTRAVENOUS; SOFT TISSUE at 10:50

## 2023-07-13 RX ADMIN — PHENYLEPHRINE HYDROCHLORIDE 100 MCG: 10 INJECTION INTRAVENOUS at 12:15

## 2023-07-13 RX ADMIN — Medication 5 MG: at 13:22

## 2023-07-13 RX ADMIN — PHENYLEPHRINE HYDROCHLORIDE 0.6 MCG/KG/MIN: 10 INJECTION INTRAVENOUS at 11:00

## 2023-07-13 ASSESSMENT — ACTIVITIES OF DAILY LIVING (ADL)
ADLS_ACUITY_SCORE: 20

## 2023-07-13 ASSESSMENT — ENCOUNTER SYMPTOMS: SEIZURES: 0

## 2023-07-13 ASSESSMENT — LIFESTYLE VARIABLES: TOBACCO_USE: 0

## 2023-07-13 NOTE — ANESTHESIA PREPROCEDURE EVALUATION
Anesthesia Pre-Procedure Evaluation    Patient: Bruno Baig   MRN: [id number] : 1950        Procedure : Procedure(s):  LEFT CAROTID ENDARTERECTOMY WITH NASAL INTUBATION AND ELECTROENCEPHALOGRAM          History reviewed. No pertinent past medical history.   Past Surgical History:   Procedure Laterality Date     COLONOSCOPY N/A 2017    Procedure: COLONOSCOPY;  colonoscopy;  Surgeon: Justice Horner MD;  Location: [facility]      No Known Allergies   Social History     Tobacco Use     Smoking status: Every Day     Packs/day: 0.50     Years: 30.00     Pack years: 15.00     Types: Cigarettes     Smokeless tobacco: Never     Tobacco comments:     1/2 pack daily    Substance Use Topics     Alcohol use: Yes     Comment: 5-6 daily      Wt Readings from Last 1 Encounters:   23 80.1 kg (176 lb 8 oz)        Anesthesia Evaluation   Pt has had prior anesthetic.     No history of anesthetic complications       ROS/MED HX  ENT/Pulmonary:    (-) tobacco use, asthma and sleep apnea   Neurologic: Comment: Retinal artery occlusion   (-) no seizures and no CVA   Cardiovascular: Comment: Poor image quality.  Left ventricular systolic function is normal.  The visual ejection fraction is 55-60%.  No hemodynamically significant valvular abnormalities on 2D or color flow  imaging.  ______________________________________________________________________________  Left Ventricle  The left ventricle is normal in size. Left ventricular systolic function is  normal. The visual ejection fraction is 55-60%. Grade I or early diastolic  dysfunction. Regional wall motion abnormalities cannot be excluded due to  limited visualization.     Right Ventricle  The right ventricle is not well visualized. The right ventricular systolic  function is normal.     Atria  The left atrium is not well visualized. Right atrium not well visualized.     Mitral Valve  The mitral valve is not well visualized. There is no mitral regurgitation  noted.  There is no mitral valve stenosis.     Tricuspid Valve  The tricuspid valve is not well visualized. Right ventricular systolic  pressure could not be approximated due to inadequate tricuspid regurgitation.     Aortic Valve  The aortic valve is not well visualized. No hemodynamically significant  valvular aortic stenosis.     Pulmonic Valve  The pulmonic valve is not well visualized.     Vessels  Normal size aorta. Inferior vena cava not well visualized for estimation of  right atrial pressure.     Pericardium  There is no pericardial effusion.     ______________________________________________________________________________    (+) Dyslipidemia hypertension-----    METS/Exercise Tolerance:     Hematologic:       Musculoskeletal:       GI/Hepatic:    (-) GERD   Renal/Genitourinary:     (+) renal disease,     Endo:       Psychiatric/Substance Use:       Infectious Disease:       Malignancy:       Other:            Physical Exam    Airway        Mallampati: II   TM distance: > 3 FB   Neck ROM: full   Mouth opening: > 3 cm    Respiratory Devices and Support         Dental     Comment: Upper dentures        Cardiovascular   cardiovascular exam normal          Pulmonary   pulmonary exam normal                OUTSIDE LABS:  CBC:   Lab Results   Component Value Date    WBC 8.9 07/12/2023    WBC 6.2 06/20/2023    HGB 14.0 07/12/2023    HGB 14.4 06/20/2023    HCT 40.9 07/12/2023    HCT 41.8 06/20/2023     07/12/2023     06/20/2023     BMP:   Lab Results   Component Value Date     07/12/2023     06/20/2023    POTASSIUM 4.4 07/12/2023    POTASSIUM 4.6 06/20/2023    CHLORIDE 103 07/12/2023    CHLORIDE 102 06/20/2023    CO2 24 07/12/2023    CO2 26 06/20/2023    BUN 18.2 07/12/2023    BUN 17.8 06/20/2023    CR 1.10 07/12/2023    CR 1.19 (H) 06/20/2023     (H) 07/13/2023     (H) 07/12/2023     COAGS:   Lab Results   Component Value Date    PTT 29 07/12/2023    INR 1.04 07/12/2023     POC:    Lab Results   Component Value Date     (H) 11/09/2017     HEPATIC:   Lab Results   Component Value Date    ALBUMIN 4.5 06/20/2023    PROTTOTAL 7.2 06/20/2023    ALT 26 06/20/2023    AST 31 06/20/2023    ALKPHOS 83 06/20/2023    BILITOTAL 0.4 06/20/2023     OTHER:   Lab Results   Component Value Date    LACT 0.7 06/05/2019    A1C 5.9 (H) 07/13/2023    ROLDAN 10.7 (H) 07/12/2023    TSH 2.47 07/01/2019       Anesthesia Plan    ASA Status:  3      Anesthesia Type: General (nasal).     - Airway: ETT   Induction: Intravenous.   Maintenance: Balanced.   Techniques and Equipment:     - Airway: Video-Laryngoscope         Consents    Anesthesia Plan(s) and associated risks, benefits, and realistic alternatives discussed. Questions answered and patient/representative(s) expressed understanding.    - Discussed:     - Discussed with:  Patient         Postoperative Care    Pain management: IV analgesics, Oral pain medications.   PONV prophylaxis: Ondansetron (or other 5HT-3)     Comments:    Other Comments: neosynephrine drelvie Licona

## 2023-07-13 NOTE — ANESTHESIA PROCEDURE NOTES
Arterial Line Procedure Note    Pre-Procedure   Staff -        Anesthesiologist:  Colleen Licona       Performed By: anesthesiologist       Location: pre-op       Pre-Anesthestic Checklist: patient identified, IV checked, risks and benefits discussed, informed consent, monitors and equipment checked, pre-op evaluation and at physician/surgeon's request  Timeout:       Correct Patient: Yes        Correct Procedure: Yes        Correct Site: Yes        Correct Position: Yes   Line Placement:   This line was placed Pre Induction starting at 7/13/2023 10:00 AM and ending at 7/13/2023 10:14 AM  Procedure   Procedure: arterial line and new line       Laterality: left       Insertion Site: radial.  Sterile Prep        Standard elements of sterile barrier followed       Skin prep: Chloraprep  Insertion/Injection        Technique: ultrasound guided and Seldinger Technique        Catheter Type/Size: 20 G, 1.75 in/4.5 cm quick cath (integral wire)  Narrative         Secured by: other       Tegaderm dressing used.       Complications: None apparent,        Arterial waveform: Yes        IBP within 10% of NIBP: Yes   Comments:  Pt tolerated well.

## 2023-07-13 NOTE — BRIEF OP NOTE
[facility]    Brief Operative Note    Pre-operative diagnosis: Carotid stenosis, left [I65.22]  Post-operative diagnosis Same as pre-operative diagnosis    Procedure: Procedure(s):  LEFT CAROTID ENDARTERECTOMY with pericardial patch angioplasty and NASAL INTUBATION AND ELECTROENCEPHALOGRAM  Surgeon: Surgeon(s) and Role:     * Enzo Amanda MD - Primary     * Liss Parr MD - Resident - Assisting  Anesthesia: General   Estimated Blood Loss: 100 mL from 7/13/2023 10:38 AM to 7/13/2023  3:37 PM      Drains: None  Specimens: * No specimens in log *  Findings:   extensive plague in left common carotic extending into ICA and ECA. CEA performed and repaired with patch angioplasty .  Complications: None.  Implants:   Implant Name Type Inv. Item Serial No.  Lot No. LRB No. Used Action   IMP PATCH PERICARDIUM PHOTOFIX BOVINE 0.8X8CM [id number] - [id number] Bone/Tissue/Biologic IMP PATCH PERICARDIUM PHOTOFIX BOVINE 0.8X8CM [id number] [facility] [id number] Left 1 Implanted

## 2023-07-13 NOTE — INTERVAL H&P NOTE
"I have reviewed the surgical (or preoperative) H&P that is linked to this encounter, and examined the patient. There are no significant changes    Clinical Conditions Present on Arrival:  Clinically Significant Risk Factors Present on Admission          # Hypercalcemia: Highest Ca = 10.7 mg/dL in last 2 days, will monitor as appropriate        # Drug Induced Platelet Defect: home medication list includes an antiplatelet medication  # Overweight: Estimated body mass index is 26.84 kg/m  as calculated from the following:    Height as of this encounter: 1.727 m (5' 8\").    Weight as of this encounter: 80.1 kg (176 lb 8 oz).       "

## 2023-07-13 NOTE — ANESTHESIA POSTPROCEDURE EVALUATION
Patient: Bruno Baig    Procedure: Procedure(s):  LEFT CAROTID ENDARTERECTOMY with pericardial patch angioplasty and NASAL INTUBATION AND ELECTROENCEPHALOGRAM       Anesthesia Type:  General    Note:  Disposition: Admission   Postop Pain Control: Uneventful            Sign Out: Well controlled pain   PONV: No   Neuro/Psych: Uneventful            Sign Out: Acceptable/Baseline neuro status   Airway/Respiratory: Uneventful            Sign Out: Acceptable/Baseline resp. status   CV/Hemodynamics: Uneventful            Sign Out: Acceptable CV status; No obvious hypovolemia; No obvious fluid overload   Other NRE: NONE   DID A NON-ROUTINE EVENT OCCUR? No           Last vitals:  Vitals Value Taken Time   /60 07/13/23 1645   Temp 37.1  C (98.7  F) 07/13/23 1645   Pulse 67 07/13/23 1648   Resp 17 07/13/23 1648   SpO2 91 % 07/13/23 1648   Vitals shown include unvalidated device data.    Electronically Signed By: Akbar Villegas MD  July 13, 2023  4:50 PM

## 2023-07-13 NOTE — PLAN OF CARE
"Reason for Admission: L CEA    Cognitive/Mentation: A/Ox 4  Neuros/CMS: Intact ex L eye had lower R quadrant \"fogginess\" (present prior to procedure per patient)  VS: stable.   Tele: NSR.  GI: BS active x4, passing flatus, last BM 7/12/23. Continent.  : Feliciano in place, to be removed POD 1.  Pulmonary: LS clear throughout.  Pain: reports minimal pain in incision, \" 0.5/10.     Drains/Lines: PIV SL x2, Feliciano patent  Skin: L neck incision  Activity: Assist x 1 with GB.  Diet: Low fat diet with thin liquids. Takes pills whole with water.     Therapies recs: pending  Discharge: pending    Aggression Stoplight Tool: green    End of shift summary: Arrived on unit at 1710.  neuros stable throughout shift.     "

## 2023-07-13 NOTE — OP NOTE
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    POSTOPERATIVE DIAGNOSIS:  Symptomatic (left branch central retinal artery occlusion) left carotid stenosis.    PROCEDURE PERFORMED:  Left carotid endarterectomy with bovine pericardial patch angioplasty.    SURGEON:  Gomez Amanda M.D.     ASSISTANT:  Keshav Leija M.D.  She is a [facility] surgery resident.    ANESTHESIA:  General endotracheal anesthesia.    ESTIMATED BLOOD LOSS:  100 mL    INDICATIONS FOR PROCEDURE:  This patient is a 72-year-old gentleman who recently had a partial loss of vision in his left eye. An outside ophthalmologist documented branch occlusion of the left retinal artery.  Subsequent imaging has confirmed a greater than 90% irregular left carotid stenosis.  He presents at this time for left carotid endarterectomy.    OPERATIVE FINDINGS:  There was an ulcerated heterogeneous plaque compromising the left carotid bulb and proximal left internal carotid artery by perhaps 90%.  Distal to the plaque, the internal carotid artery was rather small in caliber, perhaps 3 mm in diameter.  At the completion of this procedure, we had a palpable pulse in the internal carotid distal to our patch with a triphasic Doppler signal.  The patient awoke neurologically intact.    DESCRIPTION OF TECHNIQUE:  After informed consent was obtained, the patient was brought to the operating room and placed on the table in a supine position.  He was nasally intubated per Anesthesia.  A Feliciano catheter and EEG leads were placed.  He was appropriately positioned and his left neck was prepped and draped in the usual sterile fashion.  A timeout was called, and we verified the patient's identity, the operative site, and the proposed procedure.  I began with an oblique incision on the left neck along the anterior border of the sternocleidomastoid muscle.  Dissection proceeded sharply downward to  expose the mid cervical left common carotid just above the omohyoid muscle.  The common carotid at this level was soft and clampable.  I identified the vagus nerve posteriorly and carefully avoided it throughout the remainder of the dissection.  Dissection continued distally up the common carotid to the carotid bifurcation.  A large crossing facial vein was divided and doubly ligated with silk ties.  The external carotid and superior thyroidal arteries were dissected free and encircled with a vessel loop.  Dissection continued distally up the internal carotid.  The hypoglossal nerve was identified and it was somewhat tethered by the ansa.  We divided the ansa between silk ties and fully mobilized the hypoglossal nerve to obtain adequate exposure of the distal internal carotid.  With these maneuvers, I exposed approximately 3.5 cm of the internal carotid to a level that was soft and above the obvious disease.  The internal carotid was encircled with a vessel loop.  The patient was systemically heparinized with 8000 units of intravenous heparin.  After a 5-minute circulation time, the distal internal carotid was clamped, followed by the external carotid and common carotid arteries.  There were no EEG changes.  I chose not to place a shunt.  An arteriotomy was made on the mid cervical common carotid and extended up the internal carotid to a point above the level of disease.  Proximal and distal intima was scored with a [place] blade.  The endarterectomy plane was developed using a [place] elevator.  We obtained excellent proximal and distal taper points.  We performed eversion endarterectomy of the external carotid.  The plaque was removed from the field en bloc.  Residual strands of fibrinous debris were removed from the endarterectomized surface until I was fully satisfied with its quality.  A bovine pericardial patch was selected.  We proceeded with patch angioplasty using running 6-0 Prolene circumferentially.  Prior  to placing the final stitches in the patch angioplasty suture line, all clamps and vessel loops were briefly released to flush any debris out of the arterial lumen.  It was copiously irrigated with heparinized saline and observed to be clear.  I placed the remaining stitches in the patch angioplasty suture line.  It was secured and flow was preferentially directed up the external carotid for several heartbeats before relinquishing control of the distal internal carotid.  Immediately noted was a palpable pulse in the internal carotid distal to our patch with a triphasic Doppler signal.  I chose to accept this.  The patient had remained on his aspirin and Plavix uninterrupted.  We had diffuse needle hole bleeding coming from the patch.  I did place a few 7-0 Prolene repair sutures.  Surgicel gauze and gentle compression were held for several minutes.  We still had diffuse oozing.  The patient had been rebolused with an additional 2000 units of heparin during the case for a total dose of 10,000 units.  This was partially reversed with 40 mg of protamine.  We continued holding pressure over the patch for 15 more minutes.  Finally, we had excellent hemostasis.  The wound was irrigated and suctioned clear.  Closure then proceeded utilizing interrupted 2-0 Vicryl to reapproximate deep cervical fascia.  Platysma was closed with a running 3-0 Vicryl suture.  Skin was closed with a 4-0 Monocryl running subcuticular stitch.  Steri-Strips and sterile dressings were applied.  Final sponge and needle count were reported as correct.  The patient tolerated the procedure without incident.  He was subsequently extubated.  He was observed to follow commands with all 4 extremities and his tongue was in the midline.  He was transported awake and hemodynamically stable to the recovery room.    Enzo Amanda MD        D: 07/13/2023   T: 07/13/2023   MT: pieter    Name:     ZAKIYA VILLALOBOS  MRN:      [id number]        Account:         [id number]   :      1950           Procedure Date: 2023     Document: [id number]

## 2023-07-13 NOTE — ANESTHESIA CARE TRANSFER NOTE
Patient: Bruno Baig    Procedure: Procedure(s):  LEFT CAROTID ENDARTERECTOMY with pericardial patch angioplasty and NASAL INTUBATION AND ELECTROENCEPHALOGRAM       Diagnosis: Carotid stenosis, left [I65.22]  Diagnosis Additional Information: No value filed.    Anesthesia Type:   General     Note:    Oropharynx: oropharynx clear of all foreign objects and spontaneously breathing  Level of Consciousness: awake  Oxygen Supplementation: face mask  Level of Supplemental Oxygen (L/min / FiO2): 6  Independent Airway: airway patency satisfactory and stable  Dentition: dentition unchanged  Vital Signs Stable: post-procedure vital signs reviewed and stable  Report to RN Given: handoff report given  Patient transferred to: PACU  Comments: Neuromuscular blockade reversed with sugammadex, spontaneous respirations, adequate tidal volumes, followed commands to voice, oropharynx suctioned with soft flexible catheter, extubated atraumatically, extubated with suction, airway patent after extubation.  Oxygen via facemask at 6 liters per minute to PACU. Oxygen tubing connected to wall O2 in PACU, SpO2, NiBP, and EKG monitors and alarms on and functioning, Neal Hugger warmer connected to patient gown, report on patient's clinical status given to PACU RN, RN questions answered.     Handoff Report: Identifed the Patient, Identified the Reponsible Provider, Reviewed the pertinent medical history, Discussed the surgical course, Reviewed Intra-OP anesthesia mangement and issues during anesthesia, Set expectations for post-procedure period and Allowed opportunity for questions and acknowledgement of understanding      Vitals:  Vitals Value Taken Time   /72 07/13/23 1540   Temp     Pulse 77 07/13/23 1545   Resp 26 07/13/23 1545   SpO2 96 % 07/13/23 1545   Vitals shown include unvalidated device data.    Electronically Signed By: ILENE Ford CRNA  July 13, 2023  3:45 PM

## 2023-07-13 NOTE — ANESTHESIA PROCEDURE NOTES
Airway       Patient location during procedure: OR       Procedure Start/Stop Times: 7/13/2023 10:48 AM  Staff -        CRNA: Sancho Arango APRN CRNA       Performed By: CRNA  Consent for Airway        Urgency: elective  Indications and Patient Condition       Indications for airway management: monica-procedural       Induction type:intravenous       Mask difficulty assessment: 1 - vent by mask    Final Airway Details       Final airway type: endotracheal airway       Successful airway: ETT - single, Nasal and YOVANI  Endotracheal Airway Details        ETT size (mm): 7.5       Cuffed: yes       Successful intubation technique: direct laryngoscopy       DL Blade Type: Maynard 2       Grade View of Cords: 1       Position: Right       Measured from: nares       Secured at (cm): 27       Bite block used: None    Post intubation assessment        Placement verified by: capnometry, equal breath sounds and chest rise        Number of attempts at approach: 1       Number of other approaches attempted: 0       Secured with: silk tape       Ease of procedure: easy       Dentition: Intact and Unchanged    Medication(s) Administered   Medication Administration Time: 7/13/2023 10:48 AM

## 2023-07-14 ENCOUNTER — APPOINTMENT (OUTPATIENT)
Dept: OCCUPATIONAL THERAPY | Facility: CLINIC | Age: 73
DRG: 038 | End: 2023-07-14
Attending: SURGERY
Payer: COMMERCIAL

## 2023-07-14 VITALS
OXYGEN SATURATION: 97 % | SYSTOLIC BLOOD PRESSURE: 154 MMHG | BODY MASS INDEX: 26.75 KG/M2 | WEIGHT: 176.5 LBS | TEMPERATURE: 97.7 F | HEIGHT: 68 IN | RESPIRATION RATE: 16 BRPM | HEART RATE: 55 BPM | DIASTOLIC BLOOD PRESSURE: 68 MMHG

## 2023-07-14 PROCEDURE — 99407 BEHAV CHNG SMOKING > 10 MIN: CPT | Performed by: OCCUPATIONAL THERAPIST

## 2023-07-14 PROCEDURE — 250N000013 HC RX MED GY IP 250 OP 250 PS 637: Performed by: STUDENT IN AN ORGANIZED HEALTH CARE EDUCATION/TRAINING PROGRAM

## 2023-07-14 RX ORDER — ASPIRIN 81 MG/1
81 TABLET ORAL DAILY
Qty: 60 TABLET | Refills: 1 | Status: SHIPPED | OUTPATIENT
Start: 2023-07-15

## 2023-07-14 RX ORDER — CLOPIDOGREL BISULFATE 75 MG/1
75 TABLET ORAL DAILY
Qty: 90 TABLET | Refills: 1 | Status: SHIPPED | OUTPATIENT
Start: 2023-07-15 | End: 2024-07-01

## 2023-07-14 RX ADMIN — ATORVASTATIN CALCIUM 80 MG: 80 TABLET, FILM COATED ORAL at 08:49

## 2023-07-14 RX ADMIN — SENNOSIDES AND DOCUSATE SODIUM 1 TABLET: 50; 8.6 TABLET ORAL at 08:49

## 2023-07-14 RX ADMIN — CLOPIDOGREL BISULFATE 75 MG: 75 TABLET ORAL at 08:49

## 2023-07-14 RX ADMIN — ASPIRIN 81 MG: 81 TABLET, COATED ORAL at 08:49

## 2023-07-14 RX ADMIN — ACETAMINOPHEN 975 MG: 325 TABLET, FILM COATED ORAL at 05:12

## 2023-07-14 RX ADMIN — POLYETHYLENE GLYCOL 3350 17 G: 17 POWDER, FOR SOLUTION ORAL at 08:49

## 2023-07-14 RX ADMIN — METOPROLOL TARTRATE 25 MG: 25 TABLET, FILM COATED ORAL at 09:48

## 2023-07-14 ASSESSMENT — ACTIVITIES OF DAILY LIVING (ADL)
ADLS_ACUITY_SCORE: 20

## 2023-07-14 NOTE — PLAN OF CARE
Reason for Admission: POD#1 L CEA    Cognitive/Mentation: A/Ox 4  Neuros/CMS: Intact ex baseline L eye RLQ foggy vision-unchanged  VS: stable on RA.   Tele: SB.  GI: BS active, passing flatus, last BM PTA. Continent.  : Feliciano removed 0600. DTV  Pulmonary: LS clear.  Pain: 1/10 incisional pain, scheduled tylenol admin with good effect.     Drains/Lines: PIV SL  Skin: Incision covered with dressing, marked drainage.   Activity: Not OOB, likely SBA  Diet: Regular, low sat fat with thin liquids. Takes pills whole.     Discharge: pending    Aggression Stoplight Tool: green

## 2023-07-14 NOTE — PROGRESS NOTES
"Vascular Surgery Note    S : no acute issues    O  BP (!) 79/46 (BP Location: Right arm)   Pulse 61   Temp 97.4  F (36.3  C) (Oral)   Resp 16   Ht 1.727 m (5' 8\")   Wt 80.1 kg (176 lb 8 oz)   SpO2 94%   BMI 26.84 kg/m    Gen ao X3  Resp NLB  Neuro: tongue midline, no facial asymmetry, 5.5 motor strength all 4 extremities  Incision C.D without any hematoma    A/P  POD 1 s/p L CEA for symtpomatic carotid artery stenosis    - Reg diet  - Home today if able to void     Val Green MD  Fellow          "

## 2023-07-14 NOTE — PROGRESS NOTES
MD Notification    Notified Person: MD    Notified Person Name: Val Green MD      Notification Date/Time:7/14 11:49    Notification Interaction: telephone    Purpose of Notification: order clarification    Orders Received: okay to discharge on 81 mg of aspirin.    Comments: duplicate orders found on patient AVS.

## 2023-07-14 NOTE — DISCHARGE SUMMARY
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    PROCEDURES PERFORMED: Left carotid endarterectomy with bovine pericardial patch angioplasty.    PATHOLOGY RESULTS: None     CULTURE RESULTS: None     INTRAOPERATIVE COMPLICATIONS: None     POSTOPERATIVE MEDICAL ISSUES: None     DRAINS/TUBES PRESENT AT DISCHARGE: None    DATE OF DISCHARGE:      HOSPITAL COURSE: Bruno Baig is a 72 year old male who on 2023  underwent the above-named procedures.  he tolerated the operation well and postoperatively was transferred to the general post-surgical unit.  The remainder of his course was essentially uncomplicated.  Prior to discharge, his pain was controlled well, he was able to perform ADLs and ambulate independently without difficulty, and had full return of bowel and bladder function.  On , he was discharged to home in stable condition  DISCHARGE EXAM:   A&O, NAD  Resp non-labored  Distal extremities warm    Neuro: tongue midline, no facial asymmetry, 5.5 motor strength all 4 extremities  Incision C.D without any hematoma    DISCHARGE INSTRUCTIONS:  Discharge Procedure Orders   Reason for your hospital stay   Order Comments: Carotid endarterectomy on      Follow-up and recommended labs and tests    Order Comments: Follow up with Dr. Amanda , at [facility], within 2 weeks to evaluate medication change, to evaluate after surgery, and for hospital follow- up. No follow up labs or test are needed.     Activity   Order Comments: Your activity upon discharge: activity as tolerated and activity as tolerated and no driving for today     Order Specific Question Answer Comments   Is discharge order? Yes      Diet   Order Comments: Follow this diet upon discharge: Orders Placed This Encounter      Advance  Diet as Tolerated: Regular Diet Adult; Low Saturated Fat Diet     Order Specific Question Answer Comments   Is discharge order? Yes        DISCHARGE MEDICATIONS:   Current Discharge Medication List      START taking these medications    Details   aspirin 81 MG EC tablet Take 1 tablet (81 mg) by mouth daily  Qty: 60 tablet, Refills: 1    Associated Diagnoses: Symptomatic carotid artery stenosis without infarction, unspecified laterality      !! clopidogrel (PLAVIX) 75 MG tablet Take 1 tablet (75 mg) by mouth daily  Qty: 90 tablet, Refills: 1    Associated Diagnoses: Symptomatic carotid artery stenosis without infarction, unspecified laterality       !! - Potential duplicate medications found. Please discuss with provider.      CONTINUE these medications which have NOT CHANGED    Details   amLODIPine (NORVASC) 5 MG tablet Take 1 tablet (5 mg) by mouth daily  Qty: 90 tablet, Refills: 3    Associated Diagnoses: Benign essential hypertension      aspirin (ASA) 325 MG tablet Take 1 tablet (325 mg) by mouth daily      atorvastatin (LIPITOR) 40 MG tablet Take 2 tablets (80 mg) by mouth daily  Qty: 180 tablet, Refills: 3    Associated Diagnoses: Hyperlipidemia LDL goal <100      Cholecalciferol (VITAMIN D3) 2000 units CAPS Take 1 capsule by mouth every morning       !! clopidogrel (PLAVIX) 75 MG tablet Take 1 tablet (75 mg) by mouth daily  Qty: 30 tablet, Refills: 3    Associated Diagnoses: Bilateral carotid artery stenosis      Cyanocobalamin (B-12) 1000 MCG CAPS Take 1 capsule by mouth daily      indomethacin (INDOCIN) 25 MG capsule Take 1 capsule by mouth as needed for moderate pain (for gout flare ups)      metoprolol tartrate (LOPRESSOR) 25 MG tablet Take 1 tablet (25 mg) by mouth 2 times daily  Qty: 60 tablet, Refills: 6    Associated Diagnoses: Infrarenal abdominal aortic aneurysm (AAA) without rupture (H); Bilateral carotid artery stenosis; Benign essential hypertension      omega-3 acid ethyl esters (LOVAZA) 1 g  capsule Take 2 capsules (2 g) by mouth 2 times daily  Qty: 120 capsule, Refills: 11    Associated Diagnoses: Hypertriglyceridemia      blood glucose monitoring (NO BRAND SPECIFIED) meter device kit 1 kit by [employer] route 2 times daily Use to test blood sugar 2 times daily or as directed.  Qty: 1 kit, Refills: 0    Associated Diagnoses: Prediabetes      blood glucose monitoring (NO BRAND SPECIFIED) test strip Use to test blood sugars 2 times daily or as directed  Qty: 1 Box, Refills: 1    Associated Diagnoses: Prediabetes       !! - Potential duplicate medications found. Please discuss with provider.                   Val Green MD  Fellow

## 2023-07-14 NOTE — PLAN OF CARE
Reason for Admission:POD 1 CEA    Cognitive/Mentation: A/Ox 4  Neuros/CMS: intact  VS: stable.  GI: BS present, passing flatus. Continent.  : Continent.  Pulmonary: LS clear .  Pain: denies.     Drains/Lines: piv a patent and intact  Skin: CDI  Activity: SBA.  Diet: reg with thin liquids. Takes pills whole.     Therapies recs: home  Discharge: today    Aggression Stoplight Tool: green

## 2023-07-17 LAB
ATRIAL RATE - MUSE: 54 BPM
DIASTOLIC BLOOD PRESSURE - MUSE: NORMAL MMHG
INTERPRETATION ECG - MUSE: NORMAL
P AXIS - MUSE: 23 DEGREES
PR INTERVAL - MUSE: 164 MS
QRS DURATION - MUSE: 72 MS
QT - MUSE: 384 MS
QTC - MUSE: 364 MS
R AXIS - MUSE: 69 DEGREES
SYSTOLIC BLOOD PRESSURE - MUSE: NORMAL MMHG
T AXIS - MUSE: 68 DEGREES
VENTRICULAR RATE- MUSE: 54 BPM

## 2023-07-28 ENCOUNTER — OFFICE VISIT (OUTPATIENT)
Dept: OTHER | Facility: CLINIC | Age: 73
End: 2023-07-28
Attending: SURGERY
Payer: COMMERCIAL

## 2023-07-28 VITALS — DIASTOLIC BLOOD PRESSURE: 77 MMHG | SYSTOLIC BLOOD PRESSURE: 173 MMHG | OXYGEN SATURATION: 97 % | HEART RATE: 59 BPM

## 2023-07-28 DIAGNOSIS — Z09 SURGICAL FOLLOW-UP CARE: ICD-10-CM

## 2023-07-28 DIAGNOSIS — L03.221 CELLULITIS OF NECK: Primary | ICD-10-CM

## 2023-07-28 PROCEDURE — G0463 HOSPITAL OUTPT CLINIC VISIT: HCPCS

## 2023-07-28 PROCEDURE — 99024 POSTOP FOLLOW-UP VISIT: CPT | Performed by: SURGERY

## 2023-07-28 RX ORDER — CEPHALEXIN 500 MG/1
500 CAPSULE ORAL 4 TIMES DAILY
Qty: 28 CAPSULE | Refills: 0 | Status: SHIPPED | OUTPATIENT
Start: 2023-07-28 | End: 2024-07-01

## 2023-07-28 NOTE — PROGRESS NOTES
Dash is POD 15 symptomatic left carotid endarterectomy (left branch central retinal artery occlusion).  He did well with that procedure and was discharged home on POD 1.  He returns today for his first postop check.    He denies any change in his left eye vision.  He denies dysphagia or neurologic focality.  His only concern is that of some induration in the inferior aspect of the left neck incision.    Exam:  Well-developed male alert and oriented x3.  Blood pressure 173/77 with a pulse of 59.  The left neck incision is clear superiorly.  The inferior aspect is firm and slightly raised consistent with underlying hematoma.  It blanches slightly to touch.  Neurologic exam entirely nonfocal.          Ecchymosis in the dependent portion of the left carotid incision.    ASSESSMENT:  POD 15 left carotid endarterectomy with small hematoma in the dependent aspect of the left neck incision.  No dysphagia or tenderness.  Neurologic exam intact.    PLAN:  I reviewed all the above with Dash.  The ecchymosis does shiloh slightly to touch.  I will give him a prescription for Keflex 500 mg 4 times daily for 1 week.  I recommend ice packs to the affected area several times per day.  Follow-up will be with me in 2 weeks for a clinic visit only.    All of his questions were answered and he verbalizes full understanding to the above.    Gomez Amanda MD

## 2023-07-28 NOTE — PROGRESS NOTES
Patient is here to discuss follow up    BP (!) 173/77 (BP Location: Right arm, Patient Position: Chair, Cuff Size: Adult Regular)   Pulse 59   SpO2 97%     Questions patient would like addressed today are: incision pain, throbbing. Difficulty sleeping. No feeling around incision. Occ sore throat.     Refills are needed: No    Has homecare services and agency name:  [facility] MATTHEWS

## 2023-08-01 ENCOUNTER — TELEPHONE (OUTPATIENT)
Dept: OTHER | Facility: CLINIC | Age: 73
End: 2023-08-01
Payer: COMMERCIAL

## 2023-08-01 NOTE — TELEPHONE ENCOUNTER
[facility] VASCULAR [facility] CENTER    Who is the name of the provider?:  Pearl Sanderson    What is the location you see this provider at/preferred location?: [place]  Person calling / Facility: Bruno Mckeeson  Phone number:  [phone]  Nurse call back needed:  NO     Reason for call:  Patient asking if either of his upcoming visits with [facility] (Vasiliy/Maria E) can be virtual. Please advise    Pharmacy location:  n/a  Outside Imaging: n/a   Can we leave a detailed message on this number?  YES

## 2023-08-01 NOTE — TELEPHONE ENCOUNTER
Appointment with Pearl Sanderson PA-C is for a blood pressure check.  If patient has a blood pressure log at home, appointment may be virtual.    Appointment with Dr. Amanda will be coordinated with an ultrasound - both of which must be in person.

## 2023-08-02 NOTE — TELEPHONE ENCOUNTER
Per below notes:  No active request for an ultrasound for Dr Amanda, will this be coordinated during the office visit?    Routing back to RN to advise before calling patient.

## 2023-08-02 NOTE — TELEPHONE ENCOUNTER
Per 7/28/23 visit with Dr. Amanda:  Follow-up will be with me in 2 weeks for a clinic visit only.     Patient will be due for a carotid ultrasound in approximately 3 months.    Visit with Dr. Amanda must be in person so Dr. Amanda can reassess incision.    Lety Oliva, BSN, RN-[facility]

## 2023-08-02 NOTE — TELEPHONE ENCOUNTER
Called patient to inform of the below, patient states it is to far of a drive to come back to the clinic on 08/14/23 for a blood pressure check.  Patient will be in for his in clinic visit with Dr Amanda on 08/09/23 and will mention to get his Bp checked at the time of this visit.

## 2023-08-08 ENCOUNTER — TELEPHONE (OUTPATIENT)
Dept: OTHER | Facility: CLINIC | Age: 73
End: 2023-08-08
Payer: COMMERCIAL

## 2023-08-08 NOTE — TELEPHONE ENCOUNTER
[facility] VASCULAR HE[facility] CENTER    Who is the name of the provider?:  Pearl Sanderson    What is the location you see this provider at/preferred location?: [place]  Person calling / Facility: Bruno Jovanni  Phone number:  [phone]   Nurse call back needed:  YES     Reason for call:  Patient asking for call back to discuss his medications.    Pharmacy location:  n/a  Outside Imaging: n/a   Can we leave a detailed message on this number?  YES

## 2023-08-08 NOTE — TELEPHONE ENCOUNTER
I returned call to Bruno and he asked if Pearl wanted him to stay on Lopressor and Lovaza a. I explained yes and that she sent a years worth of refills to [facility] at his LOV on 7/11/23 and to call the pharmacy to have them filled. He verbalized understanding.     Farida RODRIGUEZ, RN    [facility]  Vascular [facility] Center  Office: [phone]  Fax: [phone]

## 2023-08-09 ENCOUNTER — OFFICE VISIT (OUTPATIENT)
Dept: OTHER | Facility: CLINIC | Age: 73
End: 2023-08-09
Attending: SURGERY
Payer: COMMERCIAL

## 2023-08-09 VITALS — DIASTOLIC BLOOD PRESSURE: 82 MMHG | HEART RATE: 55 BPM | SYSTOLIC BLOOD PRESSURE: 166 MMHG

## 2023-08-09 DIAGNOSIS — Z09 SURGICAL FOLLOW-UP CARE: Primary | ICD-10-CM

## 2023-08-09 DIAGNOSIS — I65.22 SYMPTOMATIC STENOSIS OF LEFT CAROTID ARTERY: ICD-10-CM

## 2023-08-09 PROCEDURE — 99024 POSTOP FOLLOW-UP VISIT: CPT | Performed by: SURGERY

## 2023-08-09 PROCEDURE — G0463 HOSPITAL OUTPT CLINIC VISIT: HCPCS | Performed by: SURGERY

## 2023-08-09 NOTE — PROGRESS NOTES
[facility]        Patient is here for a  follow up.      Pt is currently taking Aspirin and Statin.    BP (!) 166/82 (BP Location: Left arm, Patient Position: Chair, Cuff Size: Adult Regular)   Pulse 55     The provider has been notified that the patient has no concerns.     Questions patient would like addressed today are: N/A.    Refills are needed: N/A    Has homecare services and agency name:  Agustina Rivera MA

## 2023-08-09 NOTE — PROGRESS NOTES
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  He did well with that procedure and was discharged home on POD 1.  On his first postop check 2 weeks ago the inferior aspect of his incision was firm, slightly raised, and mildly erythematous.  I felt that he likely had a small underlying hematoma.  His neurologic exam was nonfocal.  He was given a 1 week course of Keflex.  He returns today for a 2-week follow-up recheck of the incision.    Apart from some persistent monica-incisional paresthesias he is otherwise without complaints.  He denies dysphagia.  He denies fever or chills.    Exam:  Well-developed male alert and oriented x 3.  Blood pressure 166/82 with a pulse of 55.  The left neck incision is much improved.      Left neck incision today.    ASSESSMENT:  1 month status post left carotid endarterectomy clinically doing well.  Probable small hematoma in the dependent aspect of the left neck incision which has largely resolved.  No dysphagia or tenderness.  Neurologic exam intact.    PLAN:  I reviewed all the above with Dash.  He will complete his 30-day course of Plavix.  Once that out he will continue on aspirin 81 mg daily.  Vascular surgical follow-up will be with me in 3 months for a routine postoperative left carotid ultrasound.    All of his questions were answered and he verbalizes full understanding to the above.    Gomez Amanda MD

## 2023-08-24 ENCOUNTER — OFFICE VISIT (OUTPATIENT)
Dept: URGENT CARE | Facility: URGENT CARE | Age: 73
End: 2023-08-24
Payer: COMMERCIAL

## 2023-08-24 ENCOUNTER — NURSE TRIAGE (OUTPATIENT)
Dept: FAMILY MEDICINE | Facility: CLINIC | Age: 73
End: 2023-08-24
Payer: COMMERCIAL

## 2023-08-24 VITALS
TEMPERATURE: 97.1 F | WEIGHT: 177 LBS | SYSTOLIC BLOOD PRESSURE: 154 MMHG | DIASTOLIC BLOOD PRESSURE: 76 MMHG | OXYGEN SATURATION: 97 % | BODY MASS INDEX: 26.91 KG/M2 | HEART RATE: 49 BPM

## 2023-08-24 DIAGNOSIS — M79.674 PAIN AND SWELLING OF TOE OF RIGHT FOOT: Primary | ICD-10-CM

## 2023-08-24 DIAGNOSIS — M79.89 PAIN AND SWELLING OF TOE OF RIGHT FOOT: Primary | ICD-10-CM

## 2023-08-24 PROCEDURE — 99213 OFFICE O/P EST LOW 20 MIN: CPT

## 2023-08-24 RX ORDER — PREDNISONE 20 MG/1
20 TABLET ORAL DAILY
Qty: 5 TABLET | Refills: 0 | Status: SHIPPED | OUTPATIENT
Start: 2023-08-24 | End: 2023-08-29

## 2023-08-24 RX ORDER — MELOXICAM 15 MG/1
15 TABLET ORAL DAILY
Qty: 30 TABLET | Refills: 0 | Status: SHIPPED | OUTPATIENT
Start: 2023-08-24 | End: 2024-07-01

## 2023-08-24 NOTE — PROGRESS NOTES
"URGENT CARE  Assessment & Plan   Assessment:   Bruno Baig is a 72 year old male who's clinical presentation today is consistent with:   1. Pain and swelling of toe of right foot  - meloxicam (MOBIC) 15 MG tablet;   - Orthopedic  Referral; Future  - Ankle/Foot Bracing Supplies DME Walking Boot; Right; Pneumatic; Short  - predniSONE (DELTASONE) 20 MG tablet;   Plan:  Patient refused x-rays today, stating \"it is not broken, I don't want xrays\", will treat patient at this time symptomatically and supportively, this will include encouraging: using NSAIDs/Tylenol to help decrease pain and inflammation, resting, applying ice/heat as needed, compression and elevation}  Patient supplied with walking boot/ brace today for increased joint pain and laxity-educated patient educated on removal during bedtime and bathing to assess for skin integrity and promote increased range of motion}   Educated patient to follow-up with their PCP or ortho in the next 1-2 weeks for further evaluation and reassessment, and due to the possibility of an occult fracture} also discussed to return immediatly  if symptoms worsen after today's visit.   No alarm signs or symptoms present   Differential Diagnoses for this patient's chief complaint that I considered include:  fracture, dislocation, Ligamentous vs tendon pathology, musculoskeletal injury, soft tissue injury     Patient is} agreeable to treatment plan and state they will follow-up if symptoms do not improve and/or if symptoms worsen   see patient's AVS 'monitor for' section for specific patient instructions given and discussed regarding what to watch for and when to follow up    ILENE Campbell [facility] [place]      ______________________________________________________________________      Subjective     HPI: Bruno Baig  is a 72 year old  male who presents today for evaluation the following concerns:   Patient presents endorsing right foot " "pain and swelling   Which started yesterday, 8/23/2023   patient states this pain is not  related to a traumatic injury/accident and is acute    patient states that they might have rolled their ankle but can't recall any injury    Patient localizes the pain to the top aspect of his foot , and states there is no radiation of the pain to the ankle    patient denies any associated} symptoms such as  redness or bruising   Patient states their: Skin is intact}. ROM is \"normal\", has full range}, and their strength is normal}   Patient reports sensation is without numbness or tingling.   Self care to this point includes: nothing , and the helpfulness was n/a   Patient refuses wanting an xray today, stating \"It is not broken\"       Review of Systems:  Pertinent review of systems as reflected in HPI, otherwise negative.     Objective    Physical Exam:  Vitals:    08/24/23 1124 08/24/23 1131   BP: (!) 171/70 (!) 154/76   Pulse: (!) 49    Temp: 97.1  F (36.2  C)    TempSrc: Tympanic    SpO2: 97%    Weight: 80.3 kg (177 lb)       General:   alert and oriented, no acute distress, non ill-appearing   Vital signs reviewed: afebrile and normotensive    Msk:   Right :foot: no  erythremia, ecchymosis   Moderate amount of  inflammation present on the dorsal aspect    Increased tenderness/pain with palpation on the dorsal aspect   No  decreased range of motion with dorsiflexion, plantar flexion, inversion, and  eversion.   Temperature equal} to body temperature. Neurovascularly intact distally with pulse +2. no crepitus, no gross deformity, and no laceration.  right ankle: No erythema, ecchymosis or bruising noted, but some mild inflammation  present on the lateral aspect   Temperature equal to body temperature.    No crepitus, no gross deformity, joint laxity,    skin intact, and no laceration(s) present.  Mild tenderness/pain with palpation and  full range of motion preserved with dorsiflexion, plantar flexion, inversion, and " eversion.  Neovascularity intact distally and pulse +2        ______________________________________________________________________    I explained my diagnostic considerations and recommendations to the patient, who voiced understanding and agreement with the treatment plan.   All questions were answered.   We discussed potential side effects, risks and benefits of any prescribed or recommended therapies, as well as expectations for response to treatments.  Please see AVS for any patient instructions & handouts given.   Patient was advised to contact the Nurse Care Line, their Primary Care provider, Urgent Care, or the Emergency Department if there are new or worsening symptoms, or call 911 for emergencies.

## 2023-08-24 NOTE — TELEPHONE ENCOUNTER
"Reason for Disposition    Numbness in one foot (i.e., loss of sensation)    Answer Assessment - Initial Assessment Questions  1. ONSET: \"When did the pain start?\"       Shooting pain in the right foot on the last smaller toes in the last 2-3 days.  2. LOCATION: \"Where is the pain located?\"   (e.g., around nail, entire toe, at foot joint)       Last pinky toe and one next to it on the right foot.  3. PAIN: \"How bad is the pain?\"    (Scale 1-10; or mild, moderate, severe)    -  MILD (1-3): doesn't interfere with normal activities     -  MODERATE (4-7): interferes with normal activities (e.g., work or school) or awakens from sleep, limping     -  SEVERE (8-10): excruciating pain, unable to do any normal activities, unable to walk      Moderate rates 6-7/10, says it comes and goes, he is able to walk  4. APPEARANCE: \"What does the toe look like?\" (e.g., redness, swelling, bruising, pallor)      Normal appearance   5. CAUSE: \"What do you think is causing the toe pain?\"      Patient has had history of gout before and thinks this is what it is  6. OTHER SYMPTOMS: \"Do you have any other symptoms?\" (e.g., leg pain, rash, fever, numbness)      Mild numbness in the toes, denies any other pain, no redness of fever, no other emergent symptoms.  7. PREGNANCY: \"Is there any chance you are pregnant?\" \"When was your last menstrual period?\"      na    Protocols used: Toe Pain-A-OH    "

## 2023-08-24 NOTE — TELEPHONE ENCOUNTER
Symptoms    Describe your symptoms: Pt says he he having pain in 2 toes in his R foot. History of Gout. He is wondering if he can get Rx    Any pain: Yes: Shooting pain that comes and goes    How long have you been having symptoms: 2-3  days    Have you been seen for this:  Yes: in the past has had Rx    Appointment offered?: No    Triage offered?: Yes:      Home remedies tried: Acetaminophen    Preferred Pharmacy:   [id number] [place] [address]  Phone: [phone] Fax: [phone]      Could we send this information to you in [place] or would you prefer to receive a phone call?:   Patient would prefer a phone call   Okay to leave a detailed message?: Yes at Home number on file [phone] (home)

## 2023-09-22 ENCOUNTER — DOCUMENTATION ONLY (OUTPATIENT)
Dept: FAMILY MEDICINE | Facility: CLINIC | Age: 73
End: 2023-09-22
Payer: COMMERCIAL

## 2023-09-22 DIAGNOSIS — R73.03 PRE-DIABETES: ICD-10-CM

## 2023-09-22 DIAGNOSIS — I10 BENIGN ESSENTIAL HYPERTENSION: Primary | ICD-10-CM

## 2023-09-22 NOTE — PROGRESS NOTES
Patient is coming for labs per Dr. Lopez, only order placed is a lipid panel not due until 12/2023.  Please place additional orders or contact patient.  Thanks!

## 2023-09-29 ENCOUNTER — LAB (OUTPATIENT)
Dept: LAB | Facility: CLINIC | Age: 73
End: 2023-09-29
Payer: COMMERCIAL

## 2023-09-29 DIAGNOSIS — I10 BENIGN ESSENTIAL HYPERTENSION: ICD-10-CM

## 2023-09-29 DIAGNOSIS — R73.03 PRE-DIABETES: ICD-10-CM

## 2023-09-29 LAB
ANION GAP SERPL CALCULATED.3IONS-SCNC: 8 MMOL/L (ref 7–15)
BUN SERPL-MCNC: 20.8 MG/DL (ref 8–23)
CALCIUM SERPL-MCNC: 10.2 MG/DL (ref 8.8–10.2)
CHLORIDE SERPL-SCNC: 104 MMOL/L (ref 98–107)
CHOLEST SERPL-MCNC: 136 MG/DL
CREAT SERPL-MCNC: 1.13 MG/DL (ref 0.67–1.17)
DEPRECATED HCO3 PLAS-SCNC: 25 MMOL/L (ref 22–29)
EGFRCR SERPLBLD CKD-EPI 2021: 68 ML/MIN/1.73M2
GLUCOSE SERPL-MCNC: 126 MG/DL (ref 70–99)
HBA1C MFR BLD: 6 % (ref 0–5.6)
HDLC SERPL-MCNC: 34 MG/DL
LDLC SERPL CALC-MCNC: 78 MG/DL
NONHDLC SERPL-MCNC: 102 MG/DL
POTASSIUM SERPL-SCNC: 5.1 MMOL/L (ref 3.4–5.3)
SODIUM SERPL-SCNC: 137 MMOL/L (ref 135–145)
TRIGL SERPL-MCNC: 121 MG/DL

## 2023-09-29 PROCEDURE — 80048 BASIC METABOLIC PNL TOTAL CA: CPT

## 2023-09-29 PROCEDURE — 36415 COLL VENOUS BLD VENIPUNCTURE: CPT

## 2023-09-29 PROCEDURE — 83036 HEMOGLOBIN GLYCOSYLATED A1C: CPT

## 2023-09-29 PROCEDURE — 80061 LIPID PANEL: CPT

## 2023-11-10 ENCOUNTER — OFFICE VISIT (OUTPATIENT)
Dept: OTHER | Facility: CLINIC | Age: 73
End: 2023-11-10
Attending: SURGERY
Payer: COMMERCIAL

## 2023-11-10 ENCOUNTER — HOSPITAL ENCOUNTER (OUTPATIENT)
Dept: ULTRASOUND IMAGING | Facility: CLINIC | Age: 73
Discharge: HOME | End: 2023-11-10
Attending: SURGERY
Payer: COMMERCIAL

## 2023-11-10 VITALS — OXYGEN SATURATION: 97 % | SYSTOLIC BLOOD PRESSURE: 157 MMHG | DIASTOLIC BLOOD PRESSURE: 80 MMHG | HEART RATE: 48 BPM

## 2023-11-10 DIAGNOSIS — I65.22 SYMPTOMATIC STENOSIS OF LEFT CAROTID ARTERY: ICD-10-CM

## 2023-11-10 DIAGNOSIS — Z98.890 HISTORY OF LEFT-SIDED CAROTID ENDARTERECTOMY: Primary | ICD-10-CM

## 2023-11-10 DIAGNOSIS — H34.232 RETINAL ARTERY OCCLUSION, BRANCH, LEFT: ICD-10-CM

## 2023-11-10 DIAGNOSIS — I71.40 ABDOMINAL AORTIC ANEURYSM (AAA) 3.0 CM TO 5.5 CM IN DIAMETER IN MALE (H): ICD-10-CM

## 2023-11-10 PROCEDURE — G0463 HOSPITAL OUTPT CLINIC VISIT: HCPCS | Mod: 25 | Performed by: SURGERY

## 2023-11-10 PROCEDURE — 99213 OFFICE O/P EST LOW 20 MIN: CPT | Performed by: SURGERY

## 2023-11-10 PROCEDURE — 93882 EXTRACRANIAL UNI/LTD STUDY: CPT | Mod: LT

## 2023-11-10 NOTE — PROGRESS NOTES
Patient is here to discuss follow up    BP (!) 157/80 (BP Location: Right arm, Patient Position: Chair, Cuff Size: Adult Large)   Pulse (!) 48   SpO2 97%     Questions patient would like addressed today are: N/A.    Refills are needed: No    Has homecare services and agency name:  Agustina MATTHEWS

## 2023-11-10 NOTE — PROGRESS NOTES
Bruon Baig is 4 months status post left carotid endarterectomy with bovine pericardial patch angioplasty performed for a symptomatic (left branch central retinal artery occlusion) 90% left carotid stenosis.  He returns today for his first postoperative imaging.  At early follow-up he had a slightly raised and erythematous inferior aspect of his incision.  I felt that he likely had a resolving underlying hematoma.  His neurologic exam remained nonfocal.  He was given a 1 week course of Keflex.  Follow-up to that showed improvement.    At today's visit he had no complaints.  He still has a persistent left eye visual deficit that will be permanent.  No other neurologic focality.    Exam:  Well-developed male alert and oriented x3.  Blood pressure 157/80 with a pulse of 48.  Well-healed left neck incision.  Neurologic exam nonfocal.    Imaging:  Ultrasound today demonstrates a widely patent left carotid artery.    ASSESSMENT:  1.  4-month status post symptomatic left carotid endarterectomy clinically doing well.    2.  4.5 cm infrarenal abdominal aortic aneurysm noted on ultrasound 6/29/2023.    PLAN:   He will continue his medical regimen which includes daily aspirin.  I have no carotid concerns.  Vascular surgical follow-up will be in 1 year with repeat bilateral carotid ultrasound and an AAA ultrasound.    Total length of this encounter was 20 minutes with time spent reviewing studies, interviewing and examining the patient, answering questions, and coordinating a treatment plan.    Gomez Amanda MD

## 2024-01-04 DIAGNOSIS — I10 BENIGN ESSENTIAL HYPERTENSION: ICD-10-CM

## 2024-01-04 DIAGNOSIS — I71.43 INFRARENAL ABDOMINAL AORTIC ANEURYSM (AAA) WITHOUT RUPTURE (H): ICD-10-CM

## 2024-01-04 DIAGNOSIS — I65.23 BILATERAL CAROTID ARTERY STENOSIS: ICD-10-CM

## 2024-01-04 RX ORDER — METOPROLOL TARTRATE 25 MG/1
25 TABLET, FILM COATED ORAL 2 TIMES DAILY
Qty: 180 TABLET | Refills: 1 | Status: SHIPPED | OUTPATIENT
Start: 2024-01-04 | End: 2024-06-26

## 2024-01-04 NOTE — TELEPHONE ENCOUNTER
Unable to refill per [facility] protocol.  Med and pharm loaded.  Routing to covering provider    Farida RODRIGUEZ RN    [facility]  Office: [phone]  Fax: [phone]

## 2024-05-03 DIAGNOSIS — E78.1 HYPERTRIGLYCERIDEMIA: ICD-10-CM

## 2024-05-03 RX ORDER — OMEGA-3-ACID ETHYL ESTERS 1 G/1
2 CAPSULE, LIQUID FILLED ORAL 2 TIMES DAILY
Qty: 360 CAPSULE | Refills: 1 | Status: SHIPPED | OUTPATIENT
Start: 2024-05-03

## 2024-05-03 NOTE — TELEPHONE ENCOUNTER
omega-3 acid ethyl esters (LOVAZA) 1 g capsule   Last Written Prescription Date:  7/11/23  Last Fill Quantity: 120,  # refills: 11     Last office visit: 7/11/23;  with Pearl Sanderson PA-C   Follow up Recommended: 3 months    Prescription approved per [facility] Refill Protocol.

## 2024-05-23 ENCOUNTER — PATIENT OUTREACH (OUTPATIENT)
Dept: CARE COORDINATION | Facility: CLINIC | Age: 74
End: 2024-05-23
Payer: COMMERCIAL

## 2024-06-17 PROBLEM — Z71.89 ADVANCED DIRECTIVES, COUNSELING/DISCUSSION: Status: RESOLVED | Noted: 2017-07-24 | Resolved: 2024-06-17

## 2024-06-21 DIAGNOSIS — M10.9 ACUTE GOUT, UNSPECIFIED CAUSE, UNSPECIFIED SITE: Primary | ICD-10-CM

## 2024-06-21 RX ORDER — INDOMETHACIN 25 MG/1
CAPSULE ORAL
Qty: 10 CAPSULE | Refills: 0 | Status: SHIPPED | OUTPATIENT
Start: 2024-06-21

## 2024-06-26 ENCOUNTER — OFFICE VISIT (OUTPATIENT)
Dept: FAMILY MEDICINE | Facility: CLINIC | Age: 74
End: 2024-06-26
Attending: FAMILY MEDICINE
Payer: COMMERCIAL

## 2024-06-26 VITALS
TEMPERATURE: 97.3 F | SYSTOLIC BLOOD PRESSURE: 126 MMHG | BODY MASS INDEX: 27.28 KG/M2 | DIASTOLIC BLOOD PRESSURE: 80 MMHG | HEART RATE: 53 BPM | HEIGHT: 68 IN | WEIGHT: 180 LBS | RESPIRATION RATE: 16 BRPM | OXYGEN SATURATION: 96 %

## 2024-06-26 DIAGNOSIS — N18.31 STAGE 3A CHRONIC KIDNEY DISEASE (H): ICD-10-CM

## 2024-06-26 DIAGNOSIS — E78.5 HYPERLIPIDEMIA LDL GOAL <100: ICD-10-CM

## 2024-06-26 DIAGNOSIS — I65.23 BILATERAL CAROTID ARTERY STENOSIS: ICD-10-CM

## 2024-06-26 DIAGNOSIS — I10 BENIGN ESSENTIAL HYPERTENSION: ICD-10-CM

## 2024-06-26 DIAGNOSIS — M10.9 ACUTE GOUT, UNSPECIFIED CAUSE, UNSPECIFIED SITE: ICD-10-CM

## 2024-06-26 DIAGNOSIS — I71.43 INFRARENAL ABDOMINAL AORTIC ANEURYSM (AAA) WITHOUT RUPTURE (H): ICD-10-CM

## 2024-06-26 DIAGNOSIS — R73.03 PRE-DIABETES: ICD-10-CM

## 2024-06-26 DIAGNOSIS — Z00.00 ENCOUNTER FOR MEDICARE ANNUAL WELLNESS EXAM: Primary | ICD-10-CM

## 2024-06-26 LAB
ALBUMIN SERPL BCG-MCNC: 4 G/DL (ref 3.5–5.2)
ALP SERPL-CCNC: 71 U/L (ref 40–150)
ALT SERPL W P-5'-P-CCNC: 16 U/L (ref 0–70)
ANION GAP SERPL CALCULATED.3IONS-SCNC: 14 MMOL/L (ref 7–15)
AST SERPL W P-5'-P-CCNC: 28 U/L (ref 0–45)
BASOPHILS # BLD AUTO: 0 10E3/UL (ref 0–0.2)
BASOPHILS NFR BLD AUTO: 0 %
BILIRUB SERPL-MCNC: 0.9 MG/DL
BUN SERPL-MCNC: 24 MG/DL (ref 8–23)
CALCIUM SERPL-MCNC: 10.7 MG/DL (ref 8.8–10.2)
CHLORIDE SERPL-SCNC: 102 MMOL/L (ref 98–107)
CHOLEST SERPL-MCNC: 146 MG/DL
CREAT SERPL-MCNC: 1.42 MG/DL (ref 0.67–1.17)
CREAT UR-MCNC: 363.9 MG/DL
DEPRECATED HCO3 PLAS-SCNC: 21 MMOL/L (ref 22–29)
EGFRCR SERPLBLD CKD-EPI 2021: 52 ML/MIN/1.73M2
EOSINOPHIL # BLD AUTO: 0.2 10E3/UL (ref 0–0.7)
EOSINOPHIL NFR BLD AUTO: 2 %
ERYTHROCYTE [DISTWIDTH] IN BLOOD BY AUTOMATED COUNT: 11.5 % (ref 10–15)
FASTING STATUS PATIENT QL REPORTED: ABNORMAL
FASTING STATUS PATIENT QL REPORTED: NORMAL
GLUCOSE SERPL-MCNC: 158 MG/DL (ref 70–99)
HBA1C MFR BLD: 5.7 % (ref 0–5.6)
HCT VFR BLD AUTO: 40.4 % (ref 40–53)
HDLC SERPL-MCNC: 45 MG/DL
HGB BLD-MCNC: 13.7 G/DL (ref 13.3–17.7)
IMM GRANULOCYTES # BLD: 0 10E3/UL
IMM GRANULOCYTES NFR BLD: 0 %
LDLC SERPL CALC-MCNC: 73 MG/DL
LYMPHOCYTES # BLD AUTO: 1.9 10E3/UL (ref 0.8–5.3)
LYMPHOCYTES NFR BLD AUTO: 22 %
MCH RBC QN AUTO: 32.1 PG (ref 26.5–33)
MCHC RBC AUTO-ENTMCNC: 33.9 G/DL (ref 31.5–36.5)
MCV RBC AUTO: 95 FL (ref 78–100)
MICROALBUMIN UR-MCNC: 39.8 MG/L
MICROALBUMIN/CREAT UR: 10.94 MG/G CR (ref 0–17)
MONOCYTES # BLD AUTO: 1 10E3/UL (ref 0–1.3)
MONOCYTES NFR BLD AUTO: 11 %
NEUTROPHILS # BLD AUTO: 5.4 10E3/UL (ref 1.6–8.3)
NEUTROPHILS NFR BLD AUTO: 64 %
NONHDLC SERPL-MCNC: 101 MG/DL
PLATELET # BLD AUTO: 249 10E3/UL (ref 150–450)
POTASSIUM SERPL-SCNC: 4.6 MMOL/L (ref 3.4–5.3)
PROT SERPL-MCNC: 7.6 G/DL (ref 6.4–8.3)
RBC # BLD AUTO: 4.27 10E6/UL (ref 4.4–5.9)
SODIUM SERPL-SCNC: 137 MMOL/L (ref 135–145)
TRIGL SERPL-MCNC: 141 MG/DL
URATE SERPL-MCNC: 9.9 MG/DL (ref 3.4–7)
WBC # BLD AUTO: 8.5 10E3/UL (ref 4–11)

## 2024-06-26 PROCEDURE — 82043 UR ALBUMIN QUANTITATIVE: CPT | Performed by: FAMILY MEDICINE

## 2024-06-26 PROCEDURE — 82570 ASSAY OF URINE CREATININE: CPT | Performed by: FAMILY MEDICINE

## 2024-06-26 PROCEDURE — 99214 OFFICE O/P EST MOD 30 MIN: CPT | Mod: 25 | Performed by: FAMILY MEDICINE

## 2024-06-26 PROCEDURE — 83036 HEMOGLOBIN GLYCOSYLATED A1C: CPT | Performed by: FAMILY MEDICINE

## 2024-06-26 PROCEDURE — 80053 COMPREHEN METABOLIC PANEL: CPT | Performed by: FAMILY MEDICINE

## 2024-06-26 PROCEDURE — 36415 COLL VENOUS BLD VENIPUNCTURE: CPT | Performed by: FAMILY MEDICINE

## 2024-06-26 PROCEDURE — 85025 COMPLETE CBC W/AUTO DIFF WBC: CPT | Performed by: FAMILY MEDICINE

## 2024-06-26 PROCEDURE — 80061 LIPID PANEL: CPT | Performed by: FAMILY MEDICINE

## 2024-06-26 PROCEDURE — 84550 ASSAY OF BLOOD/URIC ACID: CPT | Performed by: FAMILY MEDICINE

## 2024-06-26 PROCEDURE — G0439 PPPS, SUBSEQ VISIT: HCPCS | Performed by: FAMILY MEDICINE

## 2024-06-26 RX ORDER — RESPIRATORY SYNCYTIAL VIRUS VACCINE 120MCG/0.5
0.5 KIT INTRAMUSCULAR ONCE
Qty: 1 EACH | Refills: 0 | Status: CANCELLED | OUTPATIENT
Start: 2024-06-26 | End: 2024-06-26

## 2024-06-26 RX ORDER — AMLODIPINE BESYLATE 5 MG/1
5 TABLET ORAL DAILY
Qty: 90 TABLET | Refills: 3 | Status: SHIPPED | OUTPATIENT
Start: 2024-06-26

## 2024-06-26 RX ORDER — ATORVASTATIN CALCIUM 40 MG/1
80 TABLET, FILM COATED ORAL DAILY
Qty: 180 TABLET | Refills: 3 | Status: SHIPPED | OUTPATIENT
Start: 2024-06-26

## 2024-06-26 RX ORDER — METOPROLOL TARTRATE 25 MG/1
25 TABLET, FILM COATED ORAL 2 TIMES DAILY
Qty: 180 TABLET | Refills: 3 | Status: SHIPPED | OUTPATIENT
Start: 2024-06-26

## 2024-06-26 SDOH — HEALTH STABILITY: PHYSICAL HEALTH: ON AVERAGE, HOW MANY DAYS PER WEEK DO YOU ENGAGE IN MODERATE TO STRENUOUS EXERCISE (LIKE A BRISK WALK)?: 0 DAYS

## 2024-06-26 ASSESSMENT — SOCIAL DETERMINANTS OF HEALTH (SDOH)
HOW OFTEN DO YOU GET TOGETHER WITH FRIENDS OR RELATIVES?: TWICE A WEEK
HOW OFTEN DO YOU GET TOGETHER WITH FRIENDS OR RELATIVES?: TWICE A WEEK

## 2024-06-26 ASSESSMENT — PAIN SCALES - GENERAL: PAINLEVEL: MILD PAIN (2)

## 2024-06-26 NOTE — PROGRESS NOTES
Preventive Care Visit  [facility]  Charisse Lopez MD, Family Medicine  Jun 26, 2024      Assessment & Plan     Encounter for Medicare annual wellness exam    - PRIMARY CARE FOLLOW-UP SCHEDULING    Stage 3a chronic kidney disease (H)  Adjust meds as indicated by above labs.   - Albumin Random Urine Quantitative with Creat Ratio; Future  - Albumin Random Urine Quantitative with Creat Ratio    Infrarenal abdominal aortic aneurysm (AAA) without rupture (H24)  Stable no change in treatment plan.   Following with vascular   - metoprolol tartrate (LOPRESSOR) 25 MG tablet; Take 1 tablet (25 mg) by mouth 2 times daily    Benign essential hypertension  Stable no change in treatment plan.   - amLODIPine (NORVASC) 5 MG tablet; Take 1 tablet (5 mg) by mouth daily  - metoprolol tartrate (LOPRESSOR) 25 MG tablet; Take 1 tablet (25 mg) by mouth 2 times daily  - CBC with Platelets & Differential; Future  - Comprehensive metabolic panel; Future  - Comprehensive metabolic panel  - CBC with Platelets & Differential    Bilateral carotid artery stenosis  Stable no change in treatment plan.   Following with vascular   - metoprolol tartrate (LOPRESSOR) 25 MG tablet; Take 1 tablet (25 mg) by mouth 2 times daily    Hyperlipidemia LDL goal <100  Adjust meds as indicated by above labs.   - atorvastatin (LIPITOR) 40 MG tablet; Take 2 tablets (80 mg) by mouth daily  - Lipid panel reflex to direct LDL Fasting; Future  - Lipid panel reflex to direct LDL Fasting    Acute gout, unspecified cause, unspecified site  Adjust meds as indicated by above labs.   - Uric acid; Future  - Uric acid    Pre-diabetes  Reviewed diet   - Hemoglobin A1c; Future  - Hemoglobin A1c    Patient has been advised of split billing requirements and indicates understanding: Yes        Nicotine/Tobacco Cessation  He reports that he has been smoking cigarettes. He started smoking about 31 years ago. He has a 15 pack-year smoking history. He has  "never used smokeless tobacco.  Nicotine/Tobacco Cessation Plan  Information offered: Patient not interested at this time      BMI  Estimated body mass index is 27.37 kg/m  as calculated from the following:    Height as of this encounter: 1.727 m (5' 8\").    Weight as of this encounter: 81.6 kg (180 lb).   Weight management plan: Discussed healthy diet and exercise guidelines    Counseling  Appropriate preventive services were discussed with this patient, including applicable screening as appropriate for fall prevention, nutrition, physical activity, Tobacco-use cessation, weight loss and cognition.  Checklist reviewing preventive services available has been given to the patient.  Reviewed patient's diet, addressing concerns and/or questions.   Patient reported safety concerns were addressed today.The patient was provided with written information regarding signs of hearing loss.           Jackie Carr is a 73 year old, presenting for the following:  Physical        6/26/2024    10:50 AM   Additional Questions   Roomed by [facility] Directive  Patient does not have a Health Care Directive or Living Will: Advance Directive received and scanned. Click on Code in the patient header to view.    HPI      Hyperlipidemia Follow-Up    Are you regularly taking any medication or supplement to lower your cholesterol?   Yes- Atorvastatin  Are you having muscle aches or other side effects that you think could be caused by your cholesterol lowering medication?  No    Hypertension Follow-up    Do you check your blood pressure regularly outside of the clinic? No   Are you following a low salt diet? No    Vascular Disease Follow-up    How often do you take nitroglycerin? Never  Do you take an aspirin every day? Yes    Chronic Kidney Disease Follow-up    Do you take any over the counter pain medicine?: Yes  What over the counter medicine are you taking for your pain?:  ibuprofen     How often do you take this " medicine?:  for gout flares 2 times per year     Gout flare was better with ibuprofen and did not use the indomethacin   Not on prev med      6/26/2024   General Health   How would you rate your overall physical health? Good   Feel stress (tense, anxious, or unable to sleep) Not at all            6/26/2024   Nutrition   Diet: Regular (no restrictions)            6/26/2024   Exercise   Days per week of moderate/strenous exercise 0 days      (!) EXERCISE CONCERN      6/26/2024   Social Factors   Frequency of gathering with friends or relatives Twice a week   Worry food won't last until get money to buy more No   Food not last or not have enough money for food? No   Do you have housing? (Housing is defined as stable permanent housing and does not include staying ouside in a car, in a tent, in an abandoned building, in an overnight shelter, or couch-surfing.) Yes   Are you worried about losing your housing? No   Lack of transportation? No   Unable to get utilities (heat,electricity)? No            6/26/2024   Fall Risk   Fallen 2 or more times in the past year? No    No   Trouble with walking or balance? No    No       Multiple values from one day are sorted in reverse-chronological order          6/26/2024   Activities of Daily Living- Home Safety   Needs help with the following daily activites None of the above   Safety concerns in the home No grab bars in the bathroom            6/26/2024   Dental   Dentist two times every year? Yes            6/26/2024   Hearing Screening   Hearing concerns? (!) IT'S HARD TO FOLLOW A CONVERSATION IN A NOISY RESTAURANT OR CROWDED ROOM.            6/26/2024   Driving Risk Screening   Patient/family members have concerns about driving No            6/26/2024   General Alertness/Fatigue Screening   Have you been more tired than usual lately? No            6/26/2024   Urinary Incontinence Screening   Bothered by leaking urine in past 6 months No            6/26/2024   TB Screening   Were  you born outside of the [place]? No            Today's PHQ-2 Score:       2024    10:45 AM   PHQ-2 (  [employer])   Q1: Little interest or pleasure in doing things 0   Q2: Feeling down, depressed or hopeless 0   PHQ-2 Score 0   Q1: Little interest or pleasure in doing things Not at all   Q2: Feeling down, depressed or hopeless Not at all   PHQ-2 Score 0           2024   Substance Use   Alcohol more than 3/day or more than 7/wk Not Applicable   Do you have a current opioid prescription? No   How severe/bad is pain from 1 to 10? 0/10 (No Pain)   Do you use any other substances recreationally? No        Social History     Tobacco Use    Smoking status: Some Days     Current packs/day: 0.00     Average packs/day: 0.5 packs/day for 30.0 years (15.0 ttl pk-yrs)     Types: Cigarettes     Start date: 1993     Last attempt to quit: 2023     Years since quittin.9    Smokeless tobacco: Never    Tobacco comments:     1/2 pack daily    Vaping Use    Vaping status: Never Used   Substance Use Topics    Alcohol use: Yes     Comment: 1-2 daily    Drug use: No       ASCVD Risk   The 10-year ASCVD risk score (Liya DK, et al., 2019) is: 29.8%    Values used to calculate the score:      Age: 73 years      Sex: Male      Is Non- : No      Diabetic: No      Tobacco smoker: Yes      Systolic Blood Pressure: 126 mmHg      Is BP treated: Yes      HDL Cholesterol: 34 mg/dL      Total Cholesterol: 136 mg/dL            Reviewed and updated as needed this visit by Provider                      Current providers sharing in care for this patient include:  Patient Care Team:  Charisse Lopez MD as PCP - General (Family Practice)  Charisse Lopez MD as Assigned PCP  Enzo Amanda MD as Assigned Heart and Vascular Provider    The following health maintenance items are reviewed in Epic and correct as of today:  Health Maintenance   Topic Date Due    DTAP/TDAP/TD IMMUNIZATION (1 -  "Tdap) Never done    RSV VACCINE (Pregnancy & 60+) (1 - 1-dose 60+ series) Never done    COVID-19 Vaccine (5 - 2023-24 season) 03/07/2024    MICROALBUMIN  06/20/2024    ANNUAL REVIEW OF HM ORDERS  06/20/2024    NICOTINE/TOBACCO CESSATION COUNSELING Q 1 YR  06/20/2024    MEDICARE ANNUAL WELLNESS VISIT  06/20/2024    LUNG CANCER SCREENING  06/29/2024    HEMOGLOBIN  07/12/2024    BMP  09/29/2024    LIPID  09/29/2024    FALL RISK ASSESSMENT  06/26/2025    GLUCOSE  09/29/2026    COLORECTAL CANCER SCREENING  11/09/2027    ADVANCE CARE PLANNING  06/20/2028    HEPATITIS C SCREENING  Completed    PHQ-2 (once per calendar year)  Completed    INFLUENZA VACCINE  Completed    Pneumococcal Vaccine: 65+ Years  Completed    URINALYSIS  Completed    ZOSTER IMMUNIZATION  Completed    AORTIC ANEURYSM SCREENING (SYSTEM ASSIGNED)  Completed    IPV IMMUNIZATION  Aged Out    HPV IMMUNIZATION  Aged Out    MENINGITIS IMMUNIZATION  Aged Out    RSV MONOCLONAL ANTIBODY  Aged Out            Objective    Exam  /80 (BP Location: Right arm)   Pulse 53   Temp 97.3  F (36.3  C) (Tympanic)   Resp 16   Ht 1.727 m (5' 8\")   Wt 81.6 kg (180 lb)   SpO2 96%   BMI 27.37 kg/m     Estimated body mass index is 27.37 kg/m  as calculated from the following:    Height as of this encounter: 1.727 m (5' 8\").    Weight as of this encounter: 81.6 kg (180 lb).    Physical Exam          6/26/2024   Mini Cog   Clock Draw Score 2 Normal   3 Item Recall 3 objects recalled   Mini Cog Total Score 5                 Signed Electronically by: Charisse Lopez MD    "

## 2024-07-03 ENCOUNTER — LAB (OUTPATIENT)
Dept: LAB | Facility: CLINIC | Age: 74
End: 2024-07-03
Attending: FAMILY MEDICINE
Payer: COMMERCIAL

## 2024-07-03 DIAGNOSIS — M10.9 ACUTE GOUT, UNSPECIFIED CAUSE, UNSPECIFIED SITE: ICD-10-CM

## 2024-07-03 DIAGNOSIS — F17.219 CIGARETTE NICOTINE DEPENDENCE WITH NICOTINE-INDUCED DISORDER: Primary | ICD-10-CM

## 2024-07-03 DIAGNOSIS — Z13.6 SCREENING FOR AAA (ABDOMINAL AORTIC ANEURYSM): ICD-10-CM

## 2024-07-03 DIAGNOSIS — N18.31 STAGE 3A CHRONIC KIDNEY DISEASE (H): ICD-10-CM

## 2024-07-03 LAB
ANION GAP SERPL CALCULATED.3IONS-SCNC: 13 MMOL/L (ref 7–15)
BUN SERPL-MCNC: 18 MG/DL (ref 8–23)
CALCIUM SERPL-MCNC: 10.2 MG/DL (ref 8.8–10.2)
CHLORIDE SERPL-SCNC: 105 MMOL/L (ref 98–107)
CREAT SERPL-MCNC: 1.4 MG/DL (ref 0.67–1.17)
DEPRECATED HCO3 PLAS-SCNC: 22 MMOL/L (ref 22–29)
EGFRCR SERPLBLD CKD-EPI 2021: 52 ML/MIN/1.73M2
GLUCOSE SERPL-MCNC: 116 MG/DL (ref 70–99)
POTASSIUM SERPL-SCNC: 4.5 MMOL/L (ref 3.4–5.3)
SODIUM SERPL-SCNC: 140 MMOL/L (ref 135–145)
URATE SERPL-MCNC: 8.8 MG/DL (ref 3.4–7)

## 2024-07-03 PROCEDURE — 84550 ASSAY OF BLOOD/URIC ACID: CPT

## 2024-07-03 PROCEDURE — 80048 BASIC METABOLIC PNL TOTAL CA: CPT

## 2024-07-03 PROCEDURE — 36415 COLL VENOUS BLD VENIPUNCTURE: CPT

## 2024-07-17 ENCOUNTER — HOSPITAL ENCOUNTER (OUTPATIENT)
Dept: ULTRASOUND IMAGING | Facility: CLINIC | Age: 74
Discharge: HOME | End: 2024-07-17
Attending: FAMILY MEDICINE
Payer: COMMERCIAL

## 2024-07-17 ENCOUNTER — HOSPITAL ENCOUNTER (OUTPATIENT)
Dept: CT IMAGING | Facility: CLINIC | Age: 74
Discharge: HOME | End: 2024-07-17
Attending: FAMILY MEDICINE
Payer: COMMERCIAL

## 2024-07-17 DIAGNOSIS — Z13.6 SCREENING FOR AAA (ABDOMINAL AORTIC ANEURYSM): ICD-10-CM

## 2024-07-17 DIAGNOSIS — F17.219 CIGARETTE NICOTINE DEPENDENCE WITH NICOTINE-INDUCED DISORDER: ICD-10-CM

## 2024-07-17 PROCEDURE — 71271 CT THORAX LUNG CANCER SCR C-: CPT

## 2024-07-17 PROCEDURE — 76706 US ABDL AORTA SCREEN AAA: CPT

## 2024-07-22 ENCOUNTER — LAB (OUTPATIENT)
Dept: LAB | Facility: CLINIC | Age: 74
End: 2024-07-22
Payer: COMMERCIAL

## 2024-07-22 DIAGNOSIS — N18.31 STAGE 3A CHRONIC KIDNEY DISEASE (H): ICD-10-CM

## 2024-07-22 DIAGNOSIS — M10.9 ACUTE GOUT, UNSPECIFIED CAUSE, UNSPECIFIED SITE: ICD-10-CM

## 2024-07-22 LAB
ANION GAP SERPL CALCULATED.3IONS-SCNC: 9 MMOL/L (ref 7–15)
BUN SERPL-MCNC: 14.9 MG/DL (ref 8–23)
CALCIUM SERPL-MCNC: 10.1 MG/DL (ref 8.8–10.4)
CHLORIDE SERPL-SCNC: 105 MMOL/L (ref 98–107)
CREAT SERPL-MCNC: 1.1 MG/DL (ref 0.67–1.17)
EGFRCR SERPLBLD CKD-EPI 2021: 70 ML/MIN/1.73M2
GLUCOSE SERPL-MCNC: 124 MG/DL (ref 70–99)
HCO3 SERPL-SCNC: 25 MMOL/L (ref 22–29)
POTASSIUM SERPL-SCNC: 4.4 MMOL/L (ref 3.4–5.3)
SODIUM SERPL-SCNC: 139 MMOL/L (ref 135–145)
URATE SERPL-MCNC: 8.3 MG/DL (ref 3.4–7)

## 2024-07-22 PROCEDURE — 80048 BASIC METABOLIC PNL TOTAL CA: CPT

## 2024-07-22 PROCEDURE — 84550 ASSAY OF BLOOD/URIC ACID: CPT

## 2024-07-22 PROCEDURE — 36415 COLL VENOUS BLD VENIPUNCTURE: CPT

## 2024-07-29 ENCOUNTER — TELEPHONE (OUTPATIENT)
Dept: FAMILY MEDICINE | Facility: CLINIC | Age: 74
End: 2024-07-29
Payer: COMMERCIAL

## 2024-07-29 DIAGNOSIS — M10.9 ACUTE GOUT, UNSPECIFIED CAUSE, UNSPECIFIED SITE: Primary | ICD-10-CM

## 2024-07-29 DIAGNOSIS — M1A.9XX0 CHRONIC GOUT WITHOUT TOPHUS, UNSPECIFIED CAUSE, UNSPECIFIED SITE: ICD-10-CM

## 2024-07-29 RX ORDER — ALLOPURINOL 100 MG/1
50 TABLET ORAL DAILY
Qty: 45 TABLET | Refills: 0 | Status: SHIPPED | OUTPATIENT
Start: 2024-07-29

## 2024-07-29 NOTE — TELEPHONE ENCOUNTER
Bruno calls asking me to let Dr Lopez know that he is willing to try the allopurinol. Thank you!    Tala Corona RN

## 2024-07-29 NOTE — TELEPHONE ENCOUNTER
Rx sent to pharmacy for him   1/2 tab daily   Be sure he is not having acute gout flare now   He should have labs done in one month and they are ordered

## 2024-07-31 NOTE — TELEPHONE ENCOUNTER
Patient states that he may having a flare right now for the last few weeks - His right foot is swollen and tender/hard to walk.     He states he started taking medication. Advised of labs and he verbalizes understanding.    Alma Menezes RN on 7/31/2024 at 5:06 PM

## 2024-08-01 NOTE — TELEPHONE ENCOUNTER
Patient verbalized understanding and repeated back what he should do for next 4 days. He will let us know if he is still having same pain next week.  Alma Menezes RN on 8/1/2024 at 10:54 AM

## 2024-08-01 NOTE — TELEPHONE ENCOUNTER
If he is having a flare he should NOT be taking the allopurinol.   He can use ibuprofen for 3-4 days only and hydrating with this   He needs to NOT be in a flare when he starts the allopurinol

## 2024-08-01 NOTE — TELEPHONE ENCOUNTER
600mg three times a day for 3-4 days only   Take with food and be sure he is drinking 48 oz water per day with this to protect his kidneys   If the foot is not better he should let me know next week

## 2024-08-01 NOTE — TELEPHONE ENCOUNTER
How many tablets/mg a day for this?  He verbalized understanding not Allopurinol.      Alma Menezes RN on 8/1/2024 at 10:40 AM

## 2024-08-28 ENCOUNTER — LAB (OUTPATIENT)
Dept: LAB | Facility: CLINIC | Age: 74
End: 2024-08-28
Payer: COMMERCIAL

## 2024-08-28 DIAGNOSIS — M1A.9XX0 CHRONIC GOUT WITHOUT TOPHUS, UNSPECIFIED CAUSE, UNSPECIFIED SITE: ICD-10-CM

## 2024-08-28 LAB
ANION GAP SERPL CALCULATED.3IONS-SCNC: 6 MMOL/L (ref 7–15)
BUN SERPL-MCNC: 14.9 MG/DL (ref 8–23)
CALCIUM SERPL-MCNC: 10.3 MG/DL (ref 8.8–10.4)
CHLORIDE SERPL-SCNC: 105 MMOL/L (ref 98–107)
CREAT SERPL-MCNC: 1.18 MG/DL (ref 0.67–1.17)
EGFRCR SERPLBLD CKD-EPI 2021: 64 ML/MIN/1.73M2
GLUCOSE SERPL-MCNC: 121 MG/DL (ref 70–99)
HCO3 SERPL-SCNC: 28 MMOL/L (ref 22–29)
POTASSIUM SERPL-SCNC: 4.8 MMOL/L (ref 3.4–5.3)
SODIUM SERPL-SCNC: 139 MMOL/L (ref 135–145)
URATE SERPL-MCNC: 7.6 MG/DL (ref 3.4–7)

## 2024-08-28 PROCEDURE — 84550 ASSAY OF BLOOD/URIC ACID: CPT

## 2024-08-28 PROCEDURE — 36415 COLL VENOUS BLD VENIPUNCTURE: CPT

## 2024-08-28 PROCEDURE — 80048 BASIC METABOLIC PNL TOTAL CA: CPT

## 2024-10-21 DIAGNOSIS — M1A.9XX0 CHRONIC GOUT WITHOUT TOPHUS, UNSPECIFIED CAUSE, UNSPECIFIED SITE: ICD-10-CM

## 2024-10-21 RX ORDER — ALLOPURINOL 100 MG/1
TABLET ORAL
Qty: 45 TABLET | Refills: 0 | Status: SHIPPED | OUTPATIENT
Start: 2024-10-21

## 2024-11-11 ENCOUNTER — DOCUMENTATION ONLY (OUTPATIENT)
Dept: FAMILY MEDICINE | Facility: CLINIC | Age: 74
End: 2024-11-11
Payer: COMMERCIAL

## 2024-11-11 DIAGNOSIS — M10.9 ACUTE GOUT, UNSPECIFIED CAUSE, UNSPECIFIED SITE: Primary | ICD-10-CM

## 2024-11-11 NOTE — PROGRESS NOTES
Bruno Baig has an upcoming lab appointment:    Future Appointments   Date Time Provider Department Center   11/20/2024  9:30 AM NB LAB [facility]   12/6/2024  9:15 AM [facility]   12/6/2024 10:50 AM Enzo Amanda MD [facility]     Patient is scheduled for the following lab(s): uric acid level    There is no order available. Please review and place either future orders or HMPO (Review of Health Maintenance Protocol Orders), as appropriate.    There are no preventive care reminders to display for this patient.  Alea Way

## 2024-11-20 ENCOUNTER — LAB (OUTPATIENT)
Dept: LAB | Facility: CLINIC | Age: 74
End: 2024-11-20
Payer: COMMERCIAL

## 2024-11-20 DIAGNOSIS — M10.9 ACUTE GOUT, UNSPECIFIED CAUSE, UNSPECIFIED SITE: ICD-10-CM

## 2024-11-20 LAB
ANION GAP SERPL CALCULATED.3IONS-SCNC: 10 MMOL/L (ref 7–15)
BUN SERPL-MCNC: 18.1 MG/DL (ref 8–23)
CALCIUM SERPL-MCNC: 10.3 MG/DL (ref 8.8–10.4)
CHLORIDE SERPL-SCNC: 103 MMOL/L (ref 98–107)
CREAT SERPL-MCNC: 1.21 MG/DL (ref 0.67–1.17)
EGFRCR SERPLBLD CKD-EPI 2021: 62 ML/MIN/1.73M2
GLUCOSE SERPL-MCNC: 122 MG/DL (ref 70–99)
HCO3 SERPL-SCNC: 27 MMOL/L (ref 22–29)
POTASSIUM SERPL-SCNC: 4.7 MMOL/L (ref 3.4–5.3)
SODIUM SERPL-SCNC: 140 MMOL/L (ref 135–145)
URATE SERPL-MCNC: 6.9 MG/DL (ref 3.4–7)

## 2024-11-20 PROCEDURE — 80048 BASIC METABOLIC PNL TOTAL CA: CPT

## 2024-11-20 PROCEDURE — 36415 COLL VENOUS BLD VENIPUNCTURE: CPT

## 2024-11-20 PROCEDURE — 84550 ASSAY OF BLOOD/URIC ACID: CPT

## 2024-12-06 ENCOUNTER — HOSPITAL ENCOUNTER (OUTPATIENT)
Dept: ULTRASOUND IMAGING | Facility: CLINIC | Age: 74
Discharge: HOME | End: 2024-12-06
Attending: SURGERY
Payer: COMMERCIAL

## 2024-12-06 DIAGNOSIS — I71.40 ABDOMINAL AORTIC ANEURYSM (AAA) 3.0 CM TO 5.5 CM IN DIAMETER IN MALE (H): ICD-10-CM

## 2024-12-06 DIAGNOSIS — Z98.890 HISTORY OF LEFT-SIDED CAROTID ENDARTERECTOMY: ICD-10-CM

## 2024-12-06 DIAGNOSIS — H34.232 RETINAL ARTERY OCCLUSION, BRANCH, LEFT: ICD-10-CM

## 2024-12-06 PROCEDURE — 93978 VASCULAR STUDY: CPT

## 2024-12-06 PROCEDURE — 93880 EXTRACRANIAL BILAT STUDY: CPT

## 2025-01-15 ENCOUNTER — TELEPHONE (OUTPATIENT)
Dept: FAMILY MEDICINE | Facility: CLINIC | Age: 75
End: 2025-01-15

## 2025-01-15 DIAGNOSIS — M1A.9XX0 CHRONIC GOUT WITHOUT TOPHUS, UNSPECIFIED CAUSE, UNSPECIFIED SITE: ICD-10-CM

## 2025-01-15 RX ORDER — ALLOPURINOL 100 MG/1
TABLET ORAL
Qty: 45 TABLET | Refills: 0 | OUTPATIENT
Start: 2025-01-15

## 2025-01-15 NOTE — TELEPHONE ENCOUNTER
Called and spoke with pt. Appt scheduled for 2/18 with Dr. Lopez. Pt will be out of medication before appt, wondering if bridge refill can be sent.    Adriana Steen Patient

## 2025-01-15 NOTE — TELEPHONE ENCOUNTER
Overdue for needed care. Please call to schedule. Once appt is scheduled, route back to the pool    Julie Behrendt RN

## 2025-01-16 RX ORDER — ALLOPURINOL 100 MG/1
50 TABLET ORAL DAILY
Qty: 45 TABLET | Refills: 0 | Status: SHIPPED | OUTPATIENT
Start: 2025-01-16

## 2025-02-18 ENCOUNTER — OFFICE VISIT (OUTPATIENT)
Dept: FAMILY MEDICINE | Facility: CLINIC | Age: 75
End: 2025-02-18
Payer: COMMERCIAL

## 2025-02-18 ENCOUNTER — ANCILLARY PROCEDURE (OUTPATIENT)
Dept: GENERAL RADIOLOGY | Facility: CLINIC | Age: 75
End: 2025-02-18
Attending: FAMILY MEDICINE
Payer: COMMERCIAL

## 2025-02-18 VITALS
HEART RATE: 69 BPM | HEIGHT: 68 IN | TEMPERATURE: 97 F | WEIGHT: 182 LBS | OXYGEN SATURATION: 97 % | RESPIRATION RATE: 14 BRPM | BODY MASS INDEX: 27.58 KG/M2 | SYSTOLIC BLOOD PRESSURE: 160 MMHG | DIASTOLIC BLOOD PRESSURE: 78 MMHG

## 2025-02-18 DIAGNOSIS — I10 BENIGN ESSENTIAL HYPERTENSION: ICD-10-CM

## 2025-02-18 DIAGNOSIS — D69.1 QUALITATIVE PLATELET DEFECTS (H): ICD-10-CM

## 2025-02-18 DIAGNOSIS — E78.5 HYPERLIPIDEMIA LDL GOAL <100: ICD-10-CM

## 2025-02-18 DIAGNOSIS — R05.2 SUBACUTE COUGH: ICD-10-CM

## 2025-02-18 DIAGNOSIS — F17.219 CIGARETTE NICOTINE DEPENDENCE WITH NICOTINE-INDUCED DISORDER: ICD-10-CM

## 2025-02-18 DIAGNOSIS — M1A.9XX0 CHRONIC GOUT WITHOUT TOPHUS, UNSPECIFIED CAUSE, UNSPECIFIED SITE: ICD-10-CM

## 2025-02-18 DIAGNOSIS — E78.1 HYPERTRIGLYCERIDEMIA: ICD-10-CM

## 2025-02-18 DIAGNOSIS — N18.31 STAGE 3A CHRONIC KIDNEY DISEASE (H): Primary | ICD-10-CM

## 2025-02-18 DIAGNOSIS — R73.03 PREDIABETES: ICD-10-CM

## 2025-02-18 LAB
ALBUMIN SERPL BCG-MCNC: 4.4 G/DL (ref 3.5–5.2)
ALP SERPL-CCNC: 89 U/L (ref 40–150)
ALT SERPL W P-5'-P-CCNC: 27 U/L (ref 0–70)
ANION GAP SERPL CALCULATED.3IONS-SCNC: 11 MMOL/L (ref 7–15)
AST SERPL W P-5'-P-CCNC: 36 U/L (ref 0–45)
BILIRUB SERPL-MCNC: 0.9 MG/DL
BUN SERPL-MCNC: 19.9 MG/DL (ref 8–23)
CALCIUM SERPL-MCNC: 10.7 MG/DL (ref 8.8–10.4)
CHLORIDE SERPL-SCNC: 104 MMOL/L (ref 98–107)
CHOLEST SERPL-MCNC: 169 MG/DL
CREAT SERPL-MCNC: 1.38 MG/DL (ref 0.67–1.17)
EGFRCR SERPLBLD CKD-EPI 2021: 53 ML/MIN/1.73M2
ERYTHROCYTE [DISTWIDTH] IN BLOOD BY AUTOMATED COUNT: 12 % (ref 10–15)
EST. AVERAGE GLUCOSE BLD GHB EST-MCNC: 131 MG/DL
FASTING STATUS PATIENT QL REPORTED: YES
FASTING STATUS PATIENT QL REPORTED: YES
GLUCOSE SERPL-MCNC: 128 MG/DL (ref 70–99)
HBA1C MFR BLD: 6.2 % (ref 0–5.6)
HCO3 SERPL-SCNC: 26 MMOL/L (ref 22–29)
HCT VFR BLD AUTO: 41 % (ref 40–53)
HDLC SERPL-MCNC: 43 MG/DL
HGB BLD-MCNC: 14.1 G/DL (ref 13.3–17.7)
LDLC SERPL CALC-MCNC: 64 MG/DL
MCH RBC QN AUTO: 32 PG (ref 26.5–33)
MCHC RBC AUTO-ENTMCNC: 34.4 G/DL (ref 31.5–36.5)
MCV RBC AUTO: 93 FL (ref 78–100)
NONHDLC SERPL-MCNC: 126 MG/DL
PLATELET # BLD AUTO: 249 10E3/UL (ref 150–450)
POTASSIUM SERPL-SCNC: 4.6 MMOL/L (ref 3.4–5.3)
PROT SERPL-MCNC: 7.7 G/DL (ref 6.4–8.3)
RBC # BLD AUTO: 4.4 10E6/UL (ref 4.4–5.9)
SODIUM SERPL-SCNC: 141 MMOL/L (ref 135–145)
TRIGL SERPL-MCNC: 309 MG/DL
URATE SERPL-MCNC: 7.2 MG/DL (ref 3.4–7)
WBC # BLD AUTO: 14 10E3/UL (ref 4–11)

## 2025-02-18 PROCEDURE — G2211 COMPLEX E/M VISIT ADD ON: HCPCS | Performed by: FAMILY MEDICINE

## 2025-02-18 PROCEDURE — 84550 ASSAY OF BLOOD/URIC ACID: CPT | Performed by: FAMILY MEDICINE

## 2025-02-18 PROCEDURE — 80053 COMPREHEN METABOLIC PANEL: CPT | Performed by: FAMILY MEDICINE

## 2025-02-18 PROCEDURE — 99214 OFFICE O/P EST MOD 30 MIN: CPT | Performed by: FAMILY MEDICINE

## 2025-02-18 PROCEDURE — 83036 HEMOGLOBIN GLYCOSYLATED A1C: CPT | Performed by: FAMILY MEDICINE

## 2025-02-18 PROCEDURE — 71046 X-RAY EXAM CHEST 2 VIEWS: CPT | Mod: TC | Performed by: RADIOLOGY

## 2025-02-18 PROCEDURE — 85027 COMPLETE CBC AUTOMATED: CPT | Performed by: FAMILY MEDICINE

## 2025-02-18 PROCEDURE — 36415 COLL VENOUS BLD VENIPUNCTURE: CPT | Performed by: FAMILY MEDICINE

## 2025-02-18 PROCEDURE — 80061 LIPID PANEL: CPT | Performed by: FAMILY MEDICINE

## 2025-02-18 RX ORDER — ATORVASTATIN CALCIUM 40 MG/1
80 TABLET, FILM COATED ORAL DAILY
Qty: 180 TABLET | Refills: 3 | Status: SHIPPED | OUTPATIENT
Start: 2025-02-18

## 2025-02-18 RX ORDER — DOXYCYCLINE 100 MG/1
100 CAPSULE ORAL 2 TIMES DAILY
Qty: 20 CAPSULE | Refills: 0 | Status: SHIPPED | OUTPATIENT
Start: 2025-02-18

## 2025-02-18 RX ORDER — ALLOPURINOL 100 MG/1
50 TABLET ORAL DAILY
Qty: 45 TABLET | Refills: 3 | Status: SHIPPED | OUTPATIENT
Start: 2025-02-18

## 2025-02-18 RX ORDER — ATORVASTATIN CALCIUM 80 MG/1
1 TABLET, FILM COATED ORAL
COMMUNITY
Start: 2024-04-05 | End: 2025-02-18 | Stop reason: ALTCHOICE

## 2025-02-18 RX ORDER — OMEGA-3-ACID ETHYL ESTERS 1 G/1
2 CAPSULE, LIQUID FILLED ORAL 2 TIMES DAILY
Qty: 360 CAPSULE | Refills: 3 | Status: SHIPPED | OUTPATIENT
Start: 2025-02-18

## 2025-02-18 RX ORDER — AMLODIPINE BESYLATE 5 MG/1
5 TABLET ORAL DAILY
Qty: 90 TABLET | Refills: 3 | Status: SHIPPED | OUTPATIENT
Start: 2025-02-18

## 2025-02-18 RX ORDER — DOXYCYCLINE 100 MG/1
100 CAPSULE ORAL 2 TIMES DAILY
Qty: 20 CAPSULE | Refills: 0 | Status: SHIPPED | OUTPATIENT
Start: 2025-02-18 | End: 2025-02-18 | Stop reason: ALTCHOICE

## 2025-02-18 RX ORDER — METOPROLOL TARTRATE 25 MG/1
25 TABLET, FILM COATED ORAL 2 TIMES DAILY
Qty: 180 TABLET | Refills: 3 | Status: SHIPPED | OUTPATIENT
Start: 2025-02-18

## 2025-02-18 ASSESSMENT — PAIN SCALES - GENERAL: PAINLEVEL_OUTOF10: NO PAIN (0)

## 2025-02-18 NOTE — NURSING NOTE
"Chief Complaint   Patient presents with    Lipids    Hypertension     BP (!) 190/84   Pulse 69   Temp 97  F (36.1  C) (Tympanic)   Resp 14   Ht 1.727 m (5' 8\")   Wt 82.6 kg (182 lb)   SpO2 97%   BMI 27.67 kg/m   Estimated body mass index is 27.67 kg/m  as calculated from the following:    Height as of this encounter: 1.727 m (5' 8\").    Weight as of this encounter: 82.6 kg (182 lb).  Patient presents to the clinic using No DME      Health Maintenance that is potentially due pending provider review:    Health Maintenance Due   Topic Date Due    DTAP/TDAP/TD IMMUNIZATION (1 - Tdap) Never done    RSV VACCINE (1 - 1-dose 75+ series) Never done    ANNUAL REVIEW OF HM ORDERS  06/26/2025                  "

## 2025-02-18 NOTE — PROGRESS NOTES
Assessment & Plan     Stage 3a chronic kidney disease (H)  Adjust meds as indicated by above labs.   - CBC with platelets; Future  - Comprehensive metabolic panel; Future  - Comprehensive metabolic panel  - CBC with platelets    Qualitative platelet defects (H)  Check cbc     Subacute cough  Bronchitis in a smoker   Sxs for 30 days   - XR Chest 2 Views; Future  - doxycycline hyclate (VIBRAMYCIN) 100 MG capsule; Take 1 capsule (100 mg) by mouth 2 times daily.    Chronic gout without tophus, unspecified cause, unspecified site  Adjust meds as indicated by above labs.   - allopurinol (ZYLOPRIM) 100 MG tablet; Take 0.5 tablets (50 mg) by mouth daily.  - Uric acid; Future  - Uric acid    Hypertriglyceridemia  Adjust meds as indicated by above labs.   - omega-3 acid ethyl esters (LOVAZA) 1 g capsule; Take 2 capsules by mouth 2 times daily.  - Lipid panel reflex to direct LDL Fasting; Future  - Lipid panel reflex to direct LDL Fasting    Hyperlipidemia LDL goal <100  Adjust meds as indicated by above labs.   - atorvastatin (LIPITOR) 40 MG tablet; Take 2 tablets (80 mg) by mouth daily.    Benign essential hypertension  Not well controlled but did not take meds today   Will take meds and check BPat home and call in with results  - Lipid panel reflex to direct LDL Fasting; Future  - amLODIPine (NORVASC) 5 MG tablet; Take 1 tablet (5 mg) by mouth daily.  - metoprolol tartrate (LOPRESSOR) 25 MG tablet; Take 1 tablet (25 mg) by mouth 2 times daily.  - Lipid panel reflex to direct LDL Fasting    Cigarette nicotine dependence with nicotine-induced disorder  Will stop   He wants to try on his own     Prediabetes  Reviewed diet   - Hemoglobin A1c; Future  - Hemoglobin A1c      The longitudinal plan of care for the diagnosis(es)/condition(s) as documented were addressed during this visit. Due to the added complexity in care, I will continue to support Bruno in the subsequent management and with ongoing continuity of  "care.      BMI  Estimated body mass index is 27.67 kg/m  as calculated from the following:    Height as of this encounter: 1.727 m (5' 8\").    Weight as of this encounter: 82.6 kg (182 lb).             Jackie Carr is a 74 year old, presenting for the following health issues:  Lipids and Hypertension        2/18/2025     7:42 AM   Additional Questions   Roomed by Bev FRANCISCO   Accompanied by Self         2/18/2025     7:42 AM   Patient Reported Additional Medications   Patient reports taking the following new medications .     History of Present Illness       Reason for visit:  Yearly checkup   He is taking medications regularly.         Hyperlipidemia Follow-Up    Are you regularly taking any medication or supplement to lower your cholesterol?   Yes- Lipitor  Are you having muscle aches or other side effects that you think could be caused by your cholesterol lowering medication?  No    Hypertension Follow-up    Do you check your blood pressure regularly outside of the clinic? No   Are you following a low salt diet? Yes  Are your blood pressures ever more than 140 on the top number (systolic) OR more   than 90 on the bottom number (diastolic), for example 140/90? N/A      Cough   Has had a cough x 1 month  Productive  No fever, chills or sweats  Using nyquil cold and flu    Smoking still 1/2-1 ppd   Wanting to quit just has not done it   Has quit many times in the past       Chronic Kidney Disease Follow-up    Do you take any over the counter pain medicine?: No      Review of Systems  Constitutional, HEENT, cardiovascular, pulmonary, gi and gu systems are negative, except as otherwise noted.      Objective    BP (!) 160/78   Pulse 69   Temp 97  F (36.1  C) (Tympanic)   Resp 14   Ht 1.727 m (5' 8\")   Wt 82.6 kg (182 lb)   SpO2 97%   BMI 27.67 kg/m    Body mass index is 27.67 kg/m .  Physical Exam   GENERAL: alert and no distress  HENT: ear canals and TM's normal, nose and mouth without ulcers or " lesions  NECK: no adenopathy, no asymmetry, masses, or scars  RESP: lungs clear to auscultation - no rales, rhonchi or wheezes  CV: regular rate and rhythm, normal S1 S2, no S3 or S4, no murmur, click or rub, no peripheral edema   MS: no gross musculoskeletal defects noted, no edema  PSYCH: mentation appears normal, affect normal/bright            Signed Electronically by: Charisse Lopez MD

## 2025-03-03 ENCOUNTER — LAB (OUTPATIENT)
Dept: LAB | Facility: CLINIC | Age: 75
End: 2025-03-03
Payer: COMMERCIAL

## 2025-03-03 DIAGNOSIS — M10.9 ACUTE GOUT, UNSPECIFIED CAUSE, UNSPECIFIED SITE: ICD-10-CM

## 2025-03-03 DIAGNOSIS — N18.31 STAGE 3A CHRONIC KIDNEY DISEASE (H): ICD-10-CM

## 2025-03-03 LAB
ANION GAP SERPL CALCULATED.3IONS-SCNC: 7 MMOL/L (ref 7–15)
BUN SERPL-MCNC: 23.4 MG/DL (ref 8–23)
CALCIUM SERPL-MCNC: 10.4 MG/DL (ref 8.8–10.4)
CHLORIDE SERPL-SCNC: 101 MMOL/L (ref 98–107)
CREAT SERPL-MCNC: 1.4 MG/DL (ref 0.67–1.17)
EGFRCR SERPLBLD CKD-EPI 2021: 52 ML/MIN/1.73M2
GLUCOSE SERPL-MCNC: 90 MG/DL (ref 70–99)
HCO3 SERPL-SCNC: 25 MMOL/L (ref 22–29)
POTASSIUM SERPL-SCNC: 4.6 MMOL/L (ref 3.4–5.3)
SODIUM SERPL-SCNC: 133 MMOL/L (ref 135–145)
URATE SERPL-MCNC: 6.9 MG/DL (ref 3.4–7)

## 2025-03-03 PROCEDURE — 84550 ASSAY OF BLOOD/URIC ACID: CPT

## 2025-03-03 PROCEDURE — 80048 BASIC METABOLIC PNL TOTAL CA: CPT

## 2025-03-03 PROCEDURE — 36415 COLL VENOUS BLD VENIPUNCTURE: CPT

## 2025-03-04 DIAGNOSIS — N18.31 STAGE 3A CHRONIC KIDNEY DISEASE (H): Primary | ICD-10-CM

## 2025-04-01 ENCOUNTER — LAB (OUTPATIENT)
Dept: LAB | Facility: CLINIC | Age: 75
End: 2025-04-01
Payer: COMMERCIAL

## 2025-04-01 DIAGNOSIS — N18.31 STAGE 3A CHRONIC KIDNEY DISEASE (H): ICD-10-CM

## 2025-04-01 LAB
ANION GAP SERPL CALCULATED.3IONS-SCNC: 11 MMOL/L (ref 7–15)
BUN SERPL-MCNC: 23.9 MG/DL (ref 8–23)
CALCIUM SERPL-MCNC: 10.3 MG/DL (ref 8.8–10.4)
CHLORIDE SERPL-SCNC: 102 MMOL/L (ref 98–107)
CREAT SERPL-MCNC: 1.64 MG/DL (ref 0.67–1.17)
EGFRCR SERPLBLD CKD-EPI 2021: 43 ML/MIN/1.73M2
GLUCOSE SERPL-MCNC: 126 MG/DL (ref 70–99)
HCO3 SERPL-SCNC: 25 MMOL/L (ref 22–29)
POTASSIUM SERPL-SCNC: 3.9 MMOL/L (ref 3.4–5.3)
SODIUM SERPL-SCNC: 138 MMOL/L (ref 135–145)

## 2025-04-01 PROCEDURE — 36415 COLL VENOUS BLD VENIPUNCTURE: CPT

## 2025-04-01 PROCEDURE — 80048 BASIC METABOLIC PNL TOTAL CA: CPT

## 2025-04-08 ENCOUNTER — HOSPITAL ENCOUNTER (OUTPATIENT)
Dept: ULTRASOUND IMAGING | Facility: CLINIC | Age: 75
Discharge: HOME | End: 2025-04-08
Attending: FAMILY MEDICINE | Admitting: FAMILY MEDICINE
Payer: COMMERCIAL

## 2025-04-08 DIAGNOSIS — N18.32 STAGE 3B CHRONIC KIDNEY DISEASE (H): ICD-10-CM

## 2025-04-08 DIAGNOSIS — I10 ESSENTIAL (PRIMARY) HYPERTENSION: ICD-10-CM

## 2025-04-08 PROCEDURE — 76770 US EXAM ABDO BACK WALL COMP: CPT

## 2025-04-15 ENCOUNTER — TELEPHONE (OUTPATIENT)
Dept: FAMILY MEDICINE | Facility: CLINIC | Age: 75
End: 2025-04-15
Payer: COMMERCIAL

## 2025-04-15 ENCOUNTER — TELEPHONE (OUTPATIENT)
Dept: OTHER | Facility: CLINIC | Age: 75
End: 2025-04-15
Payer: COMMERCIAL

## 2025-04-15 DIAGNOSIS — I70.1 RENAL ARTERY STENOSIS: Primary | ICD-10-CM

## 2025-04-15 NOTE — TELEPHONE ENCOUNTER
----- Message from Sarah JONES sent at 4/15/2025  2:02 PM CDT -----  Hi Dr. Lopez,     I had just spoken to you about Bruno.  Our vascular surgeons actually do not see patients with renal artery stenosis, but our vascular medicine providers do.  Would you mind putting in a referral to vascular medicine for Bruno and we will reach out to him to schedule.      ANDREW Smith

## 2025-04-15 NOTE — TELEPHONE ENCOUNTER
Referral received via [email or web address] on 04/15/2025.    Referred by Dr. Charisse Lopez for renal artery stenosis.  Former patient of Dr. Amanda and Pearl Sanderson PA-C.  Hx S/p left carotid endarterectomy, 4.5 cm AAA, 12/06/24 date last seen with Dr. Amanda    Previous imaging completed (pertinent to referral):  See EPIC    Routing to scheduling to coordinate the following:  NEW VASCULAR PATIENT consult with Vascular Medicine  Please schedule this at next available        Appt note:  Referred by Dr. Charisse Lopez for renal artery stenosis.  Former patient of Dr. Amanda and Pearl Sanderson PA-C.  Hx S/p left carotid endarterectomy, 4.5 cm AAA, 12/06/24 date last seen with Dr. Amanda

## 2025-04-15 NOTE — TELEPHONE ENCOUNTER
Spoke to patient's PCP, Dr. Lopez, on the phone today.  She is concerned about findings on the patient's US renal complete arterial duplex (renal artery stenosis).  She is wanting to know if the patient should be seen with vascular surgery sooner than August 2025 (advised at patient's last visit with Dr. Amanda 12/06/24).      Hx S/p left carotid endarterectomy, 4.5 cm AAA  12/06/24 date last seen with Dr. Amanda.    Per [facility] protocol, renal artery stenosis is seen with vascular medicine.  Patient's PCP to place referral to vascular medicine.

## 2025-04-23 ENCOUNTER — OFFICE VISIT (OUTPATIENT)
Dept: OTHER | Facility: CLINIC | Age: 75
End: 2025-04-23
Attending: INTERNAL MEDICINE
Payer: COMMERCIAL

## 2025-04-23 VITALS
OXYGEN SATURATION: 96 % | HEART RATE: 54 BPM | DIASTOLIC BLOOD PRESSURE: 90 MMHG | SYSTOLIC BLOOD PRESSURE: 210 MMHG | BODY MASS INDEX: 28.25 KG/M2 | WEIGHT: 185.8 LBS

## 2025-04-23 DIAGNOSIS — E78.5 HYPERLIPIDEMIA LDL GOAL <70: ICD-10-CM

## 2025-04-23 DIAGNOSIS — I10 ESSENTIAL HYPERTENSION: ICD-10-CM

## 2025-04-23 DIAGNOSIS — E78.1 HYPERTRIGLYCERIDEMIA: ICD-10-CM

## 2025-04-23 DIAGNOSIS — R73.01 IFG (IMPAIRED FASTING GLUCOSE): ICD-10-CM

## 2025-04-23 DIAGNOSIS — N18.31 STAGE 3A CHRONIC KIDNEY DISEASE (H): ICD-10-CM

## 2025-04-23 DIAGNOSIS — I70.1 RENAL ARTERY STENOSIS: ICD-10-CM

## 2025-04-23 DIAGNOSIS — M10.00 IDIOPATHIC GOUT, UNSPECIFIED CHRONICITY, UNSPECIFIED SITE: ICD-10-CM

## 2025-04-23 DIAGNOSIS — Z72.0 TOBACCO ABUSE: ICD-10-CM

## 2025-04-23 DIAGNOSIS — I71.40 ABDOMINAL AORTIC ANEURYSM (AAA) 3.0 CM TO 5.5 CM IN DIAMETER IN MALE: ICD-10-CM

## 2025-04-23 DIAGNOSIS — I65.23 BILATERAL CAROTID ARTERY STENOSIS: Primary | ICD-10-CM

## 2025-04-23 PROCEDURE — G2211 COMPLEX E/M VISIT ADD ON: HCPCS | Performed by: INTERNAL MEDICINE

## 2025-04-23 PROCEDURE — G0463 HOSPITAL OUTPT CLINIC VISIT: HCPCS | Performed by: INTERNAL MEDICINE

## 2025-04-23 PROCEDURE — 99215 OFFICE O/P EST HI 40 MIN: CPT | Performed by: INTERNAL MEDICINE

## 2025-04-23 PROCEDURE — 3079F DIAST BP 80-89 MM HG: CPT | Performed by: INTERNAL MEDICINE

## 2025-04-23 PROCEDURE — 3077F SYST BP >= 140 MM HG: CPT | Performed by: INTERNAL MEDICINE

## 2025-04-23 PROCEDURE — 99417 PROLNG OP E/M EACH 15 MIN: CPT | Performed by: INTERNAL MEDICINE

## 2025-04-23 RX ORDER — HYDROCHLOROTHIAZIDE 12.5 MG/1
12.5 TABLET ORAL DAILY
Qty: 30 TABLET | Refills: 1 | Status: CANCELLED | OUTPATIENT
Start: 2025-04-23

## 2025-04-23 RX ORDER — ISOSORBIDE MONONITRATE 30 MG/1
30 TABLET, EXTENDED RELEASE ORAL DAILY
Qty: 30 TABLET | Refills: 1 | Status: CANCELLED | OUTPATIENT
Start: 2025-04-23

## 2025-04-23 NOTE — PROGRESS NOTES
[facility]        Patient is here for a consult.    Pt is currently taking Aspirin and Statin.    BP (!) 210/90 (BP Location: Left arm, Patient Position: Chair, Cuff Size: Adult Regular)   Pulse 54   Wt 185 lb 12.8 oz (84.3 kg)   SpO2 96%   BMI 28.25 kg/m      The provider has been notified that the patient has high blood pressure.    Questions patient would like addressed today are: N/A.    Refills are needed: N/A    Has homecare services and agency name:  Agustina Rivera MA

## 2025-04-23 NOTE — PROGRESS NOTES
INITIAL VASCULAR MEDICAL ASSESSMENT  REFERRAL SOURCE: Dr. Charisse Lopez for renal artery stenosis.   REASON FOR CONSULT: for renal artery stenosis, h/o AAA, Lt CEA, former tobacco use           A/P:               (I70.1) Renal artery stenosis  Comment: His renal resistive indices imply he would like respond to renal artery stenting on the right, but less likely on the left. We do not know if he even needs this as we have no real world BP data on the patient. His BP is quite high here but he had a stressful drive in . He has no headaches, visual changes, nausea, confusion. He is told that if he develops those sxs with an SBP > 180 he needs to go to the ED. He states his SBP at home is 150. RTC after below imaging, bring in log book data.   Plan: Consider adding low dose thiazide if needed. Exhaust pharmacologic options prior to proceeding to stenting or denervation.     (I71.40) Abdominal aortic aneurysm (AAA) 3.0 cm to 5.5 cm in diameter in male  Comment: This is growing.  Plan: CTA Abdomen Pelvis with Contrast        Check the above, RTC one week later. May need prehydration.       (I65.23) Bilateral carotid artery stenosis  (primary encounter diagnosis)  Comment: surveillance monitoring though Dr. Porter.   Plan: Carotid duplex in 12/2024.      (Z72.0) Tobacco abuse  Plan: He has stopped.      (E78.5) Hyperlipidemia LDL goal <70  Comment: Check the below.  Plan: C-Reactive Protein, High Sensitivity,         Lipoprotein (a), LipoFit by NMR            (E78.1) Hypertriglyceridemia  Comment: Check the below.  Plan: C-Reactive Protein, High Sensitivity,         Lipoprotein (a), LipoFit by NMR            (M10.00) Idiopathic gout, unspecified chronicity, unspecified site  Comment: This limits our ability to use Nexletol in the future for hyperlipidemia treatment if needed.         (I10) Essential hypertension  Comment: See above regarding NASIR.  Plan: As above.      (R73.01) IFG (impaired fasting glucose)  Comment: Check  the below.  Plan: Comprehensive metabolic panel            (N18.31) Stage 3a chronic kidney disease (H)  Comment: Wed CTA data.  Plan: Prehydrate prn.       The longitudinal care of plan for Bruno was addressed during this visit. Due to added complexity of care, we will continue to support Bruno Baig  and the subsequent management of these conditions and with ongoing continuity of care for these conditions.       70 minutes total medical care on today's date.        HPI: Bruno Baig is a 74 year old male with a h/o gout, htn, HLD, IFG, hyperTGemia, and stage 3 CKD.. The patient presents today to address htn with recently discovered NASIR on renal duplex. He recently stopped smoking, His Rt RAR index is < 0.8. His left RAR index is up to 0.9. His BP is markedly elevated here today. He had a stressful drive through [facility] during rush hour. He normal has aBP which although elevated is much lower at 150/90. He is currently on a beta blocker and calcium blocker and is bradycardic in the mid to high 50s. He is tolerating this without difficulty. Lisinopril, losartan and hydrochlorothiazide stopped in past due to NOEL/side effects.    Review Of Systems  Skin: negative  Eyes: negative  Ears/Nose/Throat: negative  Respiratory: No shortness of breath, dyspnea on exertion, cough, or hemoptysis  Cardiovascular: negative  Gastrointestinal: negative  Genitourinary: negative  Musculoskeletal: negative  Neurologic: negative  Psychiatric: negative  Hematologic/Lymphatic/Immunologic: negative  Endocrine: negative      PAST MEDICAL HISTORY:                No past medical history on file.    PAST SURGICAL HISTORY:                  Past Surgical History:   Procedure Laterality Date    COLONOSCOPY N/A 11/9/2017    Procedure: COLONOSCOPY;  colonoscopy;  Surgeon: Justice Horner MD;  Location: [facility]    ENDARTERECTOMY CAROTID Left 7/13/2023    Procedure: LEFT CAROTID ENDARTERECTOMY with pericardial patch angioplasty and  NASAL INTUBATION AND ELECTROENCEPHALOGRAM;  Surgeon: Enzo Amanda MD;  Location:  OR       CURRENT MEDICATIONS:                  Current Outpatient Medications   Medication Sig Dispense Refill    allopurinol (ZYLOPRIM) 100 MG tablet Take 0.5 tablets (50 mg) by mouth daily. 45 tablet 3    amLODIPine (NORVASC) 5 MG tablet Take 1 tablet (5 mg) by mouth daily. 90 tablet 3    aspirin 81 MG EC tablet Take 1 tablet (81 mg) by mouth daily 60 tablet 1    atorvastatin (LIPITOR) 40 MG tablet Take 2 tablets (80 mg) by mouth daily. 180 tablet 3    Cholecalciferol (VITAMIN D3) 2000 units CAPS Take 1 capsule by mouth every morning       Cyanocobalamin (B-12) 1000 MCG CAPS Take 1 capsule by mouth daily      doxycycline hyclate (VIBRAMYCIN) 100 MG capsule Take 1 capsule (100 mg) by mouth 2 times daily. 20 capsule 0    indomethacin (INDOCIN) 25 MG capsule TAKE 1 CAPSULE (25 MG) BY MOUTH 2 TIMES DAILY (WITH MEALS)**NOT COVERED** (Patient taking differently: as needed.) 10 capsule 0    metoprolol tartrate (LOPRESSOR) 25 MG tablet Take 1 tablet (25 mg) by mouth 2 times daily. 180 tablet 3    omega-3 acid ethyl esters (LOVAZA) 1 g capsule Take 2 capsules by mouth 2 times daily. 360 capsule 3       ALLERGIES:                No Known Allergies    SOCIAL HISTORY:                  Social History     Socioeconomic History    Marital status:      Spouse name: Not on file    Number of children: Not on file    Years of education: Not on file    Highest education level: Not on file   Occupational History    Not on file   Tobacco Use    Smoking status: Every Day     Current packs/day: 0.00     Average packs/day: 0.5 packs/day for 30.0 years (15.0 ttl pk-yrs)     Types: Cigarettes     Start date: 1993     Last attempt to quit: 2023     Years since quittin.7    Smokeless tobacco: Never    Tobacco comments:     1/2 pack daily    Vaping Use    Vaping status: Never Used   Substance and Sexual Activity    Alcohol use: Yes      Comment: 1-2 daily    Drug use: No    Sexual activity: Yes     Partners: Female   Other Topics Concern    Parent/sibling w/ CABG, MI or angioplasty before 65F 55M? No   Social History Narrative    Not on file     Social Drivers of Health     Financial Resource Strain: Low Risk  (6/26/2024)    Financial Resource Strain     Within the past 12 months, have you or your family members you live with been unable to get utilities (heat, electricity) when it was really needed?: No   Food Insecurity: Low Risk  (6/26/2024)    Food Insecurity     Within the past 12 months, did you worry that your food would run out before you got money to buy more?: No     Within the past 12 months, did the food you bought just not last and you didn t have money to get more?: No   Transportation Needs: Low Risk  (6/26/2024)    Transportation Needs     Within the past 12 months, has lack of transportation kept you from medical appointments, getting your medicines, non-medical meetings or appointments, work, or from getting things that you need?: No   Physical Activity: Unknown (6/26/2024)    Exercise Vital Sign     Days of Exercise per Week: 0 days     Minutes of Exercise per Session: Not on file   Stress: No Stress Concern Present (6/26/2024)    [employer] - Occupational Stress Questionnaire     Feeling of Stress : Not at all   Social Connections: Unknown (6/26/2024)    Social Connection and Isolation Panel [NHANES]     Frequency of Communication with Friends and Family: Not on file     Frequency of Social Gatherings with Friends and Family: Twice a week     Attends Rastafarian Services: Not on file     Active Member of Clubs or Organizations: Not on file     Attends Club or Organization Meetings: Not on file     Marital Status: Not on file   Interpersonal Safety: Low Risk  (2/18/2025)    Interpersonal Safety     Do you feel physically and emotionally safe where you currently live?: Yes     Within the past 12 months,  have you been hit, slapped, kicked or otherwise physically hurt by someone?: No     Within the past 12 months, have you been humiliated or emotionally abused in other ways by your partner or ex-partner?: No   Housing Stability: Low Risk  (6/26/2024)    Housing Stability     Do you have housing? : Yes     Are you worried about losing your housing?: No       FAMILY HISTORY:                   Family History   Problem Relation Age of Onset    Other Cancer Mother         lung    Heart Failure Father     Lung Cancer Sister          Physical exam Reveals:    O/P: WNL  HEENT: WNL  NECK: No JVD, thyromegaly, or lymphadenopathy  HEART: RRR, no murmurs, gallops, or rubs  LUNGS: CTA bilaterally without rales, wheezes, or rhonchi  GI: NABS, nondistended, nontender, soft  EXT:without cyanosis, clubbing, or edema  NEURO: nonfocal  : no flank tenderness           All relevant labs and imaging reviewed by myself on today's date.

## 2025-04-24 ENCOUNTER — HOSPITAL ENCOUNTER (EMERGENCY)
Facility: CLINIC | Age: 75
Discharge: HOME | End: 2025-04-24
Attending: EMERGENCY MEDICINE
Payer: COMMERCIAL

## 2025-04-24 VITALS
HEART RATE: 56 BPM | BODY MASS INDEX: 28.13 KG/M2 | TEMPERATURE: 98 F | SYSTOLIC BLOOD PRESSURE: 200 MMHG | OXYGEN SATURATION: 99 % | DIASTOLIC BLOOD PRESSURE: 94 MMHG | WEIGHT: 185 LBS | RESPIRATION RATE: 16 BRPM

## 2025-04-24 DIAGNOSIS — I10 ASYMPTOMATIC HYPERTENSION: ICD-10-CM

## 2025-04-24 DIAGNOSIS — N18.32 STAGE 3B CHRONIC KIDNEY DISEASE (H): ICD-10-CM

## 2025-04-24 DIAGNOSIS — I10 BENIGN ESSENTIAL HYPERTENSION: ICD-10-CM

## 2025-04-24 LAB
ANION GAP SERPL CALCULATED.3IONS-SCNC: 11 MMOL/L (ref 7–15)
BASOPHILS # BLD AUTO: 0.1 10E3/UL (ref 0–0.2)
BASOPHILS NFR BLD AUTO: 1 %
BUN SERPL-MCNC: 20.1 MG/DL (ref 8–23)
CALCIUM SERPL-MCNC: 10.4 MG/DL (ref 8.8–10.4)
CHLORIDE SERPL-SCNC: 104 MMOL/L (ref 98–107)
CREAT SERPL-MCNC: 1.8 MG/DL (ref 0.67–1.17)
EGFRCR SERPLBLD CKD-EPI 2021: 39 ML/MIN/1.73M2
EOSINOPHIL # BLD AUTO: 0.4 10E3/UL (ref 0–0.7)
EOSINOPHIL NFR BLD AUTO: 6 %
ERYTHROCYTE [DISTWIDTH] IN BLOOD BY AUTOMATED COUNT: 12.1 % (ref 10–15)
GLUCOSE SERPL-MCNC: 142 MG/DL (ref 70–99)
HCO3 SERPL-SCNC: 24 MMOL/L (ref 22–29)
HCT VFR BLD AUTO: 40.1 % (ref 40–53)
HGB BLD-MCNC: 13.8 G/DL (ref 13.3–17.7)
IMM GRANULOCYTES # BLD: 0 10E3/UL
IMM GRANULOCYTES NFR BLD: 0 %
LYMPHOCYTES # BLD AUTO: 2.4 10E3/UL (ref 0.8–5.3)
LYMPHOCYTES NFR BLD AUTO: 33 %
MCH RBC QN AUTO: 31.9 PG (ref 26.5–33)
MCHC RBC AUTO-ENTMCNC: 34.4 G/DL (ref 31.5–36.5)
MCV RBC AUTO: 93 FL (ref 78–100)
MONOCYTES # BLD AUTO: 0.9 10E3/UL (ref 0–1.3)
MONOCYTES NFR BLD AUTO: 13 %
NEUTROPHILS # BLD AUTO: 3.5 10E3/UL (ref 1.6–8.3)
NEUTROPHILS NFR BLD AUTO: 48 %
NRBC # BLD AUTO: 0 10E3/UL
NRBC BLD AUTO-RTO: 0 /100
PLATELET # BLD AUTO: 252 10E3/UL (ref 150–450)
POTASSIUM SERPL-SCNC: 3.8 MMOL/L (ref 3.4–5.3)
RBC # BLD AUTO: 4.32 10E6/UL (ref 4.4–5.9)
SODIUM SERPL-SCNC: 139 MMOL/L (ref 135–145)
TROPONIN T SERPL HS-MCNC: 16 NG/L
WBC # BLD AUTO: 7.3 10E3/UL (ref 4–11)

## 2025-04-24 PROCEDURE — 36415 COLL VENOUS BLD VENIPUNCTURE: CPT | Performed by: EMERGENCY MEDICINE

## 2025-04-24 PROCEDURE — 93005 ELECTROCARDIOGRAM TRACING: CPT | Performed by: EMERGENCY MEDICINE

## 2025-04-24 PROCEDURE — 85025 COMPLETE CBC W/AUTO DIFF WBC: CPT | Performed by: EMERGENCY MEDICINE

## 2025-04-24 PROCEDURE — 99284 EMERGENCY DEPT VISIT MOD MDM: CPT | Performed by: EMERGENCY MEDICINE

## 2025-04-24 PROCEDURE — 80048 BASIC METABOLIC PNL TOTAL CA: CPT | Performed by: EMERGENCY MEDICINE

## 2025-04-24 PROCEDURE — 84484 ASSAY OF TROPONIN QUANT: CPT | Performed by: EMERGENCY MEDICINE

## 2025-04-24 PROCEDURE — 93010 ELECTROCARDIOGRAM REPORT: CPT | Performed by: EMERGENCY MEDICINE

## 2025-04-24 RX ORDER — CHLORTHALIDONE 25 MG/1
25 TABLET ORAL DAILY
Qty: 30 TABLET | Refills: 0 | Status: SHIPPED | OUTPATIENT
Start: 2025-04-24 | End: 2025-05-24

## 2025-04-24 ASSESSMENT — ACTIVITIES OF DAILY LIVING (ADL)
ADLS_ACUITY_SCORE: 44
ADLS_ACUITY_SCORE: 44

## 2025-04-24 ASSESSMENT — COLUMBIA-SUICIDE SEVERITY RATING SCALE - C-SSRS
6. HAVE YOU EVER DONE ANYTHING, STARTED TO DO ANYTHING, OR PREPARED TO DO ANYTHING TO END YOUR LIFE?: NO
1. IN THE PAST MONTH, HAVE YOU WISHED YOU WERE DEAD OR WISHED YOU COULD GO TO SLEEP AND NOT WAKE UP?: NO
2. HAVE YOU ACTUALLY HAD ANY THOUGHTS OF KILLING YOURSELF IN THE PAST MONTH?: NO

## 2025-04-25 LAB
ATRIAL RATE - MUSE: 55 BPM
DIASTOLIC BLOOD PRESSURE - MUSE: NORMAL MMHG
INTERPRETATION ECG - MUSE: NORMAL
P AXIS - MUSE: 38 DEGREES
PR INTERVAL - MUSE: 168 MS
QRS DURATION - MUSE: 72 MS
QT - MUSE: 390 MS
QTC - MUSE: 373 MS
R AXIS - MUSE: 39 DEGREES
SYSTOLIC BLOOD PRESSURE - MUSE: NORMAL MMHG
T AXIS - MUSE: 79 DEGREES
VENTRICULAR RATE- MUSE: 55 BPM

## 2025-04-25 NOTE — ED PROVIDER NOTES
History   No chief complaint on file.    HPI  Bruno Baig is a 74 year old male with history significant for hypertension, AAA without rupture, hyperlipidemia, CKD 3, renal artery stenosis, who presents with concerns related to his elevated blood pressure.     Comment: His renal resistive indices imply he would like respond to renal artery stenting on the right, but less likely on the left. We do not know if he even needs this as we have no real world BP data on the patient. His BP is quite high here but he had a stressful drive in . He has no headaches, visual changes, nausea, confusion. He is told that if he develops those sxs with an SBP > 180 he needs to go to the ED. He states his SBP at home is 150. RTC after below imaging, bring in log book data.   Plan: Consider adding low dose thiazide if needed. Exhaust pharmacologic options prior to proceeding to stenting or denervation.      Nonischemic ECG, no troponin elevation. Cr increased to 1.8. has been steadly increasing over past several months.     Adding chlorthaladone 25 mg.     The patient's PMHx, Surgical Hx, Allergies, and Medications were all reviewed with the patient.    Allergies:  No Known Allergies    Problem List:    Patient Active Problem List    Diagnosis Date Noted    Qualitative platelet defects (H) 02/18/2025     Priority: Medium    Need for prophylactic antibiotic 07/13/2023     Priority: Medium    Symptomatic carotid artery stenosis without infarction, unspecified laterality 07/13/2023     Priority: Medium    Cigarette nicotine dependence with nicotine-induced disorder 07/14/2020     Priority: Medium    CKD (chronic kidney disease) stage 3, GFR 30-59 ml/min (H) 07/01/2019     Priority: Medium    Anemia due to vitamin B12 deficiency, unspecified B12 deficiency type 07/20/2018     Priority: Medium    Hyperlipidemia LDL goal <100 07/24/2017     Priority: Medium    Refuses tetanus, diphtheria, and acellular pertussis (Tdap) vaccination  2017     Priority: Medium    Pneumococcal vaccination declined by patient 2017     Priority: Medium    Abdominal aortic aneurysm (AAA) without rupture 03/10/2017     Priority: Medium     3.9 cm on US March 10, 2017       Pre-diabetes 10/18/2016     Priority: Medium    Benign essential hypertension 2016     Priority: Medium    Acute gout, unspecified cause, unspecified site 2016     Priority: Medium        Past Medical History:    No past medical history on file.    Past Surgical History:    Past Surgical History:   Procedure Laterality Date    COLONOSCOPY N/A 2017    Procedure: COLONOSCOPY;  colonoscopy;  Surgeon: Justice Horner MD;  Location: [facility]    ENDARTERECTOMY CAROTID Left 2023    Procedure: LEFT CAROTID ENDARTERECTOMY with pericardial patch angioplasty and NASAL INTUBATION AND ELECTROENCEPHALOGRAM;  Surgeon: Enzo Amanda MD;  Location:  OR       Family History:    Family History   Problem Relation Age of Onset    Other Cancer Mother         lung    Heart Failure Father     Lung Cancer Sister        Social History:  Marital Status:   [2]  Social History     Tobacco Use    Smoking status: Every Day     Current packs/day: 0.00     Average packs/day: 0.5 packs/day for 30.0 years (15.0 ttl pk-yrs)     Types: Cigarettes     Start date: 1993     Last attempt to quit: 2023     Years since quittin.7    Smokeless tobacco: Never    Tobacco comments:     1/2 pack daily    Vaping Use    Vaping status: Never Used   Substance Use Topics    Alcohol use: Yes     Comment: 1-2 daily    Drug use: No        Medications:    allopurinol (ZYLOPRIM) 100 MG tablet  amLODIPine (NORVASC) 5 MG tablet  aspirin 81 MG EC tablet  atorvastatin (LIPITOR) 40 MG tablet  Cholecalciferol (VITAMIN D3) 2000 units CAPS  Cyanocobalamin (B-12) 1000 MCG CAPS  metoprolol tartrate (LOPRESSOR) 25 MG tablet  omega-3 acid ethyl esters (LOVAZA) 1 g capsule          Review of Systems  Pertinent  "positives and negatives mentioned in HPI    Physical Exam        {[facility] EXAM SAMPLES:463595}    ED Course        Procedures         {EK}       Critical Care time:  {none or minutes:508078}  {Trauma Activation or Fall?:563133}  {Sepsis/Septic Shock/Stemi/Stroke:087186::\"None\"}         No results found for this or any previous visit (from the past 24 hours).    Medications - No data to display    Assessments & Plan (with Medical Decision Making)   74 year old male with past medical history ***    {Admit:979742}    I have reviewed the nursing notes.    {ED Addendum:139766::\" \"}    New Prescriptions    No medications on file       Final diagnoses:   None     Stuart Zazueta MD        2025   [facility] EMERGENCY DEPT    Disclaimer: This note consists of words and symbols derived from keyboarding and dictation using voice recognition software.  As a result, there may be errors that have gone undetected.  Please consider this when interpreting information found in this note.            " PROVIDER NOTE FOR, ECG INTERPRETATION (4000),  Ez Oswald ([id number]) on 4/25/2025 12:52:36 AM     CBC with platelets differential    Narrative    The following orders were created for panel order CBC with platelets differential.  Procedure                               Abnormality         Status                     ---------                               -----------         ------                     CBC with platelets and ...[[id number]]  Abnormal            Final result                 Please view results for these tests on the individual orders.   Basic metabolic panel   Result Value Ref Range    Sodium 139 135 - 145 mmol/L    Potassium 3.8 3.4 - 5.3 mmol/L    Chloride 104 98 - 107 mmol/L    Carbon Dioxide (CO2) 24 22 - 29 mmol/L    Anion Gap 11 7 - 15 mmol/L    Urea Nitrogen 20.1 8.0 - 23.0 mg/dL    Creatinine 1.80 (H) 0.67 - 1.17 mg/dL    GFR Estimate 39 (L) >60 mL/min/1.73m2    Calcium 10.4 8.8 - 10.4 mg/dL    Glucose 142 (H) 70 - 99 mg/dL   CBC with platelets and differential   Result Value Ref Range    WBC Count 7.3 4.0 - 11.0 10e3/uL    RBC Count 4.32 (L) 4.40 - 5.90 10e6/uL    Hemoglobin 13.8 13.3 - 17.7 g/dL    Hematocrit 40.1 40.0 - 53.0 %    MCV 93 78 - 100 fL    MCH 31.9 26.5 - 33.0 pg    MCHC 34.4 31.5 - 36.5 g/dL    RDW 12.1 10.0 - 15.0 %    Platelet Count 252 150 - 450 10e3/uL    % Neutrophils 48 %    % Lymphocytes 33 %    % Monocytes 13 %    % Eosinophils 6 %    % Basophils 1 %    % Immature Granulocytes 0 %    NRBCs per 100 WBC 0 <1 /100    Absolute Neutrophils 3.5 1.6 - 8.3 10e3/uL    Absolute Lymphocytes 2.4 0.8 - 5.3 10e3/uL    Absolute Monocytes 0.9 0.0 - 1.3 10e3/uL    Absolute Eosinophils 0.4 0.0 - 0.7 10e3/uL    Absolute Basophils 0.1 0.0 - 0.2 10e3/uL    Absolute Immature Granulocytes 0.0 <=0.4 10e3/uL    Absolute NRBCs 0.0 10e3/uL   Troponin T, High Sensitivity   Result Value Ref Range    Troponin T, High Sensitivity 16 <=22 ng/L       Medications - No data to display    Assessments  & Plan (with Medical Decision Making)   74 year old male with past medical history of hyper tension and currently on 5 mg amlodipine, 25 mg metoprolol tartrate, renal artery and AAA who presents to the ED for evaluation of elevated blood pressure reading at home in context of having vascular consultation yesterday.  Notes that encounter reviewed.  Recommendation for addition of thiazide prior to intervening on renal artery stenosis.  Patient is feeling well and denies any symptoms.  Specific no chest pain, pressure, shortness of breath, extremity edema, headache, vision changes or confusion.  ECG sinus bradycardia.  Similar to previous ECG.  With his heart rate in the 50s I am not sure if he would tolerate increase of beta-blocker or calcium channel blocker.  Was previously on losartan and lisinopril but discontinued due to worsening renal function.  It appears that over the course of the past year is had a slow rise in his creatinine.  It is now up to 1.8.  Was 1.64 3 weeks ago, 1.38 2 months ago.  No signs of encephalopathy.  Patient's blood pressure was markedly elevated during his ED visit.  It is similar to his clinic appointment yesterday.  Systolic did drop below 200 while resting.  Will start chlorthalidone 25 mg.  Prescription sent to his preferred pharmacy.  We did discuss taking that this evening however it is 10:30 in the evening and taking a diuretic may be better tolerated during the daytime.  He agreed.  I do not think that he needs his blood pressure aggressively lowered in the emergency department.  He did take his evening dose of metoprolol about 6 PM which is his usual time.    Want him to follow-up closely with his primary care provider.  He has an appointment scheduled within the next 2 weeks.  This would be a good timeframe to see how he responds to chlorthalidone and to monitor his renal function.  Strict ED return were discussed.             I have reviewed the nursing notes.          Discharge Medication List as of 4/24/2025 11:01 PM        START taking these medications    Details   chlorthalidone (HYGROTON) 25 MG tablet Take 1 tablet (25 mg) by mouth daily., Disp-30 tablet, R-0, E-Prescribe             Final diagnoses:   Stage 3b chronic kidney disease (H)   Benign essential hypertension   Asymptomatic hypertension     Stuart Zazueta MD        4/24/2025   [facility] EMERGENCY DEPT    Disclaimer: This note consists of words and symbols derived from keyboarding and dictation using voice recognition software.  As a result, there may be errors that have gone undetected.  Please consider this when interpreting information found in this note.               Stuart Zazueta MD  04/25/25 [id number]

## 2025-04-25 NOTE — ED TRIAGE NOTES
Presents with concerns of high BP readings at home tonight of 229/102.   Pt states he just recently started checking his BP at the recommendation of his doctor.   Endorses head pressure.   No recent med changes.        Triage Assessment (Adult)       Row Name 04/24/25 2133          Triage Assessment    Airway WDL WDL        Respiratory WDL    Respiratory WDL WDL        Skin Circulation/Temperature WDL    Skin Circulation/Temperature WDL WDL        Cardiac WDL    Cardiac WDL WDL        Peripheral/Neurovascular WDL    Peripheral Neurovascular WDL WDL        Cognitive/Neuro/Behavioral WDL    Cognitive/Neuro/Behavioral WDL WDL

## 2025-04-25 NOTE — DISCHARGE INSTRUCTIONS
I would like you to start taking 25 mg of chlorthalidone daily to help reduce your blood pressure.     Keep your follow up appointment with your primary care provider in early May.     Return to ED if you develop chest pain, shortness of breath, swelling in you legs, new headache, vision changes, or other new symptoms that you find concerning.

## 2025-05-05 ENCOUNTER — TELEPHONE (OUTPATIENT)
Dept: MEDSURG UNIT | Facility: CLINIC | Age: 75
End: 2025-05-05
Payer: COMMERCIAL

## 2025-05-06 ENCOUNTER — HOSPITAL ENCOUNTER (OUTPATIENT)
Dept: CT IMAGING | Facility: CLINIC | Age: 75
Discharge: HOME | End: 2025-05-06
Attending: INTERNAL MEDICINE | Admitting: INTERNAL MEDICINE
Payer: COMMERCIAL

## 2025-05-06 ENCOUNTER — HOSPITAL ENCOUNTER (OUTPATIENT)
Facility: CLINIC | Age: 75
Discharge: HOME | End: 2025-05-06
Admitting: INTERNAL MEDICINE
Payer: COMMERCIAL

## 2025-05-06 VITALS
WEIGHT: 175 LBS | OXYGEN SATURATION: 97 % | HEIGHT: 68 IN | BODY MASS INDEX: 26.52 KG/M2 | DIASTOLIC BLOOD PRESSURE: 74 MMHG | TEMPERATURE: 97.6 F | HEART RATE: 55 BPM | RESPIRATION RATE: 18 BRPM | SYSTOLIC BLOOD PRESSURE: 176 MMHG

## 2025-05-06 DIAGNOSIS — I65.23 BILATERAL CAROTID ARTERY STENOSIS: ICD-10-CM

## 2025-05-06 DIAGNOSIS — N18.30 STAGE 3 CHRONIC KIDNEY DISEASE, UNSPECIFIED WHETHER STAGE 3A OR 3B CKD (H): Primary | ICD-10-CM

## 2025-05-06 DIAGNOSIS — I71.40 ABDOMINAL AORTIC ANEURYSM (AAA) 3.0 CM TO 5.5 CM IN DIAMETER IN MALE: ICD-10-CM

## 2025-05-06 PROCEDURE — 250N000011 HC RX IP 250 OP 636: Performed by: INTERNAL MEDICINE

## 2025-05-06 PROCEDURE — 250N000009 HC RX 250: Performed by: INTERNAL MEDICINE

## 2025-05-06 PROCEDURE — 258N000003 HC RX IP 258 OP 636: Performed by: INTERNAL MEDICINE

## 2025-05-06 PROCEDURE — 96361 HYDRATE IV INFUSION ADD-ON: CPT

## 2025-05-06 PROCEDURE — 74174 CTA ABD&PLVS W/CONTRAST: CPT

## 2025-05-06 PROCEDURE — 999N000154 HC STATISTIC RADIOLOGY XRAY, US, CT, MAR, NM

## 2025-05-06 RX ORDER — SODIUM CHLORIDE 9 MG/ML
INJECTION, SOLUTION INTRAVENOUS CONTINUOUS
Status: DISCONTINUED | OUTPATIENT
Start: 2025-05-06 | End: 2025-05-06 | Stop reason: HOSPADM

## 2025-05-06 RX ORDER — IOPAMIDOL 755 MG/ML
72 INJECTION, SOLUTION INTRAVASCULAR ONCE
Status: COMPLETED | OUTPATIENT
Start: 2025-05-06 | End: 2025-05-06

## 2025-05-06 RX ADMIN — SODIUM CHLORIDE: 0.9 INJECTION, SOLUTION INTRAVENOUS at 11:23

## 2025-05-06 RX ADMIN — IOPAMIDOL 72 ML: 755 INJECTION, SOLUTION INTRAVENOUS at 12:32

## 2025-05-06 RX ADMIN — SODIUM CHLORIDE 80 ML: 9 INJECTION, SOLUTION INTRAVENOUS at 12:49

## 2025-05-06 ASSESSMENT — ACTIVITIES OF DAILY LIVING (ADL)
ADLS_ACUITY_SCORE: 44

## 2025-05-06 NOTE — PROGRESS NOTES
This RN called dr. Feliciano's clinic to clarify IV fluid orders. Per Farida pt is ok to get 3 hours total of IVF.

## 2025-05-06 NOTE — PROGRESS NOTES
Care Suites Discharge Nursing Note    Patient Information  Name: Bruno Baig  Age: 74 year old    Discharge Education:  Discharge instructions reviewed: Yes  Additional education/resources provided:   Patient/patient representative verbalizes understanding: Yes  Patient discharging on new medications: N/A  Medication education completed: N/A    Discharge Plans:   Discharge location: home  Discharge ride contacted: N/A  Approximate discharge time: 1500    Discharge Criteria:  Discharge criteria met and vital signs stable: Yes    Patient Belongs:  Patient belongings returned to patient: Yes    Ana Grimaldo RN

## 2025-05-06 NOTE — PROGRESS NOTES
Care Suites Admission Nursing Note    Patient Information  Name: Bruno Baig  Age: 74 year old  Reason for admission: IV Hydration CT  Care Suites arrival time: 1110         Patient Admission/Assessment   Pre-procedure assessment complete: Yes  If abnormal assessment/labs, provider notified: N/A  NPO: N/A  Medications held per instructions/orders: N/A  Consent: deferred  If applicable, pregnancy test status: deferred  Patient oriented to room: Yes  Education/questions answered: Yes  Plan/other: Begin Hydration    Discharge Planning    Discharge location: [place]    Gennaro Mclean RN

## 2025-05-08 ENCOUNTER — LAB (OUTPATIENT)
Dept: LAB | Facility: CLINIC | Age: 75
End: 2025-05-08
Attending: INTERNAL MEDICINE
Payer: COMMERCIAL

## 2025-05-08 DIAGNOSIS — E78.5 HYPERLIPIDEMIA LDL GOAL <70: ICD-10-CM

## 2025-05-08 DIAGNOSIS — R73.01 IFG (IMPAIRED FASTING GLUCOSE): ICD-10-CM

## 2025-05-08 DIAGNOSIS — E78.1 HYPERTRIGLYCERIDEMIA: ICD-10-CM

## 2025-05-13 ENCOUNTER — LAB (OUTPATIENT)
Dept: LAB | Facility: CLINIC | Age: 75
End: 2025-05-13
Payer: COMMERCIAL

## 2025-05-13 DIAGNOSIS — E78.5 HYPERLIPIDEMIA LDL GOAL <70: ICD-10-CM

## 2025-05-13 DIAGNOSIS — E78.1 HYPERTRIGLYCERIDEMIA: ICD-10-CM

## 2025-05-13 DIAGNOSIS — R73.01 IFG (IMPAIRED FASTING GLUCOSE): ICD-10-CM

## 2025-05-13 LAB
ALBUMIN SERPL BCG-MCNC: 4 G/DL (ref 3.5–5.2)
ALP SERPL-CCNC: 84 U/L (ref 40–150)
ALT SERPL W P-5'-P-CCNC: 21 U/L (ref 0–70)
ANION GAP SERPL CALCULATED.3IONS-SCNC: 12 MMOL/L (ref 7–15)
APO A-I SERPL-MCNC: 60 MG/DL
AST SERPL W P-5'-P-CCNC: 33 U/L (ref 0–45)
BILIRUB SERPL-MCNC: 0.6 MG/DL
BUN SERPL-MCNC: 35.3 MG/DL (ref 8–23)
CALCIUM SERPL-MCNC: 10.6 MG/DL (ref 8.8–10.4)
CHLORIDE SERPL-SCNC: 100 MMOL/L (ref 98–107)
CREAT SERPL-MCNC: 2.03 MG/DL (ref 0.67–1.17)
CRP SERPL HS-MCNC: 2.87 MG/L
EGFRCR SERPLBLD CKD-EPI 2021: 33 ML/MIN/1.73M2
GLUCOSE SERPL-MCNC: 126 MG/DL (ref 70–99)
HCO3 SERPL-SCNC: 26 MMOL/L (ref 22–29)
POTASSIUM SERPL-SCNC: 3.3 MMOL/L (ref 3.4–5.3)
PROT SERPL-MCNC: 7 G/DL (ref 6.4–8.3)
SODIUM SERPL-SCNC: 138 MMOL/L (ref 135–145)

## 2025-05-13 PROCEDURE — 99000 SPECIMEN HANDLING OFFICE-LAB: CPT

## 2025-05-13 PROCEDURE — 86141 C-REACTIVE PROTEIN HS: CPT

## 2025-05-13 PROCEDURE — 80053 COMPREHEN METABOLIC PANEL: CPT

## 2025-05-13 PROCEDURE — 83704 LIPOPROTEIN BLD QUAN PART: CPT | Mod: 90

## 2025-05-13 PROCEDURE — 80061 LIPID PANEL: CPT | Mod: 90

## 2025-05-13 PROCEDURE — 36415 COLL VENOUS BLD VENIPUNCTURE: CPT

## 2025-05-13 PROCEDURE — 83695 ASSAY OF LIPOPROTEIN(A): CPT

## 2025-05-19 ENCOUNTER — VIRTUAL VISIT (OUTPATIENT)
Dept: OTHER | Facility: CLINIC | Age: 75
End: 2025-05-19
Attending: INTERNAL MEDICINE
Payer: COMMERCIAL

## 2025-05-19 DIAGNOSIS — I70.1 RENAL ARTERY STENOSIS: ICD-10-CM

## 2025-05-19 DIAGNOSIS — I71.40 ABDOMINAL AORTIC ANEURYSM (AAA) 3.0 CM TO 5.5 CM IN DIAMETER IN MALE: Primary | ICD-10-CM

## 2025-05-19 DIAGNOSIS — I65.23 BILATERAL CAROTID ARTERY STENOSIS: ICD-10-CM

## 2025-05-19 DIAGNOSIS — I10 ESSENTIAL HYPERTENSION: ICD-10-CM

## 2025-05-19 DIAGNOSIS — E87.6 HYPOKALEMIA: ICD-10-CM

## 2025-05-19 DIAGNOSIS — E78.5 HYPERLIPIDEMIA LDL GOAL <70: ICD-10-CM

## 2025-05-19 DIAGNOSIS — E83.52 HYPERCALCEMIA: ICD-10-CM

## 2025-05-19 PROCEDURE — 98006 SYNCH AUDIO-VIDEO EST MOD 30: CPT | Performed by: INTERNAL MEDICINE

## 2025-05-19 RX ORDER — HYDRALAZINE HYDROCHLORIDE 25 MG/1
25 TABLET, FILM COATED ORAL 3 TIMES DAILY
Qty: 90 TABLET | Refills: 1 | Status: SHIPPED | OUTPATIENT
Start: 2025-05-19

## 2025-05-19 RX ORDER — POTASSIUM CHLORIDE 750 MG/1
10 TABLET, EXTENDED RELEASE ORAL 2 TIMES DAILY
Qty: 90 TABLET | Refills: 3 | Status: SHIPPED | OUTPATIENT
Start: 2025-05-19

## 2025-05-19 NOTE — PROGRESS NOTES
VASCULAR MEDICAL ASSESSMENT  REFERRAL SOURCE: Dr. Charisse Lopez for renal artery stenosis.   REASON FOR CONSULT: for renal artery stenosis, h/o AAA, Lt CEA, former tobacco use         A/P:                 (I70.1) Renal artery stenosis    Comment: His renal resistive indices imply he would likely respond to renal artery stenting on the right, but less likely on the left. There are 2 left renal arteries. The main left renal artery demonstrates severe, greater than 95%, near occlusive stenosis proximally near the ostium on CTA. There is associated delayed nephrogram involving the mid and upper pole the left kidney.. We do not know if he even needs this however as we have no real world BP data on the patient. His BP was quite high here previously but he had had a stressful drive in . He has no headaches, visual changes, nausea, confusion. He is told that if he develops those sxs with an SBP > 180 he needs to go to the ED. He states his SBP at home is 150. He went to the ED the next day and was prescribed chlorthalidone. At home his BP runs 225853/ on amlodipine 5 mg,  metoprolol 25 BID, chlorthalidone 25 mg daily. His HR is 55. K is low. Calcium and Cr increased on chlorthalidone. Goal BP < 120/70 if possible due to AAA. See that below. At present benefits of chlorthalidone exceed risk despite the above.   Plan: Exhaust pharmacologic options prior to proceeding to stenting or denervation. Start K supplementation. Check hypercalcemia w/u.     (I71.40) Abdominal aortic aneurysm (AAA) 3.0 cm to 5.5 cm in diameter in male  Comment: This is growing. On CTA this is measuring 4.7 x 4.3 cm maximally. Previously, on 6/5/2019, the abdominal aortic aneurysm measured 3.7 x 3.3 cm. The aneurysm also demonstrates a moderate amount of mural thrombus which is increased from the prior study.  Plan: CTA Abdomen Pelvis with Contrast in six months to document rapid growth or lack thereof.        Check the above, RTC one week later.  May need prehydration.         (I65.23) Bilateral carotid artery stenosis  (primary encounter diagnosis)  Comment: surveillance monitoring though Dr. Porter.   Plan: Carotid duplex in 12/2024.        (Z72.0) Tobacco abuse  Plan: He has stopped.        (E78.5) Hyperlipidemia LDL goal <70  Comment: Labs drawn but pending.   Plan: Await NMR results.             (E78.1) Hypertriglyceridemia  Comment: Labs drawn but pending.   Plan: Await NMR results.             (M10.00) Idiopathic gout, unspecified chronicity, unspecified site  Comment: This limits our ability to use Nexletol in the future for hyperlipidemia treatment if needed.            (I10) Essential hypertension  Comment: See above regarding NASIR.  Plan: As above.        (R73.01) IFG (impaired fasting glucose)  Comment: Check the below.  Plan: Comprehensive metabolic panel                   The longitudinal care of plan for Bruno was addressed during this visit. Due to added complexity of care, we will continue to support Bruno Baig  and the subsequent management of these conditions and with ongoing continuity of care for these conditions.       30 minutes total medical care on today's date.    MD location: office  Pt location: home    Phone call start: 15:20  Phone call end: 15:50          HPI: Bruno Baig is a 74 year old male with a h/o gout, htn, HLD, IFG, hyperTGemia, and stage 3 CKD.. The patient presents today to address htn with recently discovered NASIR on renal duplex. He recently stopped smoking, His Rt RAR index is < 0.8. His left RAR index is up to 0.9. His BP is markedly elevated here today. He had a stressful drive through [facility] during rush hour. He normal has aBP which although elevated is much lower at 150/90. He is currently on a beta blocker and calcium blocker and is bradycardic in the mid to high 50s. He is tolerating this without difficulty. Lisinopril, losartan and hydrochlorothiazide stopped in past due to NOEL/side  effects.      Review Of Systems  Skin: negative  Eyes: negative  Ears/Nose/Throat: negative  Respiratory: No shortness of breath, dyspnea on exertion, cough, or hemoptysis  Cardiovascular: negative  Gastrointestinal: negative  Genitourinary: negative  Musculoskeletal: negative  Neurologic: negative  Psychiatric: negative  Hematologic/Lymphatic/Immunologic: negative  Endocrine: negative        PAST MEDICAL HISTORY:                No past medical history on file.    PAST SURGICAL HISTORY:                  Past Surgical History:   Procedure Laterality Date    COLONOSCOPY N/A 11/9/2017    Procedure: COLONOSCOPY;  colonoscopy;  Surgeon: Justice Horner MD;  Location: [facility]    ENDARTERECTOMY CAROTID Left 7/13/2023    Procedure: LEFT CAROTID ENDARTERECTOMY with pericardial patch angioplasty and NASAL INTUBATION AND ELECTROENCEPHALOGRAM;  Surgeon: Enzo Amanda MD;  Location:  OR       CURRENT MEDICATIONS:                  Current Outpatient Medications   Medication Sig Dispense Refill    allopurinol (ZYLOPRIM) 100 MG tablet Take 0.5 tablets (50 mg) by mouth daily. 45 tablet 3    amLODIPine (NORVASC) 5 MG tablet Take 1 tablet (5 mg) by mouth daily. 90 tablet 3    aspirin 81 MG EC tablet Take 1 tablet (81 mg) by mouth daily 60 tablet 1    atorvastatin (LIPITOR) 40 MG tablet Take 2 tablets (80 mg) by mouth daily. 180 tablet 3    chlorthalidone (HYGROTON) 25 MG tablet Take 1 tablet (25 mg) by mouth daily. 30 tablet 0    Cholecalciferol (VITAMIN D3) 2000 units CAPS Take 1 capsule by mouth every morning       Cyanocobalamin (B-12) 1000 MCG CAPS Take 1 capsule by mouth daily      metoprolol tartrate (LOPRESSOR) 25 MG tablet Take 1 tablet (25 mg) by mouth 2 times daily. 180 tablet 3    omega-3 acid ethyl esters (LOVAZA) 1 g capsule Take 2 capsules by mouth 2 times daily. 360 capsule 3       ALLERGIES:                No Known Allergies    SOCIAL HISTORY:                  Social History     Socioeconomic History    Marital  status:      Spouse name: Not on file    Number of children: Not on file    Years of education: Not on file    Highest education level: Not on file   Occupational History    Not on file   Tobacco Use    Smoking status: Every Day     Current packs/day: 0.00     Average packs/day: 0.5 packs/day for 30.0 years (15.0 ttl pk-yrs)     Types: Cigarettes     Start date: 1993     Last attempt to quit: 2023     Years since quittin.8    Smokeless tobacco: Never    Tobacco comments:     1/2 pack daily    Vaping Use    Vaping status: Never Used   Substance and Sexual Activity    Alcohol use: Yes     Comment: 1-2 daily    Drug use: No    Sexual activity: Yes     Partners: Female   Other Topics Concern    Parent/sibling w/ CABG, MI or angioplasty before 65F 55M? No   Social History Narrative    Not on file     Social Drivers of Health     Financial Resource Strain: Low Risk  (2024)    Financial Resource Strain     Within the past 12 months, have you or your family members you live with been unable to get utilities (heat, electricity) when it was really needed?: No   Food Insecurity: Low Risk  (2024)    Food Insecurity     Within the past 12 months, did you worry that your food would run out before you got money to buy more?: No     Within the past 12 months, did the food you bought just not last and you didn t have money to get more?: No   Transportation Needs: Low Risk  (2024)    Transportation Needs     Within the past 12 months, has lack of transportation kept you from medical appointments, getting your medicines, non-medical meetings or appointments, work, or from getting things that you need?: No   Physical Activity: Unknown (2024)    Exercise Vital Sign     Days of Exercise per Week: 0 days     Minutes of Exercise per Session: Not on file   Stress: No Stress Concern Present (2024)    [employer] [place] [employer] - Occupational Stress Questionnaire     Feeling of Stress  : Not at all   Social Connections: Unknown (6/26/2024)    Social Connection and Isolation Panel [NHANES]     Frequency of Communication with Friends and Family: Not on file     Frequency of Social Gatherings with Friends and Family: Twice a week     Attends Zoroastrianism Services: Not on file     Active Member of Clubs or Organizations: Not on file     Attends Club or Organization Meetings: Not on file     Marital Status: Not on file   Interpersonal Safety: Low Risk  (5/6/2025)    Interpersonal Safety     Do you feel physically and emotionally safe where you currently live?: Yes     Within the past 12 months, have you been hit, slapped, kicked or otherwise physically hurt by someone?: No     Within the past 12 months, have you been humiliated or emotionally abused in other ways by your partner or ex-partner?: No   Housing Stability: Low Risk  (6/26/2024)    Housing Stability     Do you have housing? : Yes     Are you worried about losing your housing?: No       FAMILY HISTORY:                   Family History   Problem Relation Age of Onset    Other Cancer Mother         lung    Heart Failure Father     Lung Cancer Sister                Physical exam was not undertaken as this was a billable video visit.              All relevant labs and imaging reviewed by myself on today's date.

## 2025-05-19 NOTE — PROGRESS NOTES
Virtual Visit Details    Type of service:  Video Visit   Video Start Time: See MD note.  Video End Time:See MD note.    Originating Location (pt. Location): Home    Distant Location (provider location):  On-site  Platform used for Video Visit: Cesia

## 2025-05-20 LAB
CHOLEST SERPL-MCNC: 180 MG/DL
HDL SERPL QN: ABNORMAL NM
HDL SERPL-SCNC: ABNORMAL UMOL/L
HDLC SERPL-MCNC: 38 MG/DL
HLD.LARGE SERPL-SCNC: ABNORMAL UMOL/L
LDL SERPL QN: ABNORMAL NM
LDL SERPL-SCNC: ABNORMAL NMOL/L
LDL SMALL SERPL-SCNC: ABNORMAL NMOL/L
LDLC SERPL CALC-MCNC: 89 MG/DL
PATHOLOGY STUDY: ABNORMAL
TRIGL SERPL-MCNC: 264 MG/DL
VLDL LARGE SERPL-SCNC: ABNORMAL NMOL/L
VLDL SERPL QN: ABNORMAL NM

## 2025-05-21 ENCOUNTER — RESULTS FOLLOW-UP (OUTPATIENT)
Dept: OTHER | Facility: CLINIC | Age: 75
End: 2025-05-21

## 2025-05-22 ENCOUNTER — TELEPHONE (OUTPATIENT)
Dept: OTHER | Facility: CLINIC | Age: 75
End: 2025-05-22
Payer: COMMERCIAL

## 2025-05-22 DIAGNOSIS — I65.23 BILATERAL CAROTID ARTERY STENOSIS: ICD-10-CM

## 2025-05-22 DIAGNOSIS — I71.40 ABDOMINAL AORTIC ANEURYSM (AAA) 3.0 CM TO 5.5 CM IN DIAMETER IN MALE: Primary | ICD-10-CM

## 2025-05-22 DIAGNOSIS — E78.5 HYPERLIPIDEMIA LDL GOAL <70: ICD-10-CM

## 2025-05-22 DIAGNOSIS — I10 ESSENTIAL HYPERTENSION: ICD-10-CM

## 2025-05-22 DIAGNOSIS — I70.1 RENAL ARTERY STENOSIS: ICD-10-CM

## 2025-05-22 NOTE — TELEPHONE ENCOUNTER
Routing to scheduling to coordinate the following:    Non-fasting labs   CTA abdomen pelvis   In person follow up 1 week after tests  Please schedule this in 2 weeks     Appt note: Follow up to 5/19/25    Farida RODRIGUEZ, RN    [facility]  Office: [phone]  Fax: [phone]

## 2025-05-28 ENCOUNTER — PATIENT OUTREACH (OUTPATIENT)
Dept: CARE COORDINATION | Facility: CLINIC | Age: 75
End: 2025-05-28
Payer: COMMERCIAL

## 2025-05-28 NOTE — TELEPHONE ENCOUNTER
Patient scheduling CT in [place], will call back to schedule labs and follow up with Dr. Feliciano

## 2025-05-28 NOTE — TELEPHONE ENCOUNTER
Renal function was not discussed with the patient per my recollection. With this Cr/GFR, yes I would want IV prehydration. Please arrange.

## 2025-05-28 NOTE — TELEPHONE ENCOUNTER
Patient asking if follow up appointment with Dr. Feliciano can be virtual as patient lives 60+ miles away

## 2025-05-28 NOTE — TELEPHONE ENCOUNTER
Routing to Dr Feliciano to review patients renal function- most recent GFR is 33 5/13/25    Please advise if patient needs IV hydration- 3 hours? Was this discussed with patient ?   Can patients follow up be virtual ?    Farida RODRIGUEZ, RN    [facility]  Office: [phone]  Fax: [phone]

## 2025-05-28 NOTE — TELEPHONE ENCOUNTER
After reviewing chart discussed with Dr Feliciano that patient just completed CTA on 5/6/25 and gold sheet stated patient needed CTA now and then in 6 months. Dr Feliciano stated patient does not need CTA now and it should only be in 6 months. Writer attempted to call patient with no answer to relay that CTA is needed in 6 months and not now. Patient should keep lab appointment and see Dr Feliciano 1 week after labs.    Routing to scheduling to contact patient and cancel CTA appointment because of the above and schedule a follow up for after lab appt that is already scheduled on  6/9/25    Farida RODRIGUEZ, RN    [facility]  [facility]  Office: [phone]  Fax: [phone]

## 2025-05-28 NOTE — TELEPHONE ENCOUNTER
Spoke to patient and told him CT can be cancelled transferred him to central scheduling.Patient will call us back to schedule his virtual appointment.

## 2025-06-09 ENCOUNTER — LAB (OUTPATIENT)
Dept: LAB | Facility: CLINIC | Age: 75
End: 2025-06-09
Payer: COMMERCIAL

## 2025-06-09 DIAGNOSIS — E87.6 HYPOKALEMIA: ICD-10-CM

## 2025-06-09 DIAGNOSIS — E83.52 HYPERCALCEMIA: ICD-10-CM

## 2025-06-09 DIAGNOSIS — N18.30 CKD (CHRONIC KIDNEY DISEASE) STAGE 3, GFR 30-59 ML/MIN (H): Primary | ICD-10-CM

## 2025-06-09 LAB
ALBUMIN SERPL BCG-MCNC: 4 G/DL (ref 3.5–5.2)
ALP SERPL-CCNC: 64 U/L (ref 40–150)
ALT SERPL W P-5'-P-CCNC: 24 U/L (ref 0–70)
ANION GAP SERPL CALCULATED.3IONS-SCNC: 10 MMOL/L (ref 7–15)
AST SERPL W P-5'-P-CCNC: 34 U/L (ref 0–45)
BILIRUB SERPL-MCNC: 0.8 MG/DL
BUN SERPL-MCNC: 27.3 MG/DL (ref 8–23)
CA-I BLD-MCNC: 5.3 MG/DL (ref 4.4–5.2)
CALCIUM SERPL-MCNC: 10.3 MG/DL (ref 8.8–10.4)
CHLORIDE SERPL-SCNC: 102 MMOL/L (ref 98–107)
CREAT SERPL-MCNC: 1.74 MG/DL (ref 0.67–1.17)
CREAT UR-MCNC: 72.8 MG/DL
EGFRCR SERPLBLD CKD-EPI 2021: 40 ML/MIN/1.73M2
GLUCOSE SERPL-MCNC: 111 MG/DL (ref 70–99)
HCO3 SERPL-SCNC: 23 MMOL/L (ref 22–29)
MICROALBUMIN UR-MCNC: 1271.5 MG/L
MICROALBUMIN/CREAT UR: 1746.57 MG/G CR (ref 0–17)
POTASSIUM SERPL-SCNC: 4.9 MMOL/L (ref 3.4–5.3)
PROT SERPL-MCNC: 6.7 G/DL (ref 6.4–8.3)
PTH-INTACT SERPL-MCNC: 76 PG/ML (ref 15–65)
SODIUM SERPL-SCNC: 135 MMOL/L (ref 135–145)

## 2025-06-09 PROCEDURE — 82570 ASSAY OF URINE CREATININE: CPT

## 2025-06-09 PROCEDURE — 83970 ASSAY OF PARATHORMONE: CPT

## 2025-06-09 PROCEDURE — 36415 COLL VENOUS BLD VENIPUNCTURE: CPT

## 2025-06-09 PROCEDURE — 82397 CHEMILUMINESCENT ASSAY: CPT | Mod: 90

## 2025-06-09 PROCEDURE — 82330 ASSAY OF CALCIUM: CPT

## 2025-06-09 PROCEDURE — 86334 IMMUNOFIX E-PHORESIS SERUM: CPT | Performed by: STUDENT IN AN ORGANIZED HEALTH CARE EDUCATION/TRAINING PROGRAM

## 2025-06-09 PROCEDURE — 82043 UR ALBUMIN QUANTITATIVE: CPT

## 2025-06-09 PROCEDURE — 99000 SPECIMEN HANDLING OFFICE-LAB: CPT

## 2025-06-09 PROCEDURE — 80053 COMPREHEN METABOLIC PANEL: CPT

## 2025-06-10 LAB — PROT PATTERN SERPL IFE-IMP: NORMAL

## 2025-06-11 ENCOUNTER — PATIENT OUTREACH (OUTPATIENT)
Dept: CARE COORDINATION | Facility: CLINIC | Age: 75
End: 2025-06-11
Payer: COMMERCIAL

## 2025-06-11 DIAGNOSIS — E78.5 HYPERLIPIDEMIA LDL GOAL <70: ICD-10-CM

## 2025-06-11 RX ORDER — HYDRALAZINE HYDROCHLORIDE 25 MG/1
25 TABLET, FILM COATED ORAL 3 TIMES DAILY
Qty: 270 TABLET | Refills: 1 | Status: SHIPPED | OUTPATIENT
Start: 2025-06-11

## 2025-06-11 NOTE — TELEPHONE ENCOUNTER
HYDRALAZINE 25 MG TABLET   Last Written Prescription Date:  5/19/25  Last Fill Quantity: 90,  # refills: 3   Last virtual visit: 5/19/2025 with prescribing provider:  Dr. Lucian Feliciano   Future Office Visit:      Routing refill request to provider for review/approval because:   Most recent BP less than 140/90 on record   Update to 90 day supply and associate appropriate new diagnosis code.    Med and pharm loaded.    Lety Oliva, JAILYNN, RN, CV-BC, CNOR  [facility]

## 2025-06-12 ENCOUNTER — RESULTS FOLLOW-UP (OUTPATIENT)
Dept: OTHER | Facility: CLINIC | Age: 75
End: 2025-06-12

## 2025-06-12 LAB — PTH RELATED PROT SERPL-SCNC: 0.8 PMOL/L

## 2025-06-16 ENCOUNTER — VIRTUAL VISIT (OUTPATIENT)
Dept: OTHER | Facility: CLINIC | Age: 75
End: 2025-06-16
Attending: FAMILY MEDICINE
Payer: COMMERCIAL

## 2025-06-16 DIAGNOSIS — I10 ESSENTIAL HYPERTENSION: ICD-10-CM

## 2025-06-16 DIAGNOSIS — I70.1 RENAL ARTERY STENOSIS: ICD-10-CM

## 2025-06-16 DIAGNOSIS — E78.5 HYPERLIPIDEMIA LDL GOAL <70: ICD-10-CM

## 2025-06-16 DIAGNOSIS — I10 BENIGN ESSENTIAL HYPERTENSION: ICD-10-CM

## 2025-06-16 DIAGNOSIS — E87.6 HYPOKALEMIA: ICD-10-CM

## 2025-06-16 DIAGNOSIS — I71.40 ABDOMINAL AORTIC ANEURYSM (AAA) 3.0 CM TO 5.5 CM IN DIAMETER IN MALE: ICD-10-CM

## 2025-06-16 DIAGNOSIS — E78.5 HYPERLIPIDEMIA LDL GOAL <100: ICD-10-CM

## 2025-06-16 DIAGNOSIS — E78.1 HYPERTRIGLYCERIDEMIA: ICD-10-CM

## 2025-06-16 DIAGNOSIS — I65.23 BILATERAL CAROTID ARTERY STENOSIS: ICD-10-CM

## 2025-06-16 DIAGNOSIS — M1A.9XX0 CHRONIC GOUT WITHOUT TOPHUS, UNSPECIFIED CAUSE, UNSPECIFIED SITE: ICD-10-CM

## 2025-06-16 DIAGNOSIS — E83.52 HYPERCALCEMIA: Primary | ICD-10-CM

## 2025-06-16 PROCEDURE — 98006 SYNCH AUDIO-VIDEO EST MOD 30: CPT | Performed by: INTERNAL MEDICINE

## 2025-06-16 RX ORDER — HYDRALAZINE HYDROCHLORIDE 25 MG/1
25 TABLET, FILM COATED ORAL 3 TIMES DAILY
Qty: 270 TABLET | Refills: 1 | Status: CANCELLED | OUTPATIENT
Start: 2025-06-16

## 2025-06-16 RX ORDER — HYDRALAZINE HYDROCHLORIDE 50 MG/1
50 TABLET, FILM COATED ORAL 3 TIMES DAILY
Qty: 270 TABLET | Refills: 3 | Status: SHIPPED | OUTPATIENT
Start: 2025-06-16

## 2025-06-16 RX ORDER — METOPROLOL TARTRATE 25 MG/1
25 TABLET, FILM COATED ORAL 2 TIMES DAILY
Qty: 180 TABLET | Refills: 3 | Status: SHIPPED | OUTPATIENT
Start: 2025-06-16

## 2025-06-16 RX ORDER — OMEGA-3-ACID ETHYL ESTERS 1 G/1
2 CAPSULE, LIQUID FILLED ORAL 2 TIMES DAILY
Qty: 360 CAPSULE | Refills: 3 | Status: SHIPPED | OUTPATIENT
Start: 2025-06-16

## 2025-06-16 RX ORDER — ATORVASTATIN CALCIUM 40 MG/1
80 TABLET, FILM COATED ORAL DAILY
Qty: 180 TABLET | Refills: 3 | Status: SHIPPED | OUTPATIENT
Start: 2025-06-16

## 2025-06-16 RX ORDER — AMLODIPINE BESYLATE 5 MG/1
5 TABLET ORAL DAILY
Qty: 90 TABLET | Refills: 3 | Status: SHIPPED | OUTPATIENT
Start: 2025-06-16

## 2025-06-16 RX ORDER — POTASSIUM CHLORIDE 750 MG/1
10 TABLET, EXTENDED RELEASE ORAL 2 TIMES DAILY
Qty: 90 TABLET | Refills: 3 | Status: SHIPPED | OUTPATIENT
Start: 2025-06-16

## 2025-06-16 RX ORDER — ALLOPURINOL 100 MG/1
50 TABLET ORAL DAILY
Qty: 45 TABLET | Refills: 3 | Status: SHIPPED | OUTPATIENT
Start: 2025-06-16

## 2025-06-16 NOTE — PROGRESS NOTES
VASCULAR MEDICAL ASSESSMENT  REFERRAL SOURCE: Dr. Charisse Lopez for renal artery stenosis.   REASON FOR CONSULT: for renal artery stenosis, h/o AAA, Lt CEA, former tobacco use           A/P:                 (I70.1) Renal artery stenosis     Comment: His renal resistive indices imply he would likely respond to renal artery stenting on the right, but less likely on the left. There are 2 left renal arteries. The main left renal artery demonstrates severe, greater than 95%, near occlusive stenosis proximally near the ostium on CTA. There is associated delayed nephrogram involving the mid and upper pole the left kidney.. We do not know if he even needs this however as we have no real world BP data on the patient. His BP was quite high here previously but he had had a stressful drive in . He has no headaches, visual changes, nausea, confusion. He is told that if he develops those sxs with an SBP > 180 he needs to go to the ED. He states his SBP at home is 150. He went to the ED the next day and was prescribed chlorthalidone. At home his BP runs 213031/ on amlodipine 5 mg,  metoprolol 25 BID, chlorthalidone 25 mg daily. His HR is 55. K is low while on chlorthalidone but he ran out of that. . Calcium and Cr increased on chlorthalidone. Goal BP < 120/70 if possible due to AAA. See that below. At present  risk  of chlorthalidone exceed benefits despite the above. BP on hydralazine was 136/87.    Plan: Continue  hydralazine but increase from 25 to 50 mg PO TID. Stay off of chlorthalidone due to hypercalcemia, increased Cr, increased ionized calcium. Exhaust pharmacologic options prior to proceeding to stenting or denervation. Continue K supplementation. Repeat  hypercalcemia w/u off of chlorthalidone.     (I71.40) Abdominal aortic aneurysm (AAA) 3.0 cm to 5.5 cm in diameter in male  Comment: This is growing. On CTA this is measuring 4.7 x 4.3 cm maximally. Previously, on 6/5/2019, the abdominal aortic aneurysm measured  3.7 x 3.3 cm. The aneurysm also demonstrates a moderate amount of mural thrombus which is increased from the prior study.  Plan: CTA Abdomen Pelvis with Contrast in 11/2025 to document rapid growth or lack thereof.        Check the above, RTC one week later. May need prehydration.         (I65.23) Bilateral carotid artery stenosis  (primary encounter diagnosis)  Comment: surveillance monitoring though Dr. Porter.   Plan: Carotid duplex in 12/2025.        (Z72.0) Tobacco abuse  Plan: He has stopped.        (E78.5) Hyperlipidemia LDL goal <70  Comment: Hyperchylomicronemia noted.  Plan: Repeat NMR results.             (E78.1) Hypertriglyceridemia  Comment: Labs drawn but pending.   Plan: Repeat NMR results.             (M10.00) Idiopathic gout, unspecified chronicity, unspecified site  Comment: This limits our ability to use Nexletol in the future for hyperlipidemia treatment if needed.            (I10) Essential hypertension  Comment: See above regarding NASIR.  Plan: As above.        (R73.01) IFG (impaired fasting glucose)  Comment: Check the below.  Plan: Comprehensive metabolic panel                   The longitudinal care of plan for Bruno was addressed during this visit. Due to added complexity of care, we will continue to support Bruno Baig  and the subsequent management of these conditions and with ongoing continuity of care for these conditions.               33 minutes total medical care on today's date.     MD location: office  Pt location: home     Video call start: 16:21  Video  call end: 16:54       Doximity        HPI: Bruno Baig is a 74 year old male with a h/o gout, htn, HLD, IFG, hyperTGemia, and stage 3 CKD. The patient presents today to address htn with recently discovered NASIR on renal duplex. He recently stopped smoking, His Rt RAR index is < 0.8. His left RAR index is up to 0.9. His BP is markedly elevated here today. He had a stressful drive through [facility] during rush hour  when seen on 4/23/25. He is currently on a beta blocker and calcium blocker and is bradycardic in the mid to high 50s. He is tolerating this without difficulty. Lisinopril, losartan and hydrochlorothiazide stopped in past due to NOEL/side effects. On chlorthalidone has developed worsening of stage 3 CKD . One year ago Cr was 1.2. It is most recently 1.73.         Review Of Systems  Skin: negative  Eyes: negative  Ears/Nose/Throat: negative  Respiratory: No shortness of breath, dyspnea on exertion, cough, or hemoptysis  Cardiovascular: negative  Gastrointestinal: negative  Genitourinary: negative  Musculoskeletal: negative  Neurologic: negative  Psychiatric: negative  Hematologic/Lymphatic/Immunologic: negative  Endocrine: negative          PAST MEDICAL HISTORY:                No past medical history on file.    PAST SURGICAL HISTORY:                  Past Surgical History:   Procedure Laterality Date    COLONOSCOPY N/A 11/9/2017    Procedure: COLONOSCOPY;  colonoscopy;  Surgeon: Justice Horner MD;  Location: [facility]    ENDARTERECTOMY CAROTID Left 7/13/2023    Procedure: LEFT CAROTID ENDARTERECTOMY with pericardial patch angioplasty and NASAL INTUBATION AND ELECTROENCEPHALOGRAM;  Surgeon: Enzo Amanda MD;  Location:  OR       CURRENT MEDICATIONS:                  Current Outpatient Medications   Medication Sig Dispense Refill    allopurinol (ZYLOPRIM) 100 MG tablet Take 0.5 tablets (50 mg) by mouth daily. 45 tablet 3    amLODIPine (NORVASC) 5 MG tablet Take 1 tablet (5 mg) by mouth daily. 90 tablet 3    aspirin 81 MG EC tablet Take 1 tablet (81 mg) by mouth daily 60 tablet 1    atorvastatin (LIPITOR) 40 MG tablet Take 2 tablets (80 mg) by mouth daily. 180 tablet 3    Cholecalciferol (VITAMIN D3) 2000 units CAPS Take 1 capsule by mouth every morning       Cyanocobalamin (B-12) 1000 MCG CAPS Take 1 capsule by mouth daily      hydrALAZINE (APRESOLINE) 25 MG tablet TAKE 1 TABLET (25 MG) BY MOUTH 3 TIMES DAILY 270  tablet 1    metoprolol tartrate (LOPRESSOR) 25 MG tablet Take 1 tablet (25 mg) by mouth 2 times daily. 180 tablet 3    omega-3 acid ethyl esters (LOVAZA) 1 g capsule Take 2 capsules by mouth 2 times daily. 360 capsule 3    potassium chloride ER (K-TAB/KLOR-CON) 10 MEQ CR tablet Take 1 tablet (10 mEq) by mouth 2 times daily. 90 tablet 3       ALLERGIES:                No Known Allergies    SOCIAL HISTORY:                  Social History     Socioeconomic History    Marital status:      Spouse name: Not on file    Number of children: Not on file    Years of education: Not on file    Highest education level: Not on file   Occupational History    Not on file   Tobacco Use    Smoking status: Every Day     Current packs/day: 0.00     Average packs/day: 0.5 packs/day for 30.0 years (15.0 ttl pk-yrs)     Types: Cigarettes     Start date: 1993     Last attempt to quit: 2023     Years since quittin.9    Smokeless tobacco: Never    Tobacco comments:     1/2 pack daily    Vaping Use    Vaping status: Never Used   Substance and Sexual Activity    Alcohol use: Yes     Comment: 1-2 daily    Drug use: No    Sexual activity: Yes     Partners: Female   Other Topics Concern    Parent/sibling w/ CABG, MI or angioplasty before 65F 55M? No   Social History Narrative    Not on file     Social Drivers of Health     Financial Resource Strain: Low Risk  (2024)    Financial Resource Strain     Within the past 12 months, have you or your family members you live with been unable to get utilities (heat, electricity) when it was really needed?: No   Food Insecurity: Low Risk  (2024)    Food Insecurity     Within the past 12 months, did you worry that your food would run out before you got money to buy more?: No     Within the past 12 months, did the food you bought just not last and you didn t have money to get more?: No   Transportation Needs: Low Risk  (2024)    Transportation Needs     Within the past 12  months, has lack of transportation kept you from medical appointments, getting your medicines, non-medical meetings or appointments, work, or from getting things that you need?: No   Physical Activity: Unknown (6/26/2024)    Exercise Vital Sign     Days of Exercise per Week: 0 days     Minutes of Exercise per Session: Not on file   Stress: No Stress Concern Present (6/26/2024)    [employer] [place] [employer] - Occupational Stress Questionnaire     Feeling of Stress : Not at all   Social Connections: Unknown (6/26/2024)    Social Connection and Isolation Panel [NHANES]     Frequency of Communication with Friends and Family: Not on file     Frequency of Social Gatherings with Friends and Family: Twice a week     Attends Jewish Services: Not on file     Active Member of Clubs or Organizations: Not on file     Attends Club or Organization Meetings: Not on file     Marital Status: Not on file   Interpersonal Safety: Low Risk  (5/6/2025)    Interpersonal Safety     Do you feel physically and emotionally safe where you currently live?: Yes     Within the past 12 months, have you been hit, slapped, kicked or otherwise physically hurt by someone?: No     Within the past 12 months, have you been humiliated or emotionally abused in other ways by your partner or ex-partner?: No   Housing Stability: Low Risk  (6/26/2024)    Housing Stability     Do you have housing? : Yes     Are you worried about losing your housing?: No       FAMILY HISTORY:                   Family History   Problem Relation Age of Onset    Other Cancer Mother         lung    Heart Failure Father     Lung Cancer Sister                    Physical exam was not undertaken as this was a billable video visit.            All relevant labs and imaging reviewed by myself on today's date.

## 2025-06-17 ENCOUNTER — TELEPHONE (OUTPATIENT)
Dept: OTHER | Facility: CLINIC | Age: 75
End: 2025-06-17
Payer: COMMERCIAL

## 2025-06-17 ENCOUNTER — RESULTS FOLLOW-UP (OUTPATIENT)
Dept: FAMILY MEDICINE | Facility: CLINIC | Age: 75
End: 2025-06-17

## 2025-06-17 DIAGNOSIS — E78.5 HYPERLIPIDEMIA LDL GOAL <70: ICD-10-CM

## 2025-06-17 DIAGNOSIS — N18.31 STAGE 3A CHRONIC KIDNEY DISEASE (H): Primary | ICD-10-CM

## 2025-06-17 DIAGNOSIS — I71.40 ABDOMINAL AORTIC ANEURYSM (AAA) 3.0 CM TO 5.5 CM IN DIAMETER IN MALE: ICD-10-CM

## 2025-06-17 DIAGNOSIS — I10 BENIGN ESSENTIAL HYPERTENSION: Primary | ICD-10-CM

## 2025-06-17 DIAGNOSIS — E78.5 HYPERLIPIDEMIA LDL GOAL <100: ICD-10-CM

## 2025-06-17 DIAGNOSIS — I10 ESSENTIAL HYPERTENSION: ICD-10-CM

## 2025-06-17 DIAGNOSIS — I65.23 BILATERAL CAROTID ARTERY STENOSIS: ICD-10-CM

## 2025-06-17 DIAGNOSIS — I70.1 RENAL ARTERY STENOSIS: ICD-10-CM

## 2025-06-17 NOTE — TELEPHONE ENCOUNTER
Routing to scheduling to coordinate the following:    Fasting labs  Virtual follow up 2 weeks after labs  Please schedule this in 2 weeks     Appt note: Follow up to 6/16/25    Farida RODRIGUEZ, ANDREW    [facility]  Office: [phone]  Fax: [phone]

## 2025-06-23 ENCOUNTER — LAB (OUTPATIENT)
Dept: LAB | Facility: CLINIC | Age: 75
End: 2025-06-23
Payer: COMMERCIAL

## 2025-06-23 DIAGNOSIS — N18.31 STAGE 3A CHRONIC KIDNEY DISEASE (H): ICD-10-CM

## 2025-06-23 LAB
CREAT UR-MCNC: 139.8 MG/DL
MICROALBUMIN UR-MCNC: 678.6 MG/L
MICROALBUMIN/CREAT UR: 485.41 MG/G CR (ref 0–17)

## 2025-06-23 PROCEDURE — 82570 ASSAY OF URINE CREATININE: CPT

## 2025-06-23 PROCEDURE — 82043 UR ALBUMIN QUANTITATIVE: CPT

## 2025-06-25 ENCOUNTER — RESULTS FOLLOW-UP (OUTPATIENT)
Dept: FAMILY MEDICINE | Facility: CLINIC | Age: 75
End: 2025-06-25

## 2025-07-01 ENCOUNTER — LAB (OUTPATIENT)
Dept: LAB | Facility: CLINIC | Age: 75
End: 2025-07-01
Payer: COMMERCIAL

## 2025-07-01 DIAGNOSIS — E83.52 HYPERCALCEMIA: ICD-10-CM

## 2025-07-01 DIAGNOSIS — E78.5 HYPERLIPIDEMIA LDL GOAL <100: ICD-10-CM

## 2025-07-01 DIAGNOSIS — I10 ESSENTIAL HYPERTENSION: ICD-10-CM

## 2025-07-01 LAB
ALBUMIN SERPL BCG-MCNC: 3.8 G/DL (ref 3.5–5.2)
ALP SERPL-CCNC: 71 U/L (ref 40–150)
ALT SERPL W P-5'-P-CCNC: 35 U/L (ref 0–70)
ANION GAP SERPL CALCULATED.3IONS-SCNC: 11 MMOL/L (ref 7–15)
AST SERPL W P-5'-P-CCNC: 38 U/L (ref 0–45)
BILIRUB SERPL-MCNC: 0.7 MG/DL
BUN SERPL-MCNC: 23.4 MG/DL (ref 8–23)
CA-I BLD-MCNC: 5.5 MG/DL (ref 4.4–5.2)
CALCIUM SERPL-MCNC: 10.6 MG/DL (ref 8.8–10.4)
CHLORIDE SERPL-SCNC: 104 MMOL/L (ref 98–107)
CREAT SERPL-MCNC: 1.82 MG/DL (ref 0.67–1.17)
CRP SERPL HS-MCNC: 2.08 MG/L
EGFRCR SERPLBLD CKD-EPI 2021: 38 ML/MIN/1.73M2
GLUCOSE SERPL-MCNC: 122 MG/DL (ref 70–99)
HCO3 SERPL-SCNC: 21 MMOL/L (ref 22–29)
LDLC SERPL DIRECT ASSAY-MCNC: 73 MG/DL
POTASSIUM SERPL-SCNC: 4.3 MMOL/L (ref 3.4–5.3)
PROT SERPL-MCNC: 7.1 G/DL (ref 6.4–8.3)
PTH-INTACT SERPL-MCNC: 56 PG/ML (ref 15–65)
SODIUM SERPL-SCNC: 136 MMOL/L (ref 135–145)

## 2025-07-01 PROCEDURE — 86141 C-REACTIVE PROTEIN HS: CPT

## 2025-07-01 PROCEDURE — 80061 LIPID PANEL: CPT | Mod: 90

## 2025-07-01 PROCEDURE — 99000 SPECIMEN HANDLING OFFICE-LAB: CPT

## 2025-07-01 PROCEDURE — 82330 ASSAY OF CALCIUM: CPT

## 2025-07-01 PROCEDURE — 80053 COMPREHEN METABOLIC PANEL: CPT

## 2025-07-01 PROCEDURE — 36415 COLL VENOUS BLD VENIPUNCTURE: CPT

## 2025-07-01 PROCEDURE — 83970 ASSAY OF PARATHORMONE: CPT

## 2025-07-01 PROCEDURE — 83704 LIPOPROTEIN BLD QUAN PART: CPT | Mod: 90

## 2025-07-08 ENCOUNTER — OFFICE VISIT (OUTPATIENT)
Dept: FAMILY MEDICINE | Facility: CLINIC | Age: 75
End: 2025-07-08
Payer: COMMERCIAL

## 2025-07-08 VITALS
SYSTOLIC BLOOD PRESSURE: 138 MMHG | DIASTOLIC BLOOD PRESSURE: 62 MMHG | OXYGEN SATURATION: 98 % | HEIGHT: 68 IN | BODY MASS INDEX: 28.04 KG/M2 | WEIGHT: 185 LBS | RESPIRATION RATE: 14 BRPM | HEART RATE: 55 BPM | TEMPERATURE: 97.4 F

## 2025-07-08 DIAGNOSIS — E78.5 HYPERLIPIDEMIA LDL GOAL <100: ICD-10-CM

## 2025-07-08 DIAGNOSIS — I10 BENIGN ESSENTIAL HYPERTENSION: ICD-10-CM

## 2025-07-08 DIAGNOSIS — R73.03 PRE-DIABETES: ICD-10-CM

## 2025-07-08 DIAGNOSIS — Z87.891 HISTORY OF TOBACCO USE, PRESENTING HAZARDS TO HEALTH: Primary | ICD-10-CM

## 2025-07-08 DIAGNOSIS — Z00.00 ENCOUNTER FOR MEDICARE ANNUAL WELLNESS EXAM: ICD-10-CM

## 2025-07-08 DIAGNOSIS — R13.19 ESOPHAGEAL DYSPHAGIA: ICD-10-CM

## 2025-07-08 LAB
CHOLEST SERPL-MCNC: 142 MG/DL
EST. AVERAGE GLUCOSE BLD GHB EST-MCNC: 114 MG/DL
FASTING STATUS PATIENT QL REPORTED: NO
HBA1C MFR BLD: 5.6 % (ref 0–5.6)
HDLC SERPL-MCNC: 37 MG/DL
LDLC SERPL CALC-MCNC: 78 MG/DL
NONHDLC SERPL-MCNC: 105 MG/DL
TRIGL SERPL-MCNC: 134 MG/DL

## 2025-07-08 PROCEDURE — G2211 COMPLEX E/M VISIT ADD ON: HCPCS | Performed by: FAMILY MEDICINE

## 2025-07-08 PROCEDURE — 3048F LDL-C <100 MG/DL: CPT | Performed by: FAMILY MEDICINE

## 2025-07-08 PROCEDURE — 3044F HG A1C LEVEL LT 7.0%: CPT | Performed by: FAMILY MEDICINE

## 2025-07-08 PROCEDURE — 99213 OFFICE O/P EST LOW 20 MIN: CPT | Mod: 25 | Performed by: FAMILY MEDICINE

## 2025-07-08 PROCEDURE — 3075F SYST BP GE 130 - 139MM HG: CPT | Performed by: FAMILY MEDICINE

## 2025-07-08 PROCEDURE — 1126F AMNT PAIN NOTED NONE PRSNT: CPT | Performed by: FAMILY MEDICINE

## 2025-07-08 PROCEDURE — G0439 PPPS, SUBSEQ VISIT: HCPCS | Performed by: FAMILY MEDICINE

## 2025-07-08 PROCEDURE — 3078F DIAST BP <80 MM HG: CPT | Performed by: FAMILY MEDICINE

## 2025-07-08 PROCEDURE — 36415 COLL VENOUS BLD VENIPUNCTURE: CPT | Performed by: FAMILY MEDICINE

## 2025-07-08 PROCEDURE — 83036 HEMOGLOBIN GLYCOSYLATED A1C: CPT | Performed by: FAMILY MEDICINE

## 2025-07-08 PROCEDURE — 80061 LIPID PANEL: CPT | Performed by: FAMILY MEDICINE

## 2025-07-08 SDOH — HEALTH STABILITY: PHYSICAL HEALTH: ON AVERAGE, HOW MANY DAYS PER WEEK DO YOU ENGAGE IN MODERATE TO STRENUOUS EXERCISE (LIKE A BRISK WALK)?: 1 DAY

## 2025-07-08 ASSESSMENT — PAIN SCALES - GENERAL: PAINLEVEL_OUTOF10: NO PAIN (0)

## 2025-07-08 ASSESSMENT — SOCIAL DETERMINANTS OF HEALTH (SDOH): HOW OFTEN DO YOU GET TOGETHER WITH FRIENDS OR RELATIVES?: TWICE A WEEK

## 2025-07-08 NOTE — H&P (VIEW-ONLY)
"Preventive Care Visit  [facility]  Charisse Lopez MD, Family Medicine  Jul 8, 2025      Assessment & Plan     History of tobacco use, presenting hazards to health  Due for annual screening   Quit smoking a couple of months ago   - CT Chest Lung Cancer Screen Low Dose Without; Future    Pre-diabetes  Working on diet and lifestyle changes   - Hemoglobin A1c; Future  - Hemoglobin A1c    Benign essential hypertension  Stable no change in treatment plan.   - Lipid panel reflex to direct LDL Fasting; Future  - Lipid panel reflex to direct LDL Fasting    Esophageal dysphagia  Sxs for years seem to be getting worse   No reflux  Food gets stuck   - Adult GI  Referral - Procedure Only; Future    Encounter for Medicare annual wellness exam      The longitudinal plan of care for the diagnosis(es)/condition(s) as documented were addressed during this visit. Due to the added complexity in care, I will continue to support Bruno in the subsequent management and with ongoing continuity of care.    Patient has been advised of split billing requirements and indicates understanding: Yes    BMI  Estimated body mass index is 28.34 kg/m  as calculated from the following:    Height as of this encounter: 1.721 m (5' 7.75\").    Weight as of this encounter: 83.9 kg (185 lb).       Counseling  Appropriate preventive services were addressed with this patient via screening, questionnaire, or discussion as appropriate for fall prevention, nutrition, physical activity, Tobacco-use cessation, social engagement, weight loss and cognition.  Checklist reviewing preventive services available has been given to the patient.  Reviewed patient's diet, addressing concerns and/or questions.   He is at risk for lack of exercise and has been provided with information to increase physical activity for the benefit of his well-being.   He is at risk for psychosocial distress and has been provided with information to " reduce risk.   The patient reports drinking more than 3 alcoholic drinks per day and/or more than 7 drhnks per week. The patient was counseled and given information about possible harmful effects of excessive alcohol intake.Patient reported safety concerns were addressed today.The patient was provided with written information regarding signs of hearing loss.   Reviewed preventive health counseling, as reflected in patient instructions        Subjective   Bruno is a 74 year old, presenting for the following:  Wellness Visit        7/8/2025     2:50 PM   Additional Questions   Roomed by Bev FRANCISCO   Accompanied by Self         7/8/2025     2:50 PM   Patient Reported Additional Medications   Patient reports taking the following new medications .          HPI           Hyperlipidemia Follow-Up    Are you regularly taking any medication or supplement to lower your cholesterol?   Yes- Lipitor  Are you having muscle aches or other side effects that you think could be caused by your cholesterol lowering medication?  No    Hypertension Follow-up    Do you check your blood pressure regularly outside of the clinic? Yes   Are you following a low salt diet? No  Are your blood pressures ever more than 140 on the top number (systolic) OR more   than 90 on the bottom number (diastolic), for example 140/90? Yes typically stays below but some elevated readings    BP Readings from Last 2 Encounters:   07/08/25 138/62   05/06/25 (!) 176/74     Difficulty swallowing   Onset/Duration: years ago   Description: pt states if he does not chew food really well has sensation of it getting stuck in the mid chest   Has had episodes of choking and gagging to emesis   This has been going on for years and forgot to mention it   It is more common now   Intensity: moderate  Progression of Symptoms: worsening  Accompanying Signs & Symptoms:  Does it feel like food gets stuck or trouble swallowing: YES  Nausea: No  Vomiting (bloody?): No  Abdominal Pain:  No  Black-Tarry stools: No  Bloody stools: No  History:  Previous similar episodes: YES  Previous ulcers: No  Precipitating factors:   Caffeine use: YES  Alcohol use: YES  NSAID/Aspirin use: No  Tobacco use: quit but years of smoking 30+pack years   Worse with food that is not chewed does not happen with liquids .  Alleviating factors: None  Therapies tried and outcome:             Lifestyle changes: None            Medications: none  Advance Care Planning    Did not discuss         7/8/2025   General Health   How would you rate your overall physical health? Good   Feel stress (tense, anxious, or unable to sleep) To some extent   (!) STRESS CONCERN      7/8/2025   Nutrition   Diet: I don't know         7/8/2025   Exercise   Days per week of moderate/strenous exercise 1 day   (!) EXERCISE CONCERN      7/8/2025   Social Factors   Frequency of gathering with friends or relatives Twice a week   Worry food won't last until get money to buy more No   Food not last or not have enough money for food? No   Do you have housing? (Housing is defined as stable permanent housing and does not include staying outside in a car, in a tent, in an abandoned building, in an overnight shelter, or couch-surfing.) Yes   Are you worried about losing your housing? No   Lack of transportation? No   Unable to get utilities (heat,electricity)? No         7/8/2025   Fall Risk   Fallen 2 or more times in the past year? No   Trouble with walking or balance? No          7/8/2025   Activities of Daily Living- Home Safety   Needs help with the following daily activites None of the above   Safety concerns in the home No grab bars in the bathroom         7/8/2025   Dental   Dentist two times every year? Yes         7/8/2025   Hearing Screening   Hearing concerns? (!) IT'S HARD TO FOLLOW A CONVERSATION IN A NOISY RESTAURANT OR CROWDED ROOM.   Would you like a referral for hearing testing? No         7/8/2025   Driving Risk Screening   Patient/family  members have concerns about driving No         2025   General Alertness/Fatigue Screening   Have you been more tired than usual lately? No         2025   Urinary Incontinence Screening   Bothered by leaking urine in past 6 months No         Today's PHQ-2 Score:       2025     2:46 PM   PHQ-2 (  [employer])   Q1: Little interest or pleasure in doing things 0   Q2: Feeling down, depressed or hopeless 0   PHQ-2 Score 0    Q1: Little interest or pleasure in doing things Not at all   Q2: Feeling down, depressed or hopeless Not at all   PHQ-2 Score 0       Patient-reported           2025   Substance Use   Alcohol more than 3/day or more than 7/wk Yes   How often do you have a drink containing alcohol 4 or more times a week   How many alcohol drinks on typical day 1 or 2   How often do you have 5+ drinks at one occasion Less than monthly   Audit 2/3 Score 1   How often not able to stop drinking once started Never   How often failed to do what normally expected Never   How often needed first drink in am after a heavy drinking session Never   How often feeling of guilt or remorse after drinking Never   How often unable to remember what happened the night before Never   Have you or someone else been injured because of your drinking No   Has anyone been concerned or suggested you cut down on drinking No   TOTAL SCORE - AUDIT 5   Do you have a current opioid prescription? No   How severe/bad is pain from 1 to 10? 0/10 (No Pain)   Do you use any other substances recreationally? No     Social History     Tobacco Use    Smoking status: Former     Current packs/day: 0.00     Average packs/day: 0.5 packs/day for 30.0 years (15.0 ttl pk-yrs)     Types: Cigarettes     Start date: 1993     Quit date: 2023     Years since quittin.0    Smokeless tobacco: Never    Tobacco comments:     Quit    Vaping Use    Vaping status: Never Used   Substance Use Topics    Alcohol use: Yes     Comment: 1-2 daily     Drug use: No       ASCVD Risk   The 10-year ASCVD risk score (Liya LIU, et al., 2019) is: 33.3%    Values used to calculate the score:      Age: 74 years      Sex: Male      Is Non- : No      Diabetic: No      Tobacco smoker: No      Systolic Blood Pressure: 138 mmHg      Is BP treated: Yes      HDL Cholesterol: 37 mg/dL      Total Cholesterol: 142 mg/dL            Reviewed and updated as needed this visit by Provider   Tobacco  Allergies  Meds  Problems  Med Hx  Surg Hx  Fam Hx              Current providers sharing in care for this patient include:  Patient Care Team:  Charisse Lopez MD as PCP - General (Family Practice)  Charisse Lopez MD as Assigned PCP  Enzo Amanda MD as Assigned Heart and Vascular Surgical Provider  Lucian Feliciano MD as Assigned Heart and Vascular Provider    The following health maintenance items are reviewed in Epic and correct as of today:  Health Maintenance   Topic Date Due    DTAP/TDAP/TD VACCINE (1 - Tdap) Never done    RSV VACCINE (1 - 1-dose 75+ series) Never done    COVID-19 VACCINE (6 - 2024-25 season) 06/03/2025    LUNG CANCER SCREENING  07/17/2025    INFLUENZA VACCINE (1) 09/01/2025    ANNUAL REVIEW OF HM ORDERS  02/18/2026    URIC ACID  03/03/2026    HEMOGLOBIN  04/24/2026    MICROALBUMIN  06/23/2026    BMP  07/01/2026    MEDICARE ANNUAL WELLNESS VISIT  07/08/2026    LIPID  07/08/2026    FALL RISK ASSESSMENT  07/08/2026    DIABETES SCREENING  07/08/2028    ADVANCE CARE PLANNING  06/26/2029    HEPATITIS C SCREENING  Completed    PHQ-2 (once per calendar year)  Completed    PNEUMOCOCCAL VACCINE 50+ YEARS  Completed    URINALYSIS  Completed    ZOSTER VACCINE  Completed    HPV VACCINE  Aged Out    MENINGITIS VACCINE  Aged Out    COLORECTAL CANCER SCREENING  Discontinued         Review of Systems  Constitutional, HEENT, cardiovascular, pulmonary, gi and gu systems are negative, except as otherwise noted.    "  Objective    Exam  /62   Pulse 55   Temp 97.4  F (36.3  C) (Tympanic)   Resp 14   Ht 1.721 m (5' 7.75\")   Wt 83.9 kg (185 lb)   SpO2 98%   BMI 28.34 kg/m     Estimated body mass index is 28.34 kg/m  as calculated from the following:    Height as of this encounter: 1.721 m (5' 7.75\").    Weight as of this encounter: 83.9 kg (185 lb).    Physical Exam  GENERAL: alert and no distress  EYES: Eyes grossly normal to inspection, PERRL and conjunctivae and sclerae normal  HENT: ear canals and TM's normal, nose and mouth without ulcers or lesions  NECK: no adenopathy, no asymmetry, masses, or scars  RESP: lungs clear to auscultation - no rales, rhonchi or wheezes  CV: regular rate and rhythm, normal S1 S2, no S3 or S4, no murmur, click or rub, no peripheral edema  ABDOMEN: soft, nontender, no hepatosplenomegaly, no masses and bowel sounds normal  MS: no gross musculoskeletal defects noted, no edema  SKIN: no suspicious lesions or rashes  NEURO: Normal strength and tone, mentation intact and speech normal  PSYCH: mentation appears normal, affect normal/bright         7/8/2025   Mini Cog   Clock Draw Score 2 Normal   3 Item Recall 3 objects recalled   Mini Cog Total Score 5              Signed Electronically by: Charisse Lopez MD    "

## 2025-07-08 NOTE — PROGRESS NOTES
"Preventive Care Visit  [facility]  Charisse Lopez MD, Family Medicine  Jul 8, 2025      Assessment & Plan     History of tobacco use, presenting hazards to health  Due for annual screening   Quit smoking a couple of months ago   - CT Chest Lung Cancer Screen Low Dose Without; Future    Pre-diabetes  Working on diet and lifestyle changes   - Hemoglobin A1c; Future  - Hemoglobin A1c    Benign essential hypertension  Stable no change in treatment plan.   - Lipid panel reflex to direct LDL Fasting; Future  - Lipid panel reflex to direct LDL Fasting    Esophageal dysphagia  Sxs for years seem to be getting worse   No reflux  Food gets stuck   - Adult GI  Referral - Procedure Only; Future    Encounter for Medicare annual wellness exam      The longitudinal plan of care for the diagnosis(es)/condition(s) as documented were addressed during this visit. Due to the added complexity in care, I will continue to support Bruno in the subsequent management and with ongoing continuity of care.    Patient has been advised of split billing requirements and indicates understanding: Yes    BMI  Estimated body mass index is 28.34 kg/m  as calculated from the following:    Height as of this encounter: 1.721 m (5' 7.75\").    Weight as of this encounter: 83.9 kg (185 lb).       Counseling  Appropriate preventive services were addressed with this patient via screening, questionnaire, or discussion as appropriate for fall prevention, nutrition, physical activity, Tobacco-use cessation, social engagement, weight loss and cognition.  Checklist reviewing preventive services available has been given to the patient.  Reviewed patient's diet, addressing concerns and/or questions.   He is at risk for lack of exercise and has been provided with information to increase physical activity for the benefit of his well-being.   He is at risk for psychosocial distress and has been provided with information to " reduce risk.   The patient reports drinking more than 3 alcoholic drinks per day and/or more than 7 drhnks per week. The patient was counseled and given information about possible harmful effects of excessive alcohol intake.Patient reported safety concerns were addressed today.The patient was provided with written information regarding signs of hearing loss.   Reviewed preventive health counseling, as reflected in patient instructions        Subjective   Bruno is a 74 year old, presenting for the following:  Wellness Visit        7/8/2025     2:50 PM   Additional Questions   Roomed by Bev FRANCISCO   Accompanied by Self         7/8/2025     2:50 PM   Patient Reported Additional Medications   Patient reports taking the following new medications .          HPI           Hyperlipidemia Follow-Up    Are you regularly taking any medication or supplement to lower your cholesterol?   Yes- Lipitor  Are you having muscle aches or other side effects that you think could be caused by your cholesterol lowering medication?  No    Hypertension Follow-up    Do you check your blood pressure regularly outside of the clinic? Yes   Are you following a low salt diet? No  Are your blood pressures ever more than 140 on the top number (systolic) OR more   than 90 on the bottom number (diastolic), for example 140/90? Yes typically stays below but some elevated readings    BP Readings from Last 2 Encounters:   07/08/25 138/62   05/06/25 (!) 176/74     Difficulty swallowing   Onset/Duration: years ago   Description: pt states if he does not chew food really well has sensation of it getting stuck in the mid chest   Has had episodes of choking and gagging to emesis   This has been going on for years and forgot to mention it   It is more common now   Intensity: moderate  Progression of Symptoms: worsening  Accompanying Signs & Symptoms:  Does it feel like food gets stuck or trouble swallowing: YES  Nausea: No  Vomiting (bloody?): No  Abdominal Pain:  No  Black-Tarry stools: No  Bloody stools: No  History:  Previous similar episodes: YES  Previous ulcers: No  Precipitating factors:   Caffeine use: YES  Alcohol use: YES  NSAID/Aspirin use: No  Tobacco use: quit but years of smoking 30+pack years   Worse with food that is not chewed does not happen with liquids .  Alleviating factors: None  Therapies tried and outcome:             Lifestyle changes: None            Medications: none  Advance Care Planning    Did not discuss         7/8/2025   General Health   How would you rate your overall physical health? Good   Feel stress (tense, anxious, or unable to sleep) To some extent   (!) STRESS CONCERN      7/8/2025   Nutrition   Diet: I don't know         7/8/2025   Exercise   Days per week of moderate/strenous exercise 1 day   (!) EXERCISE CONCERN      7/8/2025   Social Factors   Frequency of gathering with friends or relatives Twice a week   Worry food won't last until get money to buy more No   Food not last or not have enough money for food? No   Do you have housing? (Housing is defined as stable permanent housing and does not include staying outside in a car, in a tent, in an abandoned building, in an overnight shelter, or couch-surfing.) Yes   Are you worried about losing your housing? No   Lack of transportation? No   Unable to get utilities (heat,electricity)? No         7/8/2025   Fall Risk   Fallen 2 or more times in the past year? No   Trouble with walking or balance? No          7/8/2025   Activities of Daily Living- Home Safety   Needs help with the following daily activites None of the above   Safety concerns in the home No grab bars in the bathroom         7/8/2025   Dental   Dentist two times every year? Yes         7/8/2025   Hearing Screening   Hearing concerns? (!) IT'S HARD TO FOLLOW A CONVERSATION IN A NOISY RESTAURANT OR CROWDED ROOM.   Would you like a referral for hearing testing? No         7/8/2025   Driving Risk Screening   Patient/family  members have concerns about driving No         2025   General Alertness/Fatigue Screening   Have you been more tired than usual lately? No         2025   Urinary Incontinence Screening   Bothered by leaking urine in past 6 months No         Today's PHQ-2 Score:       2025     2:46 PM   PHQ-2 (  [employer])   Q1: Little interest or pleasure in doing things 0   Q2: Feeling down, depressed or hopeless 0   PHQ-2 Score 0    Q1: Little interest or pleasure in doing things Not at all   Q2: Feeling down, depressed or hopeless Not at all   PHQ-2 Score 0       Patient-reported           2025   Substance Use   Alcohol more than 3/day or more than 7/wk Yes   How often do you have a drink containing alcohol 4 or more times a week   How many alcohol drinks on typical day 1 or 2   How often do you have 5+ drinks at one occasion Less than monthly   Audit 2/3 Score 1   How often not able to stop drinking once started Never   How often failed to do what normally expected Never   How often needed first drink in am after a heavy drinking session Never   How often feeling of guilt or remorse after drinking Never   How often unable to remember what happened the night before Never   Have you or someone else been injured because of your drinking No   Has anyone been concerned or suggested you cut down on drinking No   TOTAL SCORE - AUDIT 5   Do you have a current opioid prescription? No   How severe/bad is pain from 1 to 10? 0/10 (No Pain)   Do you use any other substances recreationally? No     Social History     Tobacco Use    Smoking status: Former     Current packs/day: 0.00     Average packs/day: 0.5 packs/day for 30.0 years (15.0 ttl pk-yrs)     Types: Cigarettes     Start date: 1993     Quit date: 2023     Years since quittin.0    Smokeless tobacco: Never    Tobacco comments:     Quit    Vaping Use    Vaping status: Never Used   Substance Use Topics    Alcohol use: Yes     Comment: 1-2 daily     Drug use: No       ASCVD Risk   The 10-year ASCVD risk score (Liya LIU, et al., 2019) is: 33.3%    Values used to calculate the score:      Age: 74 years      Sex: Male      Is Non- : No      Diabetic: No      Tobacco smoker: No      Systolic Blood Pressure: 138 mmHg      Is BP treated: Yes      HDL Cholesterol: 37 mg/dL      Total Cholesterol: 142 mg/dL            Reviewed and updated as needed this visit by Provider   Tobacco  Allergies  Meds  Problems  Med Hx  Surg Hx  Fam Hx              Current providers sharing in care for this patient include:  Patient Care Team:  Charisse Lopez MD as PCP - General (Family Practice)  Charisse Lopez MD as Assigned PCP  Enzo Amanda MD as Assigned Heart and Vascular Surgical Provider  Lucian Feliciano MD as Assigned Heart and Vascular Provider    The following health maintenance items are reviewed in Epic and correct as of today:  Health Maintenance   Topic Date Due    DTAP/TDAP/TD VACCINE (1 - Tdap) Never done    RSV VACCINE (1 - 1-dose 75+ series) Never done    COVID-19 VACCINE (6 - 2024-25 season) 06/03/2025    LUNG CANCER SCREENING  07/17/2025    INFLUENZA VACCINE (1) 09/01/2025    ANNUAL REVIEW OF HM ORDERS  02/18/2026    URIC ACID  03/03/2026    HEMOGLOBIN  04/24/2026    MICROALBUMIN  06/23/2026    BMP  07/01/2026    MEDICARE ANNUAL WELLNESS VISIT  07/08/2026    LIPID  07/08/2026    FALL RISK ASSESSMENT  07/08/2026    DIABETES SCREENING  07/08/2028    ADVANCE CARE PLANNING  06/26/2029    HEPATITIS C SCREENING  Completed    PHQ-2 (once per calendar year)  Completed    PNEUMOCOCCAL VACCINE 50+ YEARS  Completed    URINALYSIS  Completed    ZOSTER VACCINE  Completed    HPV VACCINE  Aged Out    MENINGITIS VACCINE  Aged Out    COLORECTAL CANCER SCREENING  Discontinued         Review of Systems  Constitutional, HEENT, cardiovascular, pulmonary, gi and gu systems are negative, except as otherwise noted.    "  Objective    Exam  /62   Pulse 55   Temp 97.4  F (36.3  C) (Tympanic)   Resp 14   Ht 1.721 m (5' 7.75\")   Wt 83.9 kg (185 lb)   SpO2 98%   BMI 28.34 kg/m     Estimated body mass index is 28.34 kg/m  as calculated from the following:    Height as of this encounter: 1.721 m (5' 7.75\").    Weight as of this encounter: 83.9 kg (185 lb).    Physical Exam  GENERAL: alert and no distress  EYES: Eyes grossly normal to inspection, PERRL and conjunctivae and sclerae normal  HENT: ear canals and TM's normal, nose and mouth without ulcers or lesions  NECK: no adenopathy, no asymmetry, masses, or scars  RESP: lungs clear to auscultation - no rales, rhonchi or wheezes  CV: regular rate and rhythm, normal S1 S2, no S3 or S4, no murmur, click or rub, no peripheral edema  ABDOMEN: soft, nontender, no hepatosplenomegaly, no masses and bowel sounds normal  MS: no gross musculoskeletal defects noted, no edema  SKIN: no suspicious lesions or rashes  NEURO: Normal strength and tone, mentation intact and speech normal  PSYCH: mentation appears normal, affect normal/bright         7/8/2025   Mini Cog   Clock Draw Score 2 Normal   3 Item Recall 3 objects recalled   Mini Cog Total Score 5              Signed Electronically by: Charisse Lopez MD    "

## 2025-07-09 NOTE — PATIENT INSTRUCTIONS
Patient Education   Preventive Care Advice   This is general advice given by our system to help you stay healthy. However, your care team may have specific advice just for you. Please talk to your care team about your preventive care needs.  Nutrition  Eat 5 or more servings of fruits and vegetables each day.  Try wheat bread, brown rice and whole grain pasta (instead of white bread, rice, and pasta).  Get enough calcium and vitamin D. Check the label on foods and aim for 100% of the RDA (recommended daily allowance).  Lifestyle  Exercise at least 150 minutes each week  (30 minutes a day, 5 days a week).  Do muscle strengthening activities 2 days a week. These help control your weight and prevent disease.  No smoking.  Wear sunscreen to prevent skin cancer.  Have a dental exam and cleaning every 6 months.  Yearly exams  See your health care team every year to talk about:  Any changes in your health.  Any medicines your care team has prescribed.  Preventive care, family planning, and ways to prevent chronic diseases.  Shots (vaccines)   HPV shots (up to age 26), if you've never had them before.  Hepatitis B shots (up to age 59), if you've never had them before.  COVID-19 shot: Get this shot when it's due.  Flu shot: Get a flu shot every year.  Tetanus shot: Get a tetanus shot every 10 years.  Pneumococcal, hepatitis A, and RSV shots: Ask your care team if you need these based on your risk.  Shingles shot (for age 50 and up)  General health tests  Diabetes screening:  Starting at age 35, Get screened for diabetes at least every 3 years.  If you are younger than age 35, ask your care team if you should be screened for diabetes.  Cholesterol test: At age 39, start having a cholesterol test every 5 years, or more often if advised.  Bone density scan (DEXA): At age 50, ask your care team if you should have this scan for osteoporosis (brittle bones).  Hepatitis C: Get tested at least once in your life.  STIs (sexually  transmitted infections)  Before age 24: Ask your care team if you should be screened for STIs.  After age 24: Get screened for STIs if you're at risk. You are at risk for STIs (including HIV) if:  You are sexually active with more than one person.  You don't use condoms every time.  You or a partner was diagnosed with a sexually transmitted infection.  If you are at risk for HIV, ask about PrEP medicine to prevent HIV.  Get tested for HIV at least once in your life, whether you are at risk for HIV or not.  Cancer screening tests  Cervical cancer screening: If you have a cervix, begin getting regular cervical cancer screening tests starting at age 21.  Breast cancer scan (mammogram): If you've ever had breasts, begin having regular mammograms starting at age 40. This is a scan to check for breast cancer.  Colon cancer screening: It is important to start screening for colon cancer at age 45.  Have a colonoscopy test every 10 years (or more often if you're at risk) Or, ask your provider about stool tests like a FIT test every year or Cologuard test every 3 years.  To learn more about your testing options, visit:   .  For help making a decision, visit:   https://bit.ly/mc71579.  Prostate cancer screening test: If you have a prostate, ask your care team if a prostate cancer screening test (PSA) at age 55 is right for you.  Lung cancer screening: If you are a current or former smoker ages 50 to 80, ask your care team if ongoing lung cancer screenings are right for you.  For informational purposes only. Not to replace the advice of your health care provider. Copyright   2023 [facility]. All rights reserved. Clinically reviewed by the [facility]. Clifford Thames 501493 - REV 01/24.  Learning About Activities of Daily Living  What are activities of daily living?     Activities of daily living (ADLs) are the basic self-care tasks you do every day. These include eating, bathing, dressing,  and moving around.  As you age, and if you have health problems, you may find that it's harder to do some of these tasks. If so, your doctor can suggest ideas that may help.  To measure what kind of help you may need, your doctor will ask how well you are able to do ADLs. Let your doctor know if there are any tasks that you are having trouble doing. This is an important first step to getting help. And when you have the help you need, you can stay as independent as possible.  How will a doctor assess your ADLs?  Asking about ADLs is part of a routine health checkup your doctor will likely do as you age. Your health check might be done in a doctor's office, in your home, or at a hospital. The goal is to find out if you are having any problems that could make it hard to care for yourself or that make it unsafe for you to be on your own.  To measure your ADLs, your doctor will ask how hard it is for you to do routine tasks. Your doctor may also want to know if you have changed the way you do a task because of a health problem. Your doctor may watch how you:  Walk back and forth.  Keep your balance while you stand or walk.  Move from sitting to standing or from a bed to a chair.  Button or unbutton a shirt or sweater.  Remove and put on your shoes.  It's common to feel a little worried or anxious if you find you can't do all the things you used to be able to do. Talking with your doctor about ADLs is a way to make sure you're as safe as possible and able to care for yourself as well as you can. You may want to bring a caregiver, friend, or family member to your checkup. They can help you talk to your doctor.  Follow-up care is a key part of your treatment and safety. Be sure to make and go to all appointments, and call your doctor if you are having problems. It's also a good idea to know your test results and keep a list of the medicines you take.  Current as of: October 24, 2024  Content Version: 14.5    5589-6745  [employer].   Care instructions adapted under license by your healthcare professional. If you have questions about a medical condition or this instruction, always ask your healthcare professional. [employer] disclaims any warranty or liability for your use of this information.    Hearing Loss: Care Instructions  Overview     Hearing loss is a sudden or slow decrease in how well you hear. It can range from slight to profound. Permanent hearing loss can occur with aging. It also can happen when you are exposed long-term to loud noise. Examples include listening to loud music, riding motorcycles, or being around other loud machines.  Hearing loss can affect your work and home life. It can make you feel lonely or depressed. You may feel that you have lost your independence. But hearing aids and other devices can help you hear better and feel connected to others.  Follow-up care is a key part of your treatment and safety. Be sure to make and go to all appointments, and call your doctor if you are having problems. It's also a good idea to know your test results and keep a list of the medicines you take.  How can you care for yourself at home?  Avoid loud noises whenever possible. This helps keep your hearing from getting worse.  Always wear hearing protection around loud noises.  Wear a hearing aid as directed.  A professional can help you pick a hearing aid that will work best for you.  You can also get hearing aids over the counter for mild to moderate hearing loss.  Have hearing tests as your doctor suggests. They can show whether your hearing has changed. Your hearing aid may need to be adjusted.  Use other devices as needed. These may include:  Telephone amplifiers and hearing aids that can connect to a television, stereo, radio, or microphone.  Devices that use lights or vibrations. These alert you to the doorbell, a ringing telephone, or a baby monitor.  Television closed-captioning. This  "shows the words at the bottom of the screen. Most new TVs can do this.  TTY (text telephone). This lets you type messages back and forth on the telephone instead of talking or listening. These devices are also called TDD. When messages are typed on the keyboard, they are sent over the phone line to a receiving TTY. The message is shown on a monitor.  Use text messaging, social media, and email if it is hard for you to communicate by telephone.  Try to learn a listening technique called speechreading. It is not lipreading. You pay attention to people's gestures, expressions, posture, and tone of voice. These clues can help you understand what a person is saying. Face the person you are talking to, and have them face you. Make sure the lighting is good. You need to see the other person's face clearly.  Think about counseling if you need help to adjust to your hearing loss.  When should you call for help?  Watch closely for changes in your health, and be sure to contact your doctor if:    You think your hearing is getting worse.     You have new symptoms, such as dizziness or nausea.   Where can you learn more?  Go to https://www.Travelogy.net/patiented  Enter R798 in the search box to learn more about \"Hearing Loss: Care Instructions.\"  Current as of: October 27, 2024  Content Version: 14.5    6360-1571 [employer].   Care instructions adapted under license by your healthcare professional. If you have questions about a medical condition or this instruction, always ask your healthcare professional. [employer] disclaims any warranty or liability for your use of this information.    Learning About Stress  What is stress?     Stress is your body's response to a hard situation. Your body can have a physical, emotional, or mental response. Stress is a fact of life for most people, and it affects everyone differently. What causes stress for you may not be stressful for someone else.  A lot of things " can cause stress. You may feel stress when you go on a job interview, take a test, or run a race. This kind of short-term stress is normal and even useful. It can help you if you need to work hard or react quickly. For example, stress can help you finish an important job on time.  Long-term stress is caused by ongoing stressful situations or events. Examples of long-term stress include long-term health problems, ongoing problems at work, or conflicts in your family. Long-term stress can harm your health.  How does stress affect your health?  When you are stressed, your body responds as though you are in danger. It makes hormones that speed up your heart, make you breathe faster, and give you a burst of energy. This is called the fight-or-flight stress response. If the stress is over quickly, your body goes back to normal and no harm is done.  But if stress happens too often or lasts too long, it can have bad effects. Long-term stress can make you more likely to get sick, and it can make symptoms of some diseases worse. If you tense up when you are stressed, you may develop neck, shoulder, or low back pain. Stress is linked to high blood pressure and heart disease.  Stress also harms your emotional health. It can make you rosales, tense, or depressed. Your relationships may suffer, and you may not do well at work or school.  What can you do to manage stress?  You can try these things to help manage stress:   Do something active. Exercise or activity can help reduce stress. Walking is a great way to get started. Even everyday activities such as housecleaning or yard work can help.  Try yoga or pedro chi. These techniques combine exercise and meditation. You may need some training at first to learn them.  Do something you enjoy. For example, listen to music or go to a movie. Practice your hobby or do volunteer work.  Meditate. This can help you relax, because you are not worrying about what happened before or what may  "happen in the future.  Do guided imagery. Imagine yourself in any setting that helps you feel calm. You can use online videos, books, or a teacher to guide you.  Do breathing exercises. For example:  From a standing position, bend forward from the waist with your knees slightly bent. Let your arms dangle close to the floor.  Breathe in slowly and deeply as you return to a standing position. Roll up slowly and lift your head last.  Hold your breath for just a few seconds in the standing position.  Breathe out slowly and bend forward from the waist.  Let your feelings out. Talk, laugh, cry, and express anger when you need to. Talking with supportive friends or family, a counselor, or a hugh leader about your feelings is a healthy way to relieve stress. Avoid discussing your feelings with people who make you feel worse.  Write. It may help to write about things that are bothering you. This helps you find out how much stress you feel and what is causing it. When you know this, you can find better ways to cope.  What can you do to prevent stress?  You might try some of these things to help prevent stress:  Manage your time. This helps you find time to do the things you want and need to do.  Get enough sleep. Your body recovers from the stresses of the day while you are sleeping.  Get support. Your family, friends, and community can make a difference in how you experience stress.  Limit your news feed. Avoid or limit time on social media or news that may make you feel stressed.  Do something active. Exercise or activity can help reduce stress. Walking is a great way to get started.  Where can you learn more?  Go to https://www.Attentive.ly.net/patiented  Enter N032 in the search box to learn more about \"Learning About Stress.\"  Current as of: October 24, 2024  Content Version: 14.5 2024-2025 [facility] [employer].   Care instructions adapted under license by your healthcare professional. If you have questions about a " "medical condition or this instruction, always ask your healthcare professional. [employer] disclaims any warranty or liability for your use of this information.    9 Ways to Cut Back on Drinking  Maybe you've found yourself drinking more alcohol than you'd prefer. If you want to cut back, here are some ideas to try.    Think before you drink.  Do you really want a drink, or is it just a habit? If you're used to having a drink at a certain time, try doing something else then.     Look for substitutes.  Find some no-alcohol drinks that you enjoy, like flavored seltzer water, tea with honey, or tonic with a slice of lime. Or try alcohol-free beer or \"virgin\" cocktails (without the alcohol).     Drink more water.  Use water to quench your thirst. Drink a glass of water before you have any alcohol. Have another glass along with every drink or between drinks.     Shrink your drink.  For example, have a bottle of beer instead of a pint. Use a smaller glass for wine. Choose drinks with lower alcohol content (ABV%). Or use less liquor and more mixer in cocktails.     Slow down.  It's easy to drink quickly and without thinking about it. Pay attention, and make each drink last longer.     Do the math.  Total up how much you spend on alcohol each month. How much is that a year? If you cut back, what could you do with the money you save?     Take a break.  Choose a day or two each week when you won't drink at all. Notice how you feel on those days, physically and emotionally. How did you sleep? Do you feel better? Over time, add more break days.     Count calories.  Would you like to lose some weight? For some people that's a good motivator for cutting back. Figure out how many calories are in each drink. How many does that add up to in a day? In a week? In a month?     Practice saying no.  Be ready when someone offers you a drink. Try: \"Thanks, I've had enough.\" Or \"Thanks, but I'm cutting back.\" Or \"No, thanks. I " "feel better when I drink less.\"   Current as of: August 20, 2024  Content Version: 14.5 2024-2025 [employer].   Care instructions adapted under license by your healthcare professional. If you have questions about a medical condition or this instruction, always ask your healthcare professional. [employer] disclaims any warranty or liability for your use of this information.     "

## 2025-07-15 ENCOUNTER — TELEPHONE (OUTPATIENT)
Dept: GASTROENTEROLOGY | Facility: CLINIC | Age: 75
End: 2025-07-15
Payer: COMMERCIAL

## 2025-07-15 ENCOUNTER — VIRTUAL VISIT (OUTPATIENT)
Dept: OTHER | Facility: CLINIC | Age: 75
End: 2025-07-15
Attending: INTERNAL MEDICINE
Payer: COMMERCIAL

## 2025-07-15 DIAGNOSIS — I65.23 BILATERAL CAROTID ARTERY STENOSIS: ICD-10-CM

## 2025-07-15 DIAGNOSIS — I10 ESSENTIAL HYPERTENSION: ICD-10-CM

## 2025-07-15 DIAGNOSIS — E78.5 HYPERLIPIDEMIA LDL GOAL <70: ICD-10-CM

## 2025-07-15 DIAGNOSIS — E83.52 HYPERCALCEMIA: Primary | ICD-10-CM

## 2025-07-15 DIAGNOSIS — I70.1 RENAL ARTERY STENOSIS: ICD-10-CM

## 2025-07-15 DIAGNOSIS — I10 BENIGN ESSENTIAL HYPERTENSION: ICD-10-CM

## 2025-07-15 DIAGNOSIS — I71.40 ABDOMINAL AORTIC ANEURYSM (AAA) 3.0 CM TO 5.5 CM IN DIAMETER IN MALE: ICD-10-CM

## 2025-07-15 DIAGNOSIS — Z13.1 SCREENING FOR DIABETES MELLITUS: ICD-10-CM

## 2025-07-15 PROCEDURE — 98006 SYNCH AUDIO-VIDEO EST MOD 30: CPT | Performed by: INTERNAL MEDICINE

## 2025-07-15 PROCEDURE — 3044F HG A1C LEVEL LT 7.0%: CPT | Performed by: INTERNAL MEDICINE

## 2025-07-15 RX ORDER — HYDRALAZINE HYDROCHLORIDE 100 MG/1
100 TABLET, FILM COATED ORAL 2 TIMES DAILY
Qty: 90 TABLET | Refills: 3 | Status: SHIPPED | OUTPATIENT
Start: 2025-07-15

## 2025-07-15 RX ORDER — EZETIMIBE 10 MG/1
10 TABLET ORAL DAILY
Qty: 90 TABLET | Refills: 3 | Status: SHIPPED | OUTPATIENT
Start: 2025-07-15

## 2025-07-15 NOTE — TELEPHONE ENCOUNTER
Endoscopy Scheduling Screen      What insurance is in the chart?  Other:  [facility]    Ordering/Referring Provider:  Charisse Lopez MD    (If ordering provider performs procedure, schedule with ordering provider unless otherwise instructed. )    BMI: 28.34    Sedation Ordered  general anesthesia.   BMI<= 45 45 < BMI <= 48 48 < BMI < = 50  BMI > 50   No Restrictions No [facility]  No [facility]  [facility] with exceptions Hospital Only OR Only       Do you have a history of malignant hyperthermia?  NO    (Females) Are you currently pregnant?   NO     Are you currently on dialysis?   NO    Do you need assistance transferring?   NO    BMI: There is no height or weight on file to calculate BMI.     Is patients BMI > 50?  NO    BMI > 40?  NO    Do you have a diagnosis of diabetes?  NO    Do you take an Oral or Injectable medication for weight loss or diabetes (excluding insulin)?  NO    Do you take the medication Naltrexone?  NO    Do you take blood thinners?  NO    Prep   Are you currently have chronic kidney disease?  NO    Do you have a diagnosis of cystic fibrosis (CF)?  NO    On a regular basis do you go 3 -5 days between each bowel movements?  NO    Preferred Pharmacy:    [id number] [place] [id number]  Phone: [phone] Fax: [phone]      Final Scheduling Details     Procedure scheduled  Upper endoscopy (EGD)    Surgeon:  Barby      Date of procedure:  7/17/25     [facility] - Per order.    What is your communication preference for Instructions and/or Bowel Prep?   MyChart    Patient Reminders:    You will receive a call from a Nurse to review instructions and health history.  This assessment must be completed prior to your procedure.  Failure to complete the Nurse assessment may result in the procedure being cancelled.       On the day of your procedure, please designate an adult(s) who can drive you home stay with you for the next 24 hours.  The medicines used in the exam will make you sleepy. You will not be able to drive.       You cannot take public transportation, ride share services, or non-medical taxi service without a responsible caregiver.  Medical transport services are allowed with the requirement that a responsible caregiver will receive you at your destination.  We require that drivers and caregivers are confirmed prior to your procedure.

## 2025-07-15 NOTE — PROGRESS NOTES
VASCULAR MEDICAL ASSESSMENT  REFERRAL SOURCE: Dr. Charisse Lopez for renal artery stenosis.   REASON FOR CONSULT: for renal artery stenosis, h/o AAA, Lt CEA, former tobacco use       A/P:                 (I70.1) Renal artery stenosis     Comment: His renal resistive indices imply he would likely respond to renal artery stenting on the right, but less likely on the left. There are 2 left renal arteries. The main left renal artery demonstrates severe, greater than 95%, near occlusive stenosis proximally near the ostium on CTA. There is associated delayed nephrogram involving the mid and upper pole the left kidney.. We do not know if he even needs this however as we have no real world BP data on the patient. His BP was quite high here previously but he had had a stressful drive in . He has no headaches, visual changes, nausea, confusion. He is told that if he develops those sxs with an SBP > 180 he needs to go to the ED. He states his SBP at home is 150. He went to the ED the next day and was prescribed chlorthalidone. At home his BP runs [id number]/ on amlodipine 5 mg,  metoprolol 25 BID, chlorthalidone 25 mg daily. His HR is 55. K is low while on chlorthalidone but he ran out of that. . Calcium and Cr increased on chlorthalidone. Goal BP < 120/70 if possible due to AAA. See that below. At present  risk  of chlorthalidone exceed benefits despite the above. BP on hydralazine 50 TID was 135/85     Plan: Continue  hydralazine but increase from 25 to 50 mg PO TID. Stay off of chlorthalidone due to hypercalcemia, increased Cr, increased ionized calcium. Exhaust pharmacologic options prior to proceeding to stenting or denervation. Continue K supplementation. Repeat  hypercalcemia w/u off of chlorthalidone.     (I71.40) Abdominal aortic aneurysm (AAA) 3.0 cm to 5.5 cm in diameter in male  Comment: This is growing. On CTA this is measuring 4.7 x 4.3 cm maximally. Previously, on 6/5/2019, the abdominal aortic aneurysm measured  3.7 x 3.3 cm. The aneurysm also demonstrates a moderate amount of mural thrombus which is increased from the prior study.  Plan: CTA Abdomen Pelvis with Contrast in 11/2025 to document rapid growth or lack thereof.        Check the above, RTC one week later. May need prehydration.         (I65.23) Bilateral carotid artery stenosis  (primary encounter diagnosis)  Comment: surveillance monitoring though Dr. Porter.   Plan: Carotid duplex in 12/2025.        (Z72.0) Tobacco abuse  Plan: He has stopped.                   (E78.1) Hypertriglyceridemia, (E78.5) Hyperlipidemia LDL goal <70  Comment: Advanced lipid testing of 7/1/25 revealed LDL-C of 72, LDL-P of 1447, cardiac CRP of 2.08, and Lp(a) of 60 while on Lipitor 40.    Plan: As a pt with ISABELLA and carotid stenosis, he should be treated to an optimal LDL-C < 55. I would recommend adding Zetia 210 to Lipitor 40 and repeating labs in three months, RTC two weeks later.              (M10.00) Idiopathic gout, unspecified chronicity, unspecified site  Comment: This limits our ability to use Nexletol in the future for hyperlipidemia treatment if needed.            (I10) Essential hypertension  Comment: See above regarding NASIR.  Plan: As above.        (R73.01) IFG (impaired fasting glucose)  Comment: A1C 5.6  Plan: Avoid CHO.      (E83.52) Hypercalcemia  (primary encounter diagnosis)  Comment: ionized calcium is elevated. He is off of thiazide diuretics. His PTH and PTHrP are normal.   Plan: He is advised to f/u with his PCP regarding this non vascular matter.                      The longitudinal care of plan for Bruno was addressed during this visit. Due to added complexity of care, we will continue to support Bruno Baig  and the subsequent management of these conditions and with ongoing continuity of care for these conditions.               32 minutes total medical care on today's date.     MD location: office  Pt location: home     Video call start: 15:48  Video   call end: 16:20        Doximity        HPI: Bruno Baig is a 74 year old male with a h/o gout, htn, HLD, IFG, hyperTGemia, and stage 3 CKD. The patient presents today to address htn with recently discovered NASIR on renal duplex. He recently stopped smoking, His Rt RAR index is < 0.8. His left RAR index is up to 0.9. His BP is markedly elevated here today. He had a stressful drive through [facility] during rush hour when seen on 4/23/25. He is currently on a beta blocker and calcium blocker and is bradycardic in the mid to high 50s. He is tolerating this without difficulty. Lisinopril, losartan and hydrochlorothiazide stopped in past due to NOEL/side effects. On chlorthalidone has developed worsening of stage 3 CKD . One year ago Cr was 1.2. It is most recently 1.73.               Review Of Systems  Skin: negative  Eyes: negative  Ears/Nose/Throat: negative  Respiratory: No shortness of breath, dyspnea on exertion, cough, or hemoptysis  Cardiovascular: negative  Gastrointestinal: negative  Genitourinary: negative  Musculoskeletal: negative  Neurologic: negative  Psychiatric: negative  Hematologic/Lymphatic/Immunologic: negative  Endocrine: negative      PAST MEDICAL HISTORY:                  Past Medical History:   Diagnosis Date    Hypertension        PAST SURGICAL HISTORY:                  Past Surgical History:   Procedure Laterality Date    COLONOSCOPY N/A 11/09/2017    Procedure: COLONOSCOPY;  colonoscopy;  Surgeon: Justice Horner MD;  Location: [facility]    ENDARTERECTOMY CAROTID Left 07/13/2023    Procedure: LEFT CAROTID ENDARTERECTOMY with pericardial patch angioplasty and NASAL INTUBATION AND ELECTROENCEPHALOGRAM;  Surgeon: Enzo Amanda MD;  Location:  OR    EYE SURGERY         CURRENT MEDICATIONS:                  Current Outpatient Medications   Medication Sig Dispense Refill    allopurinol (ZYLOPRIM) 100 MG tablet Take 0.5 tablets (50 mg) by mouth daily. 45 tablet 3    amLODIPine  (NORVASC) 5 MG tablet Take 1 tablet (5 mg) by mouth daily. (Patient taking differently: Take 10 mg by mouth daily.) 90 tablet 3    aspirin 81 MG EC tablet Take 1 tablet (81 mg) by mouth daily 60 tablet 1    atorvastatin (LIPITOR) 40 MG tablet Take 2 tablets (80 mg) by mouth daily. 180 tablet 3    Cholecalciferol (VITAMIN D3) 2000 units CAPS Take 1 capsule by mouth every morning       Cyanocobalamin (B-12) 1000 MCG CAPS Take 1 capsule by mouth daily      hydrALAZINE (APRESOLINE) 50 MG tablet Take 1 tablet (50 mg) by mouth 3 times daily. 270 tablet 3    metoprolol tartrate (LOPRESSOR) 25 MG tablet Take 1 tablet (25 mg) by mouth 2 times daily. 180 tablet 3    omega-3 acid ethyl esters (LOVAZA) 1 g capsule Take 2 capsules by mouth 2 times daily. 360 capsule 3    potassium chloride ER (K-TAB/KLOR-CON) 10 MEQ CR tablet Take 1 tablet (10 mEq) by mouth 2 times daily. 90 tablet 3       ALLERGIES:                No Known Allergies    SOCIAL HISTORY:                  Social History     Socioeconomic History    Marital status:      Spouse name: Not on file    Number of children: Not on file    Years of education: Not on file    Highest education level: Not on file   Occupational History    Not on file   Tobacco Use    Smoking status: Former     Current packs/day: 0.00     Average packs/day: 0.5 packs/day for 30.0 years (15.0 ttl pk-yrs)     Types: Cigarettes     Start date: 1993     Quit date: 2023     Years since quittin.0    Smokeless tobacco: Never    Tobacco comments:     Quit    Vaping Use    Vaping status: Never Used   Substance and Sexual Activity    Alcohol use: Yes     Comment: 1-2 daily    Drug use: No    Sexual activity: Yes     Partners: Female   Other Topics Concern    Parent/sibling w/ CABG, MI or angioplasty before 65F 55M? No   Social History Narrative    Not on file     Social Drivers of Health     Financial Resource Strain: Low Risk  (2025)    Financial Resource Strain      Within the past 12 months, have you or your family members you live with been unable to get utilities (heat, electricity) when it was really needed?: No   Food Insecurity: Low Risk  (7/8/2025)    Food Insecurity     Within the past 12 months, did you worry that your food would run out before you got money to buy more?: No     Within the past 12 months, did the food you bought just not last and you didn t have money to get more?: No   Transportation Needs: Low Risk  (7/8/2025)    Transportation Needs     Within the past 12 months, has lack of transportation kept you from medical appointments, getting your medicines, non-medical meetings or appointments, work, or from getting things that you need?: No   Physical Activity: Unknown (7/8/2025)    Exercise Vital Sign     Days of Exercise per Week: 1 day     Minutes of Exercise per Session: Not on file   Stress: Stress Concern Present (7/8/2025)    [employer] [place] [employer] - Occupational Stress Questionnaire     Feeling of Stress : To some extent   Social Connections: Unknown (7/8/2025)    Social Connection and Isolation Panel [NHANES]     Frequency of Communication with Friends and Family: Not on file     Frequency of Social Gatherings with Friends and Family: Twice a week     Attends Anabaptist Services: Not on file     Active Member of Clubs or Organizations: Not on file     Attends Club or Organization Meetings: Not on file     Marital Status: Not on file   Interpersonal Safety: Low Risk  (5/6/2025)    Interpersonal Safety     Do you feel physically and emotionally safe where you currently live?: Yes     Within the past 12 months, have you been hit, slapped, kicked or otherwise physically hurt by someone?: No     Within the past 12 months, have you been humiliated or emotionally abused in other ways by your partner or ex-partner?: No   Housing Stability: Low Risk  (7/8/2025)    Housing Stability     Do you have housing? : Yes     Are you worried about  losing your housing?: No       FAMILY HISTORY:                   Family History   Problem Relation Age of Onset    Other Cancer Mother         lung    Heart Failure Father     Lung Cancer Sister                  Physical exam was not undertaken as this was a billable video visit.              All relevant labs and imaging reviewed by myself on today's date.

## 2025-07-16 ENCOUNTER — ANESTHESIA EVENT (OUTPATIENT)
Dept: GASTROENTEROLOGY | Facility: CLINIC | Age: 75
End: 2025-07-16
Payer: COMMERCIAL

## 2025-07-16 NOTE — ANESTHESIA PREPROCEDURE EVALUATION
Anesthesia Pre-Procedure Evaluation    Patient: Bruno Baig   MRN: [id number] : 1950          Procedure : Procedure(s):  Esophagoscopy, gastroscopy, duodenoscopy (EGD), combined         Past Medical History:   Diagnosis Date    Hypertension       Past Surgical History:   Procedure Laterality Date    COLONOSCOPY N/A 2017    Procedure: COLONOSCOPY;  colonoscopy;  Surgeon: Justice Horner MD;  Location: [facility]    ENDARTERECTOMY CAROTID Left 2023    Procedure: LEFT CAROTID ENDARTERECTOMY with pericardial patch angioplasty and NASAL INTUBATION AND ELECTROENCEPHALOGRAM;  Surgeon: Enzo Amanda MD;  Location: [facility]    EYE SURGERY        No Known Allergies   Social History     Tobacco Use    Smoking status: Former     Current packs/day: 0.00     Average packs/day: 0.5 packs/day for 30.0 years (15.0 ttl pk-yrs)     Types: Cigarettes     Start date: 1993     Quit date: 2023     Years since quittin.0    Smokeless tobacco: Never    Tobacco comments:     Quit    Substance Use Topics    Alcohol use: Yes     Comment: 1-2 daily      Wt Readings from Last 1 Encounters:   25 83.9 kg (185 lb)        Anesthesia Evaluation   Pt has had prior anesthetic. Type: MAC and General.        ROS/MED HX  ENT/Pulmonary:       Neurologic:       Cardiovascular:     (+)  hypertension- -   -  - -                                      METS/Exercise Tolerance:     Hematologic:       Musculoskeletal:       GI/Hepatic:       Renal/Genitourinary:     (+) renal disease, type: CRI,            Endo:       Psychiatric/Substance Use:       Infectious Disease:       Malignancy:       Other:              Physical Exam  Airway  Cardiovascular - normal exam   Dental   (+) Multiple crowns, permanent bridges      Pulmonary - normal exam      Neurological - normal exam  He appears awake, alert and oriented x3.    Other Findings       OUTSIDE LABS:  CBC:   Lab Results   Component Value Date    WBC 7.3 2025     WBC 14.0 (H) 02/18/2025    HGB 13.8 04/24/2025    HGB 14.1 02/18/2025    HCT 40.1 04/24/2025    HCT 41.0 02/18/2025     04/24/2025     02/18/2025     BMP:   Lab Results   Component Value Date     07/01/2025     06/09/2025    POTASSIUM 4.3 07/01/2025    POTASSIUM 4.9 06/09/2025    CHLORIDE 104 07/01/2025    CHLORIDE 102 06/09/2025    CO2 21 (L) 07/01/2025    CO2 23 06/09/2025    BUN 23.4 (H) 07/01/2025    BUN 27.3 (H) 06/09/2025    CR 1.82 (H) 07/01/2025    CR 1.74 (H) 06/09/2025     (H) 07/01/2025     (H) 06/09/2025     COAGS:   Lab Results   Component Value Date    PTT 29 07/12/2023    INR 1.04 07/12/2023     POC:   Lab Results   Component Value Date     (H) 11/09/2017     HEPATIC:   Lab Results   Component Value Date    ALBUMIN 3.8 07/01/2025    PROTTOTAL 7.1 07/01/2025    ALT 35 07/01/2025    AST 38 07/01/2025    ALKPHOS 71 07/01/2025    BILITOTAL 0.7 07/01/2025     OTHER:   Lab Results   Component Value Date    LACT 0.7 06/05/2019    A1C 5.6 07/08/2025    ROLDAN 10.6 (H) 07/01/2025    TSH 2.47 07/01/2019       Anesthesia Plan    ASA Status:  3      NPO Status: NPO Appropriate   Anesthesia Type: General.  Maintenance: Balanced.   Techniques and Equipment:       - Monitoring Plan: standard ASA monitoring     Consents    Anesthesia Plan(s) and associated risks, benefits, and realistic alternatives discussed. Questions answered and patient/representative(s) expressed understanding.     - Discussed: CRNA     - Discussed with:                Postoperative Care         Comments:                   ILENE Chopra CRNA    I have reviewed the pertinent notes and labs in the chart from the past 30 days and (re)examined the patient.  Any updates or changes from those notes are reflected in this note.    Clinically Significant Risk Factors Present on Admission                   # Hypertension: Noted on problem list           # Overweight: Estimated body mass index is  "28.34 kg/m  as calculated from the following:    Height as of 7/8/25: 1.721 m (5' 7.75\").    Weight as of 7/8/25: 83.9 kg (185 lb).                    "

## 2025-07-17 ENCOUNTER — HOSPITAL ENCOUNTER (OUTPATIENT)
Facility: CLINIC | Age: 75
Discharge: HOME | End: 2025-07-17
Attending: STUDENT IN AN ORGANIZED HEALTH CARE EDUCATION/TRAINING PROGRAM | Admitting: STUDENT IN AN ORGANIZED HEALTH CARE EDUCATION/TRAINING PROGRAM
Payer: COMMERCIAL

## 2025-07-17 ENCOUNTER — ANESTHESIA (OUTPATIENT)
Dept: GASTROENTEROLOGY | Facility: CLINIC | Age: 75
End: 2025-07-17
Payer: COMMERCIAL

## 2025-07-17 VITALS
RESPIRATION RATE: 15 BRPM | SYSTOLIC BLOOD PRESSURE: 145 MMHG | DIASTOLIC BLOOD PRESSURE: 62 MMHG | HEART RATE: 61 BPM | OXYGEN SATURATION: 92 %

## 2025-07-17 LAB — UPPER GI ENDOSCOPY: NORMAL

## 2025-07-17 PROCEDURE — 370N000017 HC ANESTHESIA TECHNICAL FEE, PER MIN: Performed by: STUDENT IN AN ORGANIZED HEALTH CARE EDUCATION/TRAINING PROGRAM

## 2025-07-17 PROCEDURE — 88342 IMHCHEM/IMCYTCHM 1ST ANTB: CPT | Mod: TC | Performed by: STUDENT IN AN ORGANIZED HEALTH CARE EDUCATION/TRAINING PROGRAM

## 2025-07-17 PROCEDURE — 258N000003 HC RX IP 258 OP 636: Performed by: STUDENT IN AN ORGANIZED HEALTH CARE EDUCATION/TRAINING PROGRAM

## 2025-07-17 PROCEDURE — 43239 EGD BIOPSY SINGLE/MULTIPLE: CPT | Performed by: STUDENT IN AN ORGANIZED HEALTH CARE EDUCATION/TRAINING PROGRAM

## 2025-07-17 PROCEDURE — 250N000011 HC RX IP 250 OP 636: Performed by: NURSE ANESTHETIST, CERTIFIED REGISTERED

## 2025-07-17 PROCEDURE — 250N000009 HC RX 250: Performed by: STUDENT IN AN ORGANIZED HEALTH CARE EDUCATION/TRAINING PROGRAM

## 2025-07-17 PROCEDURE — 250N000009 HC RX 250: Performed by: NURSE ANESTHETIST, CERTIFIED REGISTERED

## 2025-07-17 RX ORDER — SODIUM CHLORIDE, SODIUM LACTATE, POTASSIUM CHLORIDE, CALCIUM CHLORIDE 600; 310; 30; 20 MG/100ML; MG/100ML; MG/100ML; MG/100ML
INJECTION, SOLUTION INTRAVENOUS CONTINUOUS
Status: DISCONTINUED | OUTPATIENT
Start: 2025-07-17 | End: 2025-07-17 | Stop reason: HOSPADM

## 2025-07-17 RX ORDER — LIDOCAINE 40 MG/G
CREAM TOPICAL
Status: DISCONTINUED | OUTPATIENT
Start: 2025-07-17 | End: 2025-07-17 | Stop reason: HOSPADM

## 2025-07-17 RX ORDER — LIDOCAINE HYDROCHLORIDE 20 MG/ML
INJECTION, SOLUTION INFILTRATION; PERINEURAL PRN
Status: DISCONTINUED | OUTPATIENT
Start: 2025-07-17 | End: 2025-07-17

## 2025-07-17 RX ORDER — PROPOFOL 10 MG/ML
INJECTION, EMULSION INTRAVENOUS CONTINUOUS PRN
Status: DISCONTINUED | OUTPATIENT
Start: 2025-07-17 | End: 2025-07-17

## 2025-07-17 RX ORDER — GLYCOPYRROLATE 0.2 MG/ML
INJECTION, SOLUTION INTRAMUSCULAR; INTRAVENOUS PRN
Status: DISCONTINUED | OUTPATIENT
Start: 2025-07-17 | End: 2025-07-17

## 2025-07-17 RX ADMIN — SODIUM CHLORIDE, POTASSIUM CHLORIDE, SODIUM LACTATE AND CALCIUM CHLORIDE: 600; 310; 30; 20 INJECTION, SOLUTION INTRAVENOUS at 09:26

## 2025-07-17 RX ADMIN — LIDOCAINE HYDROCHLORIDE 0.1 ML: 10 INJECTION, SOLUTION EPIDURAL; INFILTRATION; INTRACAUDAL; PERINEURAL at 09:26

## 2025-07-17 RX ADMIN — LIDOCAINE HYDROCHLORIDE 100 MG: 20 INJECTION, SOLUTION INFILTRATION; PERINEURAL at 10:50

## 2025-07-17 RX ADMIN — PROPOFOL 200 MCG/KG/MIN: 10 INJECTION, EMULSION INTRAVENOUS at 10:47

## 2025-07-17 RX ADMIN — GLYCOPYRROLATE 0.2 MG: 0.2 INJECTION, SOLUTION INTRAMUSCULAR; INTRAVENOUS at 10:50

## 2025-07-17 RX ADMIN — TOPICAL ANESTHETIC 2 SPRAY: 200 SPRAY DENTAL; PERIODONTAL at 10:50

## 2025-07-17 ASSESSMENT — ACTIVITIES OF DAILY LIVING (ADL)
ADLS_ACUITY_SCORE: 44

## 2025-07-17 NOTE — INTERVAL H&P NOTE
"I have reviewed the surgical (or preoperative) H&P that is linked to this encounter, and examined the patient. There are no significant changes    Clinical Conditions Present on Arrival:  Clinically Significant Risk Factors Present on Admission           # Hypercalcemia: Highest Ca = 10.6 mg/dL in last 30 days, will monitor as appropriate        # Drug Induced Platelet Defect: home medication list includes an antiplatelet medication      # Overweight: Estimated body mass index is 28.98 kg/m  (pended) as calculated from the following:    Height as of this encounter: (P) 1.702 m (5' 7\").    Weight as of this encounter: (P) 83.9 kg (185 lb).       "

## 2025-07-17 NOTE — ANESTHESIA POSTPROCEDURE EVALUATION
Patient: Bruno Baig    Procedure: Procedure(s):  Esophagoscopy, gastroscopy, duodenoscopy (EGD), combined       Anesthesia Type:  General    Note:  Disposition: Outpatient   Postop Pain Control: Uneventful            Sign Out: Well controlled pain   PONV: No   Neuro/Psych: Uneventful            Sign Out: Acceptable/Baseline neuro status   Airway/Respiratory: Uneventful            Sign Out: Acceptable/Baseline resp. status   CV/Hemodynamics: Uneventful            Sign Out: Acceptable CV status; No obvious hypovolemia; No obvious fluid overload   Other NRE: NONE   DID A NON-ROUTINE EVENT OCCUR? No           Last vitals:  Vitals:    07/17/25 0854   BP: (!) (P) 172/75   Pulse: (P) 55   Resp: (P) 16   Temp: (P) 36.6  C (97.8  F)   SpO2: (P) 98%       Electronically Signed By: ILENE Garcia CRNA  July 17, 2025  10:57 AM

## 2025-07-17 NOTE — ANESTHESIA CARE TRANSFER NOTE
Patient: Bruno Baig    Procedure: Procedure(s):  Esophagoscopy, gastroscopy, duodenoscopy (EGD), combined       Diagnosis: Esophageal dysphagia [R13.19]  Diagnosis Additional Information: No value filed.    Anesthesia Type:   General     Note:    Oropharynx: oropharynx clear of all foreign objects and spontaneously breathing  Level of Consciousness: awake and drowsy  Oxygen Supplementation: room air    Independent Airway: airway patency satisfactory and stable  Dentition: dentition unchanged  Vital Signs Stable: post-procedure vital signs reviewed and stable  Report to RN Given: handoff report given  Patient transferred to: Phase II    Handoff Report: Identifed the Patient, Identified the Reponsible Provider, Reviewed the pertinent medical history, Discussed the surgical course, Reviewed Intra-OP anesthesia mangement and issues during anesthesia, Set expectations for post-procedure period and Allowed opportunity for questions and acknowledgement of understanding      Vitals:  Vitals Value Taken Time   BP     Temp     Pulse     Resp     SpO2         Electronically Signed By: ILENE Garcia CRNA  July 17, 2025  10:57 AM

## 2025-07-21 LAB
PATH REPORT.COMMENTS IMP SPEC: NORMAL
PATH REPORT.FINAL DX SPEC: NORMAL
PATH REPORT.GROSS SPEC: NORMAL
PATH REPORT.MICROSCOPIC SPEC OTHER STN: NORMAL
PATH REPORT.RELEVANT HX SPEC: NORMAL
PHOTO IMAGE: NORMAL

## 2025-07-21 PROCEDURE — 88342 IMHCHEM/IMCYTCHM 1ST ANTB: CPT | Mod: 26

## 2025-07-21 PROCEDURE — 88305 TISSUE EXAM BY PATHOLOGIST: CPT | Mod: 26

## 2025-07-22 ENCOUNTER — RESULTS FOLLOW-UP (OUTPATIENT)
Dept: SURGERY | Facility: CLINIC | Age: 75
End: 2025-07-22
Payer: COMMERCIAL

## 2025-08-01 ENCOUNTER — HOSPITAL ENCOUNTER (OUTPATIENT)
Dept: CT IMAGING | Facility: CLINIC | Age: 75
Discharge: HOME | End: 2025-08-01
Attending: FAMILY MEDICINE | Admitting: FAMILY MEDICINE
Payer: COMMERCIAL

## 2025-08-01 DIAGNOSIS — Z87.891 HISTORY OF TOBACCO USE, PRESENTING HAZARDS TO HEALTH: ICD-10-CM

## 2025-08-01 PROCEDURE — 71271 CT THORAX LUNG CANCER SCR C-: CPT

## 2025-08-13 ENCOUNTER — NURSE TRIAGE (OUTPATIENT)
Dept: FAMILY MEDICINE | Facility: CLINIC | Age: 75
End: 2025-08-13
Payer: COMMERCIAL

## 2025-08-14 ENCOUNTER — OFFICE VISIT (OUTPATIENT)
Dept: FAMILY MEDICINE | Facility: CLINIC | Age: 75
End: 2025-08-14
Payer: COMMERCIAL

## 2025-08-14 VITALS
HEART RATE: 99 BPM | WEIGHT: 190 LBS | BODY MASS INDEX: 28.79 KG/M2 | DIASTOLIC BLOOD PRESSURE: 60 MMHG | SYSTOLIC BLOOD PRESSURE: 122 MMHG | HEIGHT: 68 IN | OXYGEN SATURATION: 98 % | RESPIRATION RATE: 20 BRPM | TEMPERATURE: 97.3 F

## 2025-08-14 DIAGNOSIS — M79.89 LEG SWELLING: Primary | ICD-10-CM

## 2025-08-14 DIAGNOSIS — R06.02 SOB (SHORTNESS OF BREATH): ICD-10-CM

## 2025-08-14 LAB
ANION GAP SERPL CALCULATED.3IONS-SCNC: 13 MMOL/L (ref 7–15)
BUN SERPL-MCNC: 25.9 MG/DL (ref 8–23)
CALCIUM SERPL-MCNC: 10.3 MG/DL (ref 8.8–10.4)
CHLORIDE SERPL-SCNC: 104 MMOL/L (ref 98–107)
CREAT SERPL-MCNC: 1.86 MG/DL (ref 0.67–1.17)
EGFRCR SERPLBLD CKD-EPI 2021: 37 ML/MIN/1.73M2
GLUCOSE SERPL-MCNC: 96 MG/DL (ref 70–99)
HCO3 SERPL-SCNC: 21 MMOL/L (ref 22–29)
NT-PROBNP SERPL-MCNC: 1307 PG/ML (ref 0–852)
POTASSIUM SERPL-SCNC: 4.5 MMOL/L (ref 3.4–5.3)
SODIUM SERPL-SCNC: 138 MMOL/L (ref 135–145)

## 2025-08-14 PROCEDURE — 93000 ELECTROCARDIOGRAM COMPLETE: CPT | Performed by: NURSE PRACTITIONER

## 2025-08-14 PROCEDURE — 3074F SYST BP LT 130 MM HG: CPT | Performed by: NURSE PRACTITIONER

## 2025-08-14 PROCEDURE — 99214 OFFICE O/P EST MOD 30 MIN: CPT | Performed by: NURSE PRACTITIONER

## 2025-08-14 PROCEDURE — 3078F DIAST BP <80 MM HG: CPT | Performed by: NURSE PRACTITIONER

## 2025-08-14 PROCEDURE — 80048 BASIC METABOLIC PNL TOTAL CA: CPT | Performed by: NURSE PRACTITIONER

## 2025-08-14 PROCEDURE — 83880 ASSAY OF NATRIURETIC PEPTIDE: CPT | Performed by: NURSE PRACTITIONER

## 2025-08-14 PROCEDURE — 1126F AMNT PAIN NOTED NONE PRSNT: CPT | Performed by: NURSE PRACTITIONER

## 2025-08-14 PROCEDURE — 36415 COLL VENOUS BLD VENIPUNCTURE: CPT | Performed by: NURSE PRACTITIONER

## 2025-08-14 RX ORDER — FUROSEMIDE 20 MG/1
20 TABLET ORAL DAILY
Qty: 14 TABLET | Refills: 0 | Status: SHIPPED | OUTPATIENT
Start: 2025-08-14

## 2025-08-14 ASSESSMENT — PAIN SCALES - GENERAL: PAINLEVEL_OUTOF10: NO PAIN (0)

## 2025-08-18 ENCOUNTER — PATIENT OUTREACH (OUTPATIENT)
Dept: CARE COORDINATION | Facility: CLINIC | Age: 75
End: 2025-08-18
Payer: COMMERCIAL

## (undated) DEVICE — SU VICRYL 2-0 SH 27" J317H

## (undated) DEVICE — PREP CHLORAPREP W/ORANGE TINT 10.5ML 930715

## (undated) DEVICE — SU SILK 4-0 TIE 24" SA73H

## (undated) DEVICE — GLOVE BIOGEL PI ORTHOPRO SZ 8.0 47680

## (undated) DEVICE — SU PROLENE 7-0 BV-1DA 4X30" M8703

## (undated) DEVICE — PACK UNIVERSAL SPLIT 29131

## (undated) DEVICE — NDL 19GA 1.5"

## (undated) DEVICE — CLIP ETHICON LIGACLIP SM BLUE LT100

## (undated) DEVICE — SURGICEL HEMOSTAT 2X14" 1951

## (undated) DEVICE — SPONGE RAY-TEC 4X8" 7318

## (undated) DEVICE — BLADE KNIFE BEAVER MINI BEAVER6400

## (undated) DEVICE — IONM UP TO 7 HOURS

## (undated) DEVICE — SU SILK 2-0 TIE 24" SA75H

## (undated) DEVICE — IONM SUPPLIES

## (undated) DEVICE — DRAPE IOBAN INCISE 23X17" 6650EZ

## (undated) DEVICE — SOL WATER IRRIG 1000ML BOTTLE 2F7114

## (undated) DEVICE — SU VICRYL 3-0 CT-1 36" J338H

## (undated) DEVICE — PACK VASCULAR SCV15VAFSB

## (undated) DEVICE — SU SILK 3-0 SH 30" K832H

## (undated) DEVICE — SU SILK 3-0 TIE 24" SA74H

## (undated) DEVICE — TUBING SUCTION 12"X1/4" N612

## (undated) DEVICE — ENDO FORCEP ENDOJAW BIOPSY 2.8MMX230CM FB-220U

## (undated) DEVICE — NDL BLUNT 19GA 1.5"

## (undated) DEVICE — DRSG STERI STRIP 1/2X4" R1547

## (undated) DEVICE — SOL NACL 0.9% IRRIG 1000ML BOTTLE 2F7124

## (undated) DEVICE — LINEN TOWEL PACK X5 5464

## (undated) DEVICE — SU MONOCRYL 4-0 PS-2 18" UND Y496G

## (undated) DEVICE — SU PROLENE 7-0 BV-1DA 24" 8702H

## (undated) DEVICE — SYR 01ML 27GA 0.5" NDL TBC 309623

## (undated) DEVICE — SU PROLENE 6-0 C-1DA 30" 8706H

## (undated) DEVICE — DECANTER BAG 2002S

## (undated) DEVICE — GOWN IMPERVIOUS SPECIALTY XL/XLONG 39049

## (undated) DEVICE — SYR 05ML SLIP TIP W/O NDL 309647

## (undated) DEVICE — SYR 03ML LL W/O NDL 309657

## (undated) RX ORDER — PROTAMINE SULFATE 10 MG/ML
INJECTION, SOLUTION INTRAVENOUS
Status: DISPENSED
Start: 2023-07-13

## (undated) RX ORDER — CEFAZOLIN SODIUM 1 G/3ML
INJECTION, POWDER, FOR SOLUTION INTRAMUSCULAR; INTRAVENOUS
Status: DISPENSED
Start: 2023-07-13

## (undated) RX ORDER — ASPIRIN 81 MG/1
TABLET, CHEWABLE ORAL
Status: DISPENSED
Start: 2023-07-13

## (undated) RX ORDER — LIDOCAINE HYDROCHLORIDE 10 MG/ML
INJECTION, SOLUTION EPIDURAL; INFILTRATION; INTRACAUDAL; PERINEURAL
Status: DISPENSED
Start: 2017-11-09

## (undated) RX ORDER — FENTANYL CITRATE 50 UG/ML
INJECTION, SOLUTION INTRAMUSCULAR; INTRAVENOUS
Status: DISPENSED
Start: 2023-07-13

## (undated) RX ORDER — PROPOFOL 10 MG/ML
VIAL (ML) INTRAVENOUS
Status: DISPENSED
Start: 2017-11-09

## (undated) RX ORDER — REGADENOSON 0.08 MG/ML
INJECTION, SOLUTION INTRAVENOUS
Status: DISPENSED
Start: 2022-06-06

## (undated) RX ORDER — FENTANYL CITRATE 0.05 MG/ML
INJECTION, SOLUTION INTRAMUSCULAR; INTRAVENOUS
Status: DISPENSED
Start: 2023-07-13

## (undated) RX ORDER — EPHEDRINE SULFATE 50 MG/ML
INJECTION, SOLUTION INTRAMUSCULAR; INTRAVENOUS; SUBCUTANEOUS
Status: DISPENSED
Start: 2023-07-13

## (undated) RX ORDER — ONDANSETRON 2 MG/ML
INJECTION INTRAMUSCULAR; INTRAVENOUS
Status: DISPENSED
Start: 2023-07-13

## (undated) RX ORDER — GLYCOPYRROLATE 0.2 MG/ML
INJECTION, SOLUTION INTRAMUSCULAR; INTRAVENOUS
Status: DISPENSED
Start: 2017-11-09

## (undated) RX ORDER — HEPARIN SODIUM 1000 [USP'U]/ML
INJECTION, SOLUTION INTRAVENOUS; SUBCUTANEOUS
Status: DISPENSED
Start: 2023-07-13

## (undated) RX ORDER — ACETAMINOPHEN 325 MG/1
TABLET ORAL
Status: DISPENSED
Start: 2023-07-13

## (undated) RX ORDER — LIDOCAINE HYDROCHLORIDE 10 MG/ML
INJECTION, SOLUTION EPIDURAL; INFILTRATION; INTRACAUDAL; PERINEURAL
Status: DISPENSED
Start: 2025-07-17